# Patient Record
Sex: FEMALE | Race: WHITE | NOT HISPANIC OR LATINO | Employment: OTHER | URBAN - METROPOLITAN AREA
[De-identification: names, ages, dates, MRNs, and addresses within clinical notes are randomized per-mention and may not be internally consistent; named-entity substitution may affect disease eponyms.]

---

## 2017-01-20 ENCOUNTER — RESULTS ENCOUNTER (OUTPATIENT)
Dept: INTERNAL MEDICINE | Facility: CLINIC | Age: 57
End: 2017-01-20

## 2017-01-20 DIAGNOSIS — E78.5 HYPERLIPIDEMIA: ICD-10-CM

## 2017-01-20 DIAGNOSIS — E03.9 ACQUIRED HYPOTHYROIDISM: ICD-10-CM

## 2017-01-25 ENCOUNTER — RESULTS ENCOUNTER (OUTPATIENT)
Dept: INTERNAL MEDICINE | Facility: CLINIC | Age: 57
End: 2017-01-25

## 2017-01-25 DIAGNOSIS — E55.9 VITAMIN D DEFICIENCY: ICD-10-CM

## 2017-02-02 RX ORDER — VENLAFAXINE HYDROCHLORIDE 75 MG/1
CAPSULE, EXTENDED RELEASE ORAL
Qty: 90 CAPSULE | Refills: 1 | Status: SHIPPED | OUTPATIENT
Start: 2017-02-02 | End: 2017-04-24 | Stop reason: SDUPTHER

## 2017-02-28 ENCOUNTER — HOSPITAL ENCOUNTER (EMERGENCY)
Facility: HOSPITAL | Age: 57
Discharge: HOME | End: 2017-02-28
Attending: EMERGENCY MEDICINE | Admitting: EMERGENCY MEDICINE

## 2017-02-28 ENCOUNTER — APPOINTMENT (OUTPATIENT)
Dept: GENERAL RADIOLOGY | Facility: HOSPITAL | Age: 57
End: 2017-02-28

## 2017-02-28 VITALS
HEIGHT: 63 IN | TEMPERATURE: 98.6 F | RESPIRATION RATE: 16 BRPM | WEIGHT: 176 LBS | OXYGEN SATURATION: 99 % | HEART RATE: 79 BPM | BODY MASS INDEX: 31.18 KG/M2 | SYSTOLIC BLOOD PRESSURE: 140 MMHG | DIASTOLIC BLOOD PRESSURE: 95 MMHG

## 2017-02-28 DIAGNOSIS — S93.432A SPRAIN OF TIBIOFIBULAR LIGAMENT OF LEFT ANKLE, INITIAL ENCOUNTER: Primary | ICD-10-CM

## 2017-02-28 PROCEDURE — 73630 X-RAY EXAM OF FOOT: CPT

## 2017-02-28 PROCEDURE — 99282 EMERGENCY DEPT VISIT SF MDM: CPT | Performed by: EMERGENCY MEDICINE

## 2017-02-28 PROCEDURE — 99283 EMERGENCY DEPT VISIT LOW MDM: CPT

## 2017-04-14 ENCOUNTER — TELEPHONE (OUTPATIENT)
Dept: INTERNAL MEDICINE | Facility: CLINIC | Age: 57
End: 2017-04-14

## 2017-04-14 NOTE — TELEPHONE ENCOUNTER
----- Message from ESTHER Edmonds sent at 4/10/2017  8:10 AM EDT -----  Regarding: RE: CHANGE OF MED  Contact: [phone]  This must be related to her deductible. There is no alternative in this drug family. However, there is another medication she can try    Welchol 625mg  Sig: 3 PO BID  Disp #180  ----- Message -----     From: Patricia Carter MA     Sent: 4/10/2017   7:47 AM       To: ESTHER Edmonds  Subject: FW: CHANGE OF MED                                    ----- Message -----     From: Patricia Carter MA     Sent: 2017   1:38 PM       To: Patricia Carter MA  Subject: FW: CHANGE OF MED                                Tried calling pt. To let know that Kiesha is out of town until monday  ----- Message -----     From: Margie Braun     Sent: 2017  12:56 PM       To: Patricia Pinto[address] [place] [address]  Subject: CHANGE OF MED                                    : [birth year]    KIESHA PATIENT    PATIENT CURRENTLY IS TAKING ZETIA, 10 MG, ONCE DAILY.  THE COST OF THE MED IS $700 AND WONDERS IF THERE IS SOMETHING COMPARABLE AND CHEAPER.

## 2017-04-18 ENCOUNTER — TELEPHONE (OUTPATIENT)
Dept: INTERNAL MEDICINE | Facility: CLINIC | Age: 57
End: 2017-04-18

## 2017-04-18 NOTE — TELEPHONE ENCOUNTER
----- Message from ESTHER Edmonds sent at 4/10/2017  8:10 AM EDT -----  Regarding: RE: CHANGE OF MED  Contact: [phone]  This must be related to her deductible. There is no alternative in this drug family. However, there is another medication she can try    Welchol 625mg  Sig: 3 PO BID  Disp #180  ----- Message -----     From: Patricia Carter MA     Sent: 4/10/2017   7:47 AM       To: ESTHER Edmonds  Subject: FW: CHANGE OF MED                                    ----- Message -----     From: Patricia Carter MA     Sent: 2017   1:38 PM       To: Patricia Carter MA  Subject: FW: CHANGE OF MED                                Tried calling pt. To let know that Kiesha is out of town until monday  ----- Message -----     From: Margie Braun     Sent: 2017  12:56 PM       To: Patricia Pinto[address] [place] [address]  Subject: CHANGE OF MED                                    : [birth year]    KIESHA PATIENT    PATIENT CURRENTLY IS TAKING ZETIA, 10 MG, ONCE DAILY.  THE COST OF THE MED IS $700 AND WONDERS IF THERE IS SOMETHING COMPARABLE AND CHEAPER.

## 2017-04-24 RX ORDER — VENLAFAXINE HYDROCHLORIDE 75 MG/1
75 CAPSULE, EXTENDED RELEASE ORAL DAILY
Qty: 90 CAPSULE | Refills: 1 | Status: SHIPPED | OUTPATIENT
Start: 2017-04-24 | End: 2018-03-11 | Stop reason: SDUPTHER

## 2017-04-24 RX ORDER — LEVOTHYROXINE SODIUM 88 UG/1
88 TABLET ORAL DAILY
Qty: 90 TABLET | Refills: 1 | Status: SHIPPED | OUTPATIENT
Start: 2017-04-24 | End: 2017-11-20 | Stop reason: DRUGHIGH

## 2017-04-24 RX ORDER — CELECOXIB 200 MG/1
200 CAPSULE ORAL DAILY
Qty: 90 CAPSULE | Refills: 3 | Status: SHIPPED | OUTPATIENT
Start: 2017-04-24 | End: 2018-04-25 | Stop reason: SDUPTHER

## 2017-05-02 ENCOUNTER — TELEPHONE (OUTPATIENT)
Dept: INTERNAL MEDICINE | Facility: CLINIC | Age: 57
End: 2017-05-02

## 2017-05-04 ENCOUNTER — TELEPHONE (OUTPATIENT)
Dept: INTERNAL MEDICINE | Facility: CLINIC | Age: 57
End: 2017-05-04

## 2017-05-08 LAB
ALBUMIN SERPL-MCNC: 4.3 G/DL (ref 3.5–5.2)
ALBUMIN/GLOB SERPL: 1.4 G/DL
ALP SERPL-CCNC: 133 U/L (ref 40–129)
ALT SERPL-CCNC: 22 U/L (ref 5–33)
AST SERPL-CCNC: 29 U/L (ref 5–32)
BASOPHILS # BLD AUTO: 0.03 10*3/MM3 (ref 0–0.2)
BASOPHILS NFR BLD AUTO: 0.7 % (ref 0–2)
BILIRUB SERPL-MCNC: 0.4 MG/DL (ref 0.2–1.2)
BUN SERPL-MCNC: 16 MG/DL (ref 6–20)
BUN/CREAT SERPL: 25 (ref 7–25)
CALCIUM SERPL-MCNC: 9 MG/DL (ref 8.6–10.5)
CHLORIDE SERPL-SCNC: 101 MMOL/L (ref 98–107)
CHOLEST SERPL-MCNC: 248 MG/DL (ref 0–200)
CHOLEST/HDLC SERPL: 3.94 {RATIO}
CO2 SERPL-SCNC: 28.6 MMOL/L (ref 22–29)
CREAT SERPL-MCNC: 0.64 MG/DL (ref 0.57–1)
EOSINOPHIL # BLD AUTO: 0.08 10*3/MM3 (ref 0.1–0.3)
EOSINOPHIL NFR BLD AUTO: 1.8 % (ref 0–4)
ERYTHROCYTE [DISTWIDTH] IN BLOOD BY AUTOMATED COUNT: 13.6 % (ref 11.5–14.5)
GLOBULIN SER CALC-MCNC: 3 GM/DL
GLUCOSE SERPL-MCNC: 88 MG/DL (ref 65–99)
HCT VFR BLD AUTO: 44 % (ref 37–47)
HDLC SERPL-MCNC: 63 MG/DL (ref 40–60)
HGB BLD-MCNC: 14.1 G/DL (ref 12–16)
IMM GRANULOCYTES # BLD: 0.01 10*3/MM3 (ref 0–0.03)
IMM GRANULOCYTES NFR BLD: 0.2 % (ref 0–0.5)
LDLC SERPL CALC-MCNC: 168 MG/DL (ref 0–100)
LYMPHOCYTES # BLD AUTO: 1.83 10*3/MM3 (ref 0.6–4.8)
LYMPHOCYTES NFR BLD AUTO: 40.7 % (ref 20–45)
MCH RBC QN AUTO: 26.7 PG (ref 27–31)
MCHC RBC AUTO-ENTMCNC: 32 G/DL (ref 31–37)
MCV RBC AUTO: 83.2 FL (ref 81–99)
MONOCYTES # BLD AUTO: 0.45 10*3/MM3 (ref 0–1)
MONOCYTES NFR BLD AUTO: 10 % (ref 3–8)
NEUTROPHILS # BLD AUTO: 2.1 10*3/MM3 (ref 1.5–8.3)
NEUTROPHILS NFR BLD AUTO: 46.6 % (ref 45–70)
NRBC BLD AUTO-RTO: 0 /100 WBC (ref 0–0)
PLATELET # BLD AUTO: 280 10*3/MM3 (ref 140–500)
POTASSIUM SERPL-SCNC: 4.1 MMOL/L (ref 3.5–5.2)
PROT SERPL-MCNC: 7.3 G/DL (ref 6–8.5)
RBC # BLD AUTO: 5.29 10*6/MM3 (ref 4.2–5.4)
SODIUM SERPL-SCNC: 143 MMOL/L (ref 136–145)
T4 SERPL-MCNC: 5.43 MCG/DL (ref 4.5–11.7)
TRIGL SERPL-MCNC: 87 MG/DL (ref 0–150)
TSH SERPL DL<=0.005 MIU/L-ACNC: 0.76 MIU/ML (ref 0.27–4.2)
VLDLC SERPL CALC-MCNC: 17.4 MG/DL (ref 7–27)
WBC # BLD AUTO: 4.5 10*3/MM3 (ref 4.8–10.8)

## 2017-05-15 ENCOUNTER — OFFICE VISIT (OUTPATIENT)
Dept: INTERNAL MEDICINE | Facility: CLINIC | Age: 57
End: 2017-05-15

## 2017-05-15 VITALS
OXYGEN SATURATION: 99 % | SYSTOLIC BLOOD PRESSURE: 132 MMHG | HEART RATE: 76 BPM | DIASTOLIC BLOOD PRESSURE: 86 MMHG | HEIGHT: 63 IN | BODY MASS INDEX: 31.54 KG/M2 | WEIGHT: 178 LBS

## 2017-05-15 DIAGNOSIS — E03.9 ADULT HYPOTHYROIDISM: ICD-10-CM

## 2017-05-15 DIAGNOSIS — L03.211 CELLULITIS, FACE: ICD-10-CM

## 2017-05-15 DIAGNOSIS — E78.5 HYPERLIPIDEMIA, UNSPECIFIED HYPERLIPIDEMIA TYPE: ICD-10-CM

## 2017-05-15 DIAGNOSIS — M25.50 ARTHRALGIA OF MULTIPLE JOINTS: ICD-10-CM

## 2017-05-15 DIAGNOSIS — Z00.00 HEALTHCARE MAINTENANCE: Primary | ICD-10-CM

## 2017-05-15 PROCEDURE — 99396 PREV VISIT EST AGE 40-64: CPT | Performed by: NURSE PRACTITIONER

## 2017-05-15 RX ORDER — SULFAMETHOXAZOLE AND TRIMETHOPRIM 800; 160 MG/1; MG/1
1 TABLET ORAL 2 TIMES DAILY
Qty: 14 TABLET | Refills: 0 | Status: SHIPPED | OUTPATIENT
Start: 2017-05-15 | End: 2017-11-15

## 2017-05-15 RX ORDER — TRAMADOL HYDROCHLORIDE 100 MG/1
100 TABLET, EXTENDED RELEASE ORAL NIGHTLY
Qty: 90 TABLET | Refills: 0 | Status: SHIPPED | OUTPATIENT
Start: 2017-05-15 | End: 2017-05-15 | Stop reason: SDUPTHER

## 2017-05-15 RX ORDER — MULTIVIT-MIN/IRON/FOLIC ACID/K 18-600-40
2000 CAPSULE ORAL DAILY
Qty: 30 CAPSULE | Refills: 11 | Status: SHIPPED | OUTPATIENT
Start: 2017-05-15

## 2017-05-15 RX ORDER — TRAMADOL HYDROCHLORIDE 100 MG/1
100 TABLET, EXTENDED RELEASE ORAL NIGHTLY
Qty: 90 TABLET | Refills: 0 | Status: SHIPPED | OUTPATIENT
Start: 2017-05-15 | End: 2017-10-03 | Stop reason: SDUPTHER

## 2017-05-20 ENCOUNTER — RESULTS ENCOUNTER (OUTPATIENT)
Dept: INTERNAL MEDICINE | Facility: CLINIC | Age: 57
End: 2017-05-20

## 2017-05-20 DIAGNOSIS — E78.5 HYPERLIPIDEMIA, UNSPECIFIED HYPERLIPIDEMIA TYPE: ICD-10-CM

## 2017-06-01 ENCOUNTER — TRANSCRIBE ORDERS (OUTPATIENT)
Dept: ADMINISTRATIVE | Facility: HOSPITAL | Age: 57
End: 2017-06-01

## 2017-06-01 DIAGNOSIS — Z13.9 SCREENING: Primary | ICD-10-CM

## 2017-10-03 DIAGNOSIS — M25.50 ARTHRALGIA OF MULTIPLE JOINTS: ICD-10-CM

## 2017-10-03 RX ORDER — TRAMADOL HYDROCHLORIDE 100 MG/1
100 TABLET, EXTENDED RELEASE ORAL NIGHTLY
Qty: 30 TABLET | Refills: 0 | OUTPATIENT
Start: 2017-10-03 | End: 2017-11-20 | Stop reason: SDUPTHER

## 2017-11-15 ENCOUNTER — OFFICE VISIT (OUTPATIENT)
Dept: INTERNAL MEDICINE | Facility: CLINIC | Age: 57
End: 2017-11-15

## 2017-11-15 VITALS
HEART RATE: 80 BPM | HEIGHT: 64 IN | TEMPERATURE: 98.1 F | WEIGHT: 188 LBS | RESPIRATION RATE: 16 BRPM | SYSTOLIC BLOOD PRESSURE: 130 MMHG | OXYGEN SATURATION: 96 % | DIASTOLIC BLOOD PRESSURE: 90 MMHG | BODY MASS INDEX: 32.1 KG/M2

## 2017-11-15 DIAGNOSIS — E03.9 ADULT HYPOTHYROIDISM: Primary | ICD-10-CM

## 2017-11-15 DIAGNOSIS — E78.5 HYPERLIPIDEMIA, UNSPECIFIED HYPERLIPIDEMIA TYPE: ICD-10-CM

## 2017-11-15 DIAGNOSIS — Z11.59 NEED FOR HEPATITIS C SCREENING TEST: ICD-10-CM

## 2017-11-15 DIAGNOSIS — E55.9 VITAMIN D DEFICIENCY: ICD-10-CM

## 2017-11-15 PROCEDURE — 99214 OFFICE O/P EST MOD 30 MIN: CPT | Performed by: NURSE PRACTITIONER

## 2017-11-15 RX ORDER — COLESEVELAM 180 1/1
1875 TABLET ORAL 2 TIMES DAILY WITH MEALS
Qty: 180 TABLET | Refills: 3 | Status: SHIPPED | OUTPATIENT
Start: 2017-11-15 | End: 2017-11-27 | Stop reason: SDUPTHER

## 2017-11-15 NOTE — PROGRESS NOTES
Chief Complaint   Patient presents with   • Hypothyroidism     fasting for all labs   • Depression       Subjective     Cecily Broussard is a 57 y.o. female being seen for a follow up appointment today regarding hyperlipidemia, Vitamin D Def, Depression and hypothyroidism. She did not have her fasting labs complete. She is not taking Zetia due to cost, and she cannot take statins due to joint pains. Denies  s/s of hypoglycemia.     She is taking Ultram as needed for joint pains as needed. She had a positive CCP test. She takes only once weekly. She takes Celebrex daily. Daily joint pains rated 5 of 10. She is exercising regularly.       History of Present Illness     Allergies   Allergen Reactions   • Estrogens Other (See Comments)   • Statins Other (See Comments)     Joint pains         Current Outpatient Prescriptions:   •  celecoxib (CeleBREX) 200 MG capsule, Take 1 capsule by mouth Daily., Disp: 90 capsule, Rfl: 3  •  Cholecalciferol (VITAMIN D) 2000 UNITS capsule, Take 1 capsule by mouth Daily., Disp: 30 capsule, Rfl: 11  •  Cholecalciferol (VITAMIN D3) 1000 UNIT/SPRAY liquid, Take  by mouth., Disp: , Rfl:   •  levothyroxine (SYNTHROID, LEVOTHROID) 88 MCG tablet, Take 1 tablet by mouth Daily., Disp: 90 tablet, Rfl: 1  •  traMADol ER (ULTRAM-ER) 100 MG 24 hr tablet, Take 1 tablet by mouth Every Night., Disp: 30 tablet, Rfl: 0  •  venlafaxine XR (EFFEXOR-XR) 75 MG 24 hr capsule, Take 1 capsule by mouth Daily., Disp: 90 capsule, Rfl: 1  •  cycloSPORINE (RESTASIS) 0.05 % ophthalmic emulsion, Apply  to eye., Disp: , Rfl:   •  sulfamethoxazole-trimethoprim (BACTRIM DS) 800-160 MG per tablet, Take 1 tablet by mouth 2 (Two) Times a Day., Disp: 14 tablet, Rfl: 0    The following portions of the patient's history were reviewed and updated as appropriate: allergies, current medications, past family history, past medical history, past social history, past surgical history and problem list.    Review of Systems    Constitutional: Negative.    HENT: Negative.    Eyes: Negative.    Respiratory: Negative.    Cardiovascular: Negative.    Gastrointestinal: Negative.    Endocrine: Negative.    Genitourinary: Negative.    Musculoskeletal: Positive for arthralgias. Negative for joint swelling and myalgias.   Skin: Negative.    Allergic/Immunologic: Negative.    Neurological: Negative.  Negative for dizziness and facial asymmetry.   Hematological: Negative.  Negative for adenopathy. Does not bruise/bleed easily.   Psychiatric/Behavioral: Negative.  Negative for agitation, behavioral problems and decreased concentration.       Assessment     Physical Exam   Constitutional: She is oriented to person, place, and time. She appears well-developed and well-nourished.   HENT:   Head: Normocephalic.   Right Ear: External ear normal.   Left Ear: External ear normal.   Nose: Nose normal.   Mouth/Throat: Oropharynx is clear and moist.   Neck: Neck supple. No thyromegaly present.   Cardiovascular: Normal rate, regular rhythm and normal heart sounds.    No murmur heard.  Pulmonary/Chest: Effort normal and breath sounds normal. No respiratory distress. She has no wheezes.   Musculoskeletal: She exhibits no edema.   Bilateral hands with with swelling of DIP and PIP joints. No swan neck deformity.    Lymphadenopathy:     She has no cervical adenopathy.   Neurological: She is alert and oriented to person, place, and time. No cranial nerve deficit.   Skin: Skin is warm and dry.   Psychiatric: She has a normal mood and affect. Her behavior is normal.   Vitals reviewed.      Plan     Her fasting labs were drawn today.     Cecily was seen today for hypothyroidism and depression.    Diagnoses and all orders for this visit:    Adult hypothyroidism  -     T4 & TSH ([facility])  -     CBC & Differential    Hyperlipidemia, unspecified hyperlipidemia type  -     Comprehensive metabolic panel  -     Conv Lipid Panel w/ Chol/HDL Ratio  -     colesevelam (WELCHOL)  625 MG tablet; Take 3 tablets by mouth 2 (Two) Times a Day With Meals.    Vitamin D deficiency  -     Vitamin D 1,25 Dihydroxy    Need for hepatitis C screening test  -     Hepatitis C Antibody        The patient has read and signed the [facility] Controlled Substance Contract.  I will continue to see patient for regular follow up appointments.  They are well controlled on their medication.  MANDY is updated every 3 months. The patient is aware of the potential for addiction and dependence.

## 2017-11-17 LAB
1,25(OH)2D3 SERPL-MCNC: 55.4 PG/ML (ref 19.9–79.3)
ALBUMIN SERPL-MCNC: 4.5 G/DL (ref 3.5–5.2)
ALBUMIN/GLOB SERPL: 1.7 G/DL
ALP SERPL-CCNC: 119 U/L (ref 40–129)
ALT SERPL-CCNC: 15 U/L (ref 5–33)
AST SERPL-CCNC: 20 U/L (ref 5–32)
BASOPHILS # BLD AUTO: 0.02 10*3/MM3 (ref 0–0.2)
BASOPHILS NFR BLD AUTO: 0.5 % (ref 0–2)
BILIRUB SERPL-MCNC: 0.4 MG/DL (ref 0.2–1.2)
BUN SERPL-MCNC: 16 MG/DL (ref 6–20)
BUN/CREAT SERPL: 20.3 (ref 7–25)
CALCIUM SERPL-MCNC: 9.1 MG/DL (ref 8.6–10.5)
CHLORIDE SERPL-SCNC: 102 MMOL/L (ref 98–107)
CHOLEST SERPL-MCNC: 240 MG/DL (ref 0–200)
CHOLEST/HDLC SERPL: 3.81 {RATIO}
CO2 SERPL-SCNC: 31.7 MMOL/L (ref 22–29)
CREAT SERPL-MCNC: 0.79 MG/DL (ref 0.57–1)
EOSINOPHIL # BLD AUTO: 0.05 10*3/MM3 (ref 0.1–0.3)
EOSINOPHIL NFR BLD AUTO: 1.2 % (ref 0–4)
ERYTHROCYTE [DISTWIDTH] IN BLOOD BY AUTOMATED COUNT: 13.3 % (ref 11.5–14.5)
GFR SERPLBLD CREATININE-BSD FMLA CKD-EPI: 75 ML/MIN/1.73
GFR SERPLBLD CREATININE-BSD FMLA CKD-EPI: 91 ML/MIN/1.73
GLOBULIN SER CALC-MCNC: 2.6 GM/DL
GLUCOSE SERPL-MCNC: 87 MG/DL (ref 65–99)
HCT VFR BLD AUTO: 43.7 % (ref 37–47)
HCV AB S/CO SERPL IA: <0.1 S/CO RATIO (ref 0–0.9)
HDLC SERPL-MCNC: 63 MG/DL (ref 40–60)
HGB BLD-MCNC: 14.2 G/DL (ref 12–16)
IMM GRANULOCYTES # BLD: 0.01 10*3/MM3 (ref 0–0.03)
IMM GRANULOCYTES NFR BLD: 0.2 % (ref 0–0.5)
LDLC SERPL CALC-MCNC: 156 MG/DL (ref 0–100)
LYMPHOCYTES # BLD AUTO: 1.33 10*3/MM3 (ref 0.6–4.8)
LYMPHOCYTES NFR BLD AUTO: 31.3 % (ref 20–45)
MCH RBC QN AUTO: 27.8 PG (ref 27–31)
MCHC RBC AUTO-ENTMCNC: 32.5 G/DL (ref 31–37)
MCV RBC AUTO: 85.7 FL (ref 81–99)
MONOCYTES # BLD AUTO: 0.55 10*3/MM3 (ref 0–1)
MONOCYTES NFR BLD AUTO: 12.9 % (ref 3–8)
NEUTROPHILS # BLD AUTO: 2.29 10*3/MM3 (ref 1.5–8.3)
NEUTROPHILS NFR BLD AUTO: 53.9 % (ref 45–70)
NRBC BLD AUTO-RTO: 0 /100 WBC (ref 0–0)
PLATELET # BLD AUTO: 255 10*3/MM3 (ref 140–500)
POTASSIUM SERPL-SCNC: 4.4 MMOL/L (ref 3.5–5.2)
PROT SERPL-MCNC: 7.1 G/DL (ref 6–8.5)
RBC # BLD AUTO: 5.1 10*6/MM3 (ref 4.2–5.4)
SODIUM SERPL-SCNC: 141 MMOL/L (ref 136–145)
T4 SERPL-MCNC: 5.18 MCG/DL (ref 4.5–11.7)
TRIGL SERPL-MCNC: 105 MG/DL (ref 0–150)
TSH SERPL DL<=0.005 MIU/L-ACNC: 7.51 MIU/ML (ref 0.27–4.2)
VLDLC SERPL CALC-MCNC: 21 MG/DL (ref 7–27)
WBC # BLD AUTO: 4.25 10*3/MM3 (ref 4.8–10.8)

## 2017-11-20 ENCOUNTER — TELEPHONE (OUTPATIENT)
Dept: INTERNAL MEDICINE | Facility: CLINIC | Age: 57
End: 2017-11-20

## 2017-11-20 DIAGNOSIS — M25.50 ARTHRALGIA OF MULTIPLE JOINTS: ICD-10-CM

## 2017-11-20 RX ORDER — TRAMADOL HYDROCHLORIDE 100 MG/1
100 TABLET, EXTENDED RELEASE ORAL NIGHTLY
Qty: 30 TABLET | Refills: 2 | OUTPATIENT
Start: 2017-11-20 | End: 2018-06-20 | Stop reason: SDUPTHER

## 2017-11-20 RX ORDER — LEVOTHYROXINE SODIUM 0.1 MG/1
100 TABLET ORAL DAILY
Qty: 30 TABLET | Refills: 1 | Status: SHIPPED | OUTPATIENT
Start: 2017-11-20 | End: 2018-02-01 | Stop reason: SDUPTHER

## 2017-11-20 NOTE — TELEPHONE ENCOUNTER
Patient has been advised and voiced understanding. New RX Synthroid 100 mcg sent to local pharmacy. Patient advised to increase RX Welchol to 2 tablets twice daily.     ----- Message from ESTHER Edmonds sent at 11/20/2017 11:52 AM EST -----  I want her to increase her Synthroid to 100 mcgs daily as her thyroid level is a bit low. Repeat TSH and free t4 in 4 weeks. Her cholesterol has improved slightly from last check. Try to increase Welchol to 2 tablets twice daily. Blood glucose is normal.

## 2017-11-27 ENCOUNTER — TELEPHONE (OUTPATIENT)
Dept: INTERNAL MEDICINE | Facility: CLINIC | Age: 57
End: 2017-11-27

## 2017-11-27 RX ORDER — CHOLESTYRAMINE 4 G/9G
POWDER, FOR SUSPENSION ORAL
Qty: 90 PACKET | Refills: 3 | Status: SHIPPED | OUTPATIENT
Start: 2017-11-27 | End: 2019-01-07

## 2017-11-27 NOTE — TELEPHONE ENCOUNTER
----- Message from ESTHER Edmonds sent at 2017 12:47 PM EST -----  Regarding: FW: REPLACEMENT MED  cholestyramine 4 g pkt  Si Pkt daily  Disp #90, 3 refills  ----- Message -----     From: Kae Parker MA     Sent: 2017  10:54 AM       To: ESTHER Edmonds  Subject: FW: REPLACEMENT MED                                  ----- Message -----     From: Radha Sen     Sent: 2017  10:48 AM       To: Kae Parker MA  Subject: REPLACEMENT MED                                  kendra    Pharmacy - [facility] in [place]    colesevelam (WELCHOL) 625 MG tablet #patient can't afford   it is $695 for 30 day supply    Patient says she would be happy to look up her cost of medication (before sending in script) if we let her know what Lyubov may prescribe.    DRUG ALLERGIES - NONE    [phone]    Left message for pt.  Sent to pharmacy

## 2018-02-01 RX ORDER — LEVOTHYROXINE SODIUM 0.1 MG/1
TABLET ORAL
Qty: 30 TABLET | Refills: 3 | Status: SHIPPED | OUTPATIENT
Start: 2018-02-01 | End: 2018-08-14 | Stop reason: SDUPTHER

## 2018-03-12 RX ORDER — VENLAFAXINE HYDROCHLORIDE 75 MG/1
CAPSULE, EXTENDED RELEASE ORAL
Qty: 30 CAPSULE | Refills: 5 | Status: SHIPPED | OUTPATIENT
Start: 2018-03-12 | End: 2018-11-01 | Stop reason: SDUPTHER

## 2018-04-25 RX ORDER — CELECOXIB 200 MG/1
CAPSULE ORAL
Qty: 30 CAPSULE | Refills: 2 | Status: SHIPPED | OUTPATIENT
Start: 2018-04-25 | End: 2018-08-14 | Stop reason: SDUPTHER

## 2018-05-09 ENCOUNTER — LAB (OUTPATIENT)
Dept: INTERNAL MEDICINE | Facility: CLINIC | Age: 58
End: 2018-05-09

## 2018-05-09 DIAGNOSIS — E03.9 ADULT HYPOTHYROIDISM: ICD-10-CM

## 2018-05-09 DIAGNOSIS — E55.9 VITAMIN D DEFICIENCY: ICD-10-CM

## 2018-05-09 DIAGNOSIS — Z00.00 HEALTHCARE MAINTENANCE: ICD-10-CM

## 2018-05-09 DIAGNOSIS — E78.5 HYPERLIPIDEMIA, UNSPECIFIED HYPERLIPIDEMIA TYPE: ICD-10-CM

## 2018-05-09 DIAGNOSIS — E78.5 HYPERLIPIDEMIA, UNSPECIFIED HYPERLIPIDEMIA TYPE: Primary | ICD-10-CM

## 2018-05-09 LAB
25(OH)D3+25(OH)D2 SERPL-MCNC: 42.1 NG/ML
ALBUMIN SERPL-MCNC: 4.1 G/DL (ref 3.5–5.2)
ALBUMIN/GLOB SERPL: 1.6 G/DL
ALP SERPL-CCNC: 118 U/L (ref 40–129)
ALT SERPL-CCNC: 18 U/L (ref 5–33)
AST SERPL-CCNC: 22 U/L (ref 5–32)
BASOPHILS # BLD AUTO: 0.03 10*3/MM3 (ref 0–0.2)
BASOPHILS NFR BLD AUTO: 0.7 % (ref 0–2)
BILIRUB SERPL-MCNC: 0.5 MG/DL (ref 0.2–1.2)
BUN SERPL-MCNC: 17 MG/DL (ref 6–20)
BUN/CREAT SERPL: 27 (ref 7–25)
CALCIUM SERPL-MCNC: 9.3 MG/DL (ref 8.6–10.5)
CHLORIDE SERPL-SCNC: 103 MMOL/L (ref 98–107)
CHOLEST SERPL-MCNC: 205 MG/DL (ref 0–200)
CHOLEST/HDLC SERPL: 3.31 {RATIO}
CO2 SERPL-SCNC: 28.6 MMOL/L (ref 22–29)
CREAT SERPL-MCNC: 0.63 MG/DL (ref 0.57–1)
EOSINOPHIL # BLD AUTO: 0.07 10*3/MM3 (ref 0.1–0.3)
EOSINOPHIL NFR BLD AUTO: 1.6 % (ref 0–4)
ERYTHROCYTE [DISTWIDTH] IN BLOOD BY AUTOMATED COUNT: 13.1 % (ref 11.5–14.5)
GFR SERPLBLD CREATININE-BSD FMLA CKD-EPI: 117 ML/MIN/1.73
GFR SERPLBLD CREATININE-BSD FMLA CKD-EPI: 97 ML/MIN/1.73
GLOBULIN SER CALC-MCNC: 2.5 GM/DL
GLUCOSE SERPL-MCNC: 88 MG/DL (ref 65–99)
HCT VFR BLD AUTO: 43.5 % (ref 37–47)
HDLC SERPL-MCNC: 62 MG/DL (ref 40–60)
HGB BLD-MCNC: 13.9 G/DL (ref 12–16)
IMM GRANULOCYTES # BLD: 0.01 10*3/MM3 (ref 0–0.03)
IMM GRANULOCYTES NFR BLD: 0.2 % (ref 0–0.5)
LDLC SERPL CALC-MCNC: 124 MG/DL (ref 0–100)
LYMPHOCYTES # BLD AUTO: 1.42 10*3/MM3 (ref 0.6–4.8)
LYMPHOCYTES NFR BLD AUTO: 33.3 % (ref 20–45)
MCH RBC QN AUTO: 27 PG (ref 27–31)
MCHC RBC AUTO-ENTMCNC: 32 G/DL (ref 31–37)
MCV RBC AUTO: 84.5 FL (ref 81–99)
MONOCYTES # BLD AUTO: 0.55 10*3/MM3 (ref 0–1)
MONOCYTES NFR BLD AUTO: 12.9 % (ref 3–8)
NEUTROPHILS # BLD AUTO: 2.19 10*3/MM3 (ref 1.5–8.3)
NEUTROPHILS NFR BLD AUTO: 51.3 % (ref 45–70)
NRBC BLD AUTO-RTO: 0 /100 WBC (ref 0–0)
PLATELET # BLD AUTO: 231 10*3/MM3 (ref 140–500)
POTASSIUM SERPL-SCNC: 4.1 MMOL/L (ref 3.5–5.2)
PROT SERPL-MCNC: 6.6 G/DL (ref 6–8.5)
RBC # BLD AUTO: 5.15 10*6/MM3 (ref 4.2–5.4)
SODIUM SERPL-SCNC: 141 MMOL/L (ref 136–145)
T4 FREE SERPL-MCNC: 1.19 NG/DL (ref 0.93–1.7)
TRIGL SERPL-MCNC: 97 MG/DL (ref 0–150)
TSH SERPL DL<=0.005 MIU/L-ACNC: 0.1 MIU/ML (ref 0.27–4.2)
VLDLC SERPL CALC-MCNC: 19.4 MG/DL (ref 7–27)
WBC # BLD AUTO: 4.27 10*3/MM3 (ref 4.8–10.8)

## 2018-05-25 ENCOUNTER — OFFICE VISIT (OUTPATIENT)
Dept: INTERNAL MEDICINE | Facility: CLINIC | Age: 58
End: 2018-05-25

## 2018-05-25 VITALS
RESPIRATION RATE: 16 BRPM | OXYGEN SATURATION: 97 % | HEIGHT: 64 IN | DIASTOLIC BLOOD PRESSURE: 88 MMHG | WEIGHT: 183 LBS | TEMPERATURE: 97.5 F | BODY MASS INDEX: 31.24 KG/M2 | HEART RATE: 94 BPM | SYSTOLIC BLOOD PRESSURE: 138 MMHG

## 2018-05-25 DIAGNOSIS — K63.5 POLYP OF COLON, UNSPECIFIED PART OF COLON, UNSPECIFIED TYPE: ICD-10-CM

## 2018-05-25 DIAGNOSIS — Z00.00 HEALTHCARE MAINTENANCE: Primary | ICD-10-CM

## 2018-05-25 DIAGNOSIS — E55.9 VITAMIN D DEFICIENCY: ICD-10-CM

## 2018-05-25 DIAGNOSIS — Z12.83 SCREENING FOR SKIN CANCER: ICD-10-CM

## 2018-05-25 DIAGNOSIS — E78.2 MIXED HYPERLIPIDEMIA: ICD-10-CM

## 2018-05-25 DIAGNOSIS — E03.9 ADULT HYPOTHYROIDISM: ICD-10-CM

## 2018-05-25 DIAGNOSIS — F32.0 MILD SINGLE CURRENT EPISODE OF MAJOR DEPRESSIVE DISORDER (HCC): ICD-10-CM

## 2018-05-25 DIAGNOSIS — R87.612 LOW GRADE SQUAMOUS INTRAEPITH LESION ON CYTOLOGIC SMEAR CERVIX (LGSIL): ICD-10-CM

## 2018-05-25 LAB
BILIRUB BLD-MCNC: NEGATIVE MG/DL
CLARITY, POC: CLEAR
COLOR UR: YELLOW
GLUCOSE UR STRIP-MCNC: NEGATIVE MG/DL
KETONES UR QL: NEGATIVE
LEUKOCYTE EST, POC: ABNORMAL
NITRITE UR-MCNC: NEGATIVE MG/ML
PH UR: 6 [PH] (ref 5–8)
POC CREATININE URINE: NORMAL
POC MICROALBUMIN URINE: NORMAL
PROT UR STRIP-MCNC: NEGATIVE MG/DL
RBC # UR STRIP: NEGATIVE /UL
SP GR UR: 1.02 (ref 1–1.03)
UROBILINOGEN UR QL: NORMAL

## 2018-05-25 PROCEDURE — 82044 UR ALBUMIN SEMIQUANTITATIVE: CPT | Performed by: NURSE PRACTITIONER

## 2018-05-25 PROCEDURE — 99213 OFFICE O/P EST LOW 20 MIN: CPT | Performed by: NURSE PRACTITIONER

## 2018-05-25 PROCEDURE — 81003 URINALYSIS AUTO W/O SCOPE: CPT | Performed by: NURSE PRACTITIONER

## 2018-05-25 PROCEDURE — 99396 PREV VISIT EST AGE 40-64: CPT | Performed by: NURSE PRACTITIONER

## 2018-05-25 PROCEDURE — 93000 ELECTROCARDIOGRAM COMPLETE: CPT | Performed by: NURSE PRACTITIONER

## 2018-05-25 NOTE — PROGRESS NOTES
Procedure     ECG 12 Lead  Date/Time: 5/25/2018 1:13 PM  Performed by: KIESHA HOLLEY  Authorized by: KIESHA HOLLEY   Comparison: compared with previous ECG from 1/1/2016  Similar to previous ECG  Rhythm: sinus rhythm  Rate: normal  BPM: 87  ST Segments: ST segments normal  T Waves: T waves normal  QRS axis: left  Other: no other findings  Clinical impression: non-specific ECG

## 2018-05-25 NOTE — PROGRESS NOTES
Annual Exam        Cecily Broussard is being seen for a Complete physical exam. Her last physical was unknown.     Social: She is  . She is working full as a .    Lifestyle: She does not use tobacco, has a 2 Pack year history. She drinks twice alcoholic drinks per month. She exercises 2 times a week, yard work.    Screening: Colonoscopy was completed 11-, due now due to high grade dysplasia. Last labs reviewed from 5-9-2018.      Reproductive Health: Her Last Pap smear was last year with Dr. Meade.  Last Mammogram was 3-, order placed but she did not complete.    Dental exam is  up to date. Eye exam was completed not up to date.     She is also following up on Hypothyroidism and hyperlipidemia. She is complaint with levoxyl and Questran. She is having some constipation from Questran.     History of Present Illness     The following portions of the patient's history were reviewed and updated as appropriate: allergies, current medications, past family history, past medical history, past social history, past surgical history and problem list.    Review of Systems   Constitutional: Negative.    HENT: Negative.    Eyes: Negative.    Respiratory: Negative.    Cardiovascular: Negative.    Gastrointestinal: Negative.    Endocrine: Negative.    Genitourinary: Negative.    Musculoskeletal: Positive for arthralgias.   Allergic/Immunologic: Positive for environmental allergies.   Neurological: Negative.    Hematological: Negative.    Psychiatric/Behavioral: Negative.        Objective       General Appearance:    Alert, cooperative, no distress, appears stated age   Head:    Normocephalic, without obvious abnormality, atraumatic   Eyes:    PERRL, conjunctiva/corneas clear, EOM's intact, fundi     benign, both eyes   Ears:    Normal TM's and external ear canals, both ears   Nose:   Nares normal, septum midline, mucosa normal, no drainage     or sinus tenderness   Throat:   Lips, mucosa, and tongue  normal; teeth and gums normal   Neck:   Supple, symmetrical, trachea midline, no adenopathy;     thyroid:  no enlargement/tenderness/nodules; no carotid    bruit or JVD   Back:     Symmetric, no curvature, ROM normal, no CVA tenderness   Lungs:     Clear to auscultation bilaterally, respirations unlabored   Chest Wall:    No tenderness or deformity    Heart:    Regular rate and rhythm, S1 and S2 normal, no murmur, rub    or gallop   Breast Exam:    deferred   Abdomen:     Soft, non-tender, bowel sounds active all four quadrants,     no masses, no organomegaly   Genitalia:   deferred   Rectal:   deferred   Extremities:   Extremities normal, atraumatic, no cyanosis or edema   Pulses:   2+ and symmetric all extremities   Skin:   Skin color, texture, turgor normal, no rashes or lesions   Lymph nodes:   Cervical, supraclavicular, and axillary nodes normal   Neurologic:   CNII-XII intact, normal strength, sensation and reflexes     throughout               Assessment/Plan   Cecily was seen today for annual exam.    Diagnoses and all orders for this visit:    Healthcare maintenance  -     ECG 12 Lead    Mixed hyperlipidemia  -     ECG 12 Lead  -     Comprehensive metabolic panel; Future  -     Conv Lipid Panel w/ Chol/HDL Ratio; Future    Adult hypothyroidism  -     T4 & TSH ([facility]); Future    Polyp of colon, unspecified part of colon, unspecified type  -     Ambulatory Referral to Gastroenterology    Low grade squamous intraepith lesion on cytologic smear cervix (lgsil)    Vitamin D deficiency    Mild single current episode of major depressive disorder    Screening for skin cancer  -     Ambulatory Referral to Dermatology    She will continue current medications, including Questran and Levoxyl.     The patient has read and signed the [facility] Controlled Substance Contract.  I will continue to see patient for regular follow up appointments.  They are well controlled on their medication.  MANDY is updated every 3  months. The patient is aware of the potential for addiction and dependence.    She will follow up at next scheduled appointment in 6 months.

## 2018-06-20 ENCOUNTER — TRANSCRIBE ORDERS (OUTPATIENT)
Dept: ADMINISTRATIVE | Facility: HOSPITAL | Age: 58
End: 2018-06-20

## 2018-06-20 DIAGNOSIS — M25.50 ARTHRALGIA OF MULTIPLE JOINTS: ICD-10-CM

## 2018-06-20 DIAGNOSIS — Z12.39 SCREENING BREAST EXAMINATION: Primary | ICD-10-CM

## 2018-06-21 RX ORDER — TRAMADOL HYDROCHLORIDE 100 MG/1
100 TABLET, EXTENDED RELEASE ORAL NIGHTLY
Qty: 30 TABLET | Refills: 2 | Status: SHIPPED | OUTPATIENT
Start: 2018-06-21 | End: 2018-11-21 | Stop reason: SDUPTHER

## 2018-06-28 ENCOUNTER — APPOINTMENT (OUTPATIENT)
Dept: MAMMOGRAPHY | Facility: HOSPITAL | Age: 58
End: 2018-06-28

## 2018-07-06 ENCOUNTER — HOSPITAL ENCOUNTER (OUTPATIENT)
Dept: MAMMOGRAPHY | Facility: HOSPITAL | Age: 58
Discharge: HOME | End: 2018-07-06
Admitting: NURSE PRACTITIONER

## 2018-07-06 DIAGNOSIS — Z12.39 SCREENING BREAST EXAMINATION: ICD-10-CM

## 2018-07-06 PROCEDURE — 77067 SCR MAMMO BI INCL CAD: CPT

## 2018-07-17 ENCOUNTER — TELEPHONE (OUTPATIENT)
Dept: SURGERY | Facility: CLINIC | Age: 58
End: 2018-07-17

## 2018-07-17 ENCOUNTER — PREP FOR SURGERY (OUTPATIENT)
Dept: OTHER | Facility: HOSPITAL | Age: 58
End: 2018-07-17

## 2018-07-17 DIAGNOSIS — Z86.010 HISTORY OF COLON POLYPS: Primary | ICD-10-CM

## 2018-07-18 ENCOUNTER — TELEPHONE (OUTPATIENT)
Dept: GASTROENTEROLOGY | Facility: CLINIC | Age: 58
End: 2018-07-18

## 2018-07-18 ENCOUNTER — HOSPITAL ENCOUNTER (OUTPATIENT)
Facility: HOSPITAL | Age: 58
Setting detail: HOSPITAL OUTPATIENT SURGERY
End: 2018-07-18
Attending: INTERNAL MEDICINE | Admitting: INTERNAL MEDICINE

## 2018-07-18 PROBLEM — Z86.010 HISTORY OF COLON POLYPS: Status: ACTIVE | Noted: 2018-07-18

## 2018-07-18 PROBLEM — Z86.0100 HISTORY OF COLON POLYPS: Status: ACTIVE | Noted: 2018-07-18

## 2018-07-18 NOTE — TELEPHONE ENCOUNTER
SHE NOT HAVING ISSUES WITH SWALLOWING, WHICH WAS MARKED IN ERROR.  SHE DOES WANT THE OSMO PILLS.  PLEASE SENT TO [facility] IN [place].  THANKS!!

## 2018-07-18 NOTE — TELEPHONE ENCOUNTER
----- Message from Patrice Noel MD sent at 7/17/2018  9:34 PM EDT -----  Colon/BHLAG/Miralax/polyps-    She said trouble swallowing so I didn't send osmo...dont know if she needs an egd too??  ----- Message -----  From: Fransisca Jerrod  Sent: 7/17/2018   1:18 PM  To: Patrice Noel MD

## 2018-07-18 NOTE — TELEPHONE ENCOUNTER
SCHEDULED AT [facility] 09/25/2018 AT 8AM - ARRIVE 7AM.  SHE NOT HAVING TROUBLE SWALLOWING, DOES WANT THE OSMO PILLS.  WILL LET DR. CASPER KNOW.  EMAIL INSTRUCTIONS TO HER:  [email or web address].

## 2018-08-14 RX ORDER — CELECOXIB 200 MG/1
CAPSULE ORAL
Qty: 90 CAPSULE | Refills: 1 | Status: SHIPPED | OUTPATIENT
Start: 2018-08-14 | End: 2019-04-22 | Stop reason: SDUPTHER

## 2018-08-14 RX ORDER — LEVOTHYROXINE SODIUM 0.1 MG/1
TABLET ORAL
Qty: 30 TABLET | Refills: 2 | Status: SHIPPED | OUTPATIENT
Start: 2018-08-14 | End: 2018-12-31 | Stop reason: SDUPTHER

## 2018-09-18 ENCOUNTER — TELEPHONE (OUTPATIENT)
Dept: GASTROENTEROLOGY | Facility: CLINIC | Age: 58
End: 2018-09-18

## 2018-09-18 NOTE — TELEPHONE ENCOUNTER
SCHEDULED [facility] 09/25/2018, NEEDS TO RESCHEDULED, DAUGHTER IS HAVING BABY.  MOVED TO 11/06/2018 AT 8AM - ARRIVE 7AM.  SHE DOES WANT NEW INSTRUCTIONS, WILL EMAIL TO HER.  VERIFY EMAIL, WHICH IS CORRECT.  NOTIFY SARITA @ [facility].

## 2018-10-22 ENCOUNTER — OFFICE VISIT (OUTPATIENT)
Dept: OBSTETRICS AND GYNECOLOGY | Facility: CLINIC | Age: 58
End: 2018-10-22

## 2018-10-22 VITALS
HEIGHT: 64 IN | BODY MASS INDEX: 31.65 KG/M2 | DIASTOLIC BLOOD PRESSURE: 79 MMHG | SYSTOLIC BLOOD PRESSURE: 126 MMHG | WEIGHT: 185.4 LBS

## 2018-10-22 DIAGNOSIS — Z01.419 WELL WOMAN EXAM WITH ROUTINE GYNECOLOGICAL EXAM: Primary | ICD-10-CM

## 2018-10-22 DIAGNOSIS — Z11.51 SPECIAL SCREENING EXAMINATION FOR HUMAN PAPILLOMAVIRUS (HPV): ICD-10-CM

## 2018-10-22 DIAGNOSIS — Z13.9 SCREENING FOR CONDITION: ICD-10-CM

## 2018-10-22 DIAGNOSIS — Z11.3 SCREEN FOR STD (SEXUALLY TRANSMITTED DISEASE): ICD-10-CM

## 2018-10-22 PROBLEM — Z00.00 HEALTHCARE MAINTENANCE: Status: RESOLVED | Noted: 2017-05-15 | Resolved: 2018-10-22

## 2018-10-22 LAB
BILIRUB BLD-MCNC: NEGATIVE MG/DL
CLARITY, POC: CLEAR
COLOR UR: YELLOW
GLUCOSE UR STRIP-MCNC: NEGATIVE MG/DL
KETONES UR QL: NEGATIVE
LEUKOCYTE EST, POC: NEGATIVE
NITRITE UR-MCNC: NEGATIVE MG/ML
PH UR: 5 [PH] (ref 5–8)
PROT UR STRIP-MCNC: NEGATIVE MG/DL
RBC # UR STRIP: NEGATIVE /UL
SP GR UR: 1 (ref 1–1.03)
UROBILINOGEN UR QL: NORMAL

## 2018-10-22 PROCEDURE — 81002 URINALYSIS NONAUTO W/O SCOPE: CPT | Performed by: OBSTETRICS & GYNECOLOGY

## 2018-10-22 PROCEDURE — 99396 PREV VISIT EST AGE 40-64: CPT | Performed by: OBSTETRICS & GYNECOLOGY

## 2018-10-22 NOTE — PROGRESS NOTES
GYN Annual Exam     CC- Here for annual exam.     Cecily Broussard is a 58 y.o. female established patient of practice who is new to me who presents for annual well woman exam. She denies  VB. She is not on any HRT> She had LGSIL papin 2016 and had colpo bx with REAGAN 1. No other followup seen. She denies  VB. She is not on HRT.       OB History      Para Term  AB Living    2 2 2     2    SAB TAB Ectopic Molar Multiple Live Births                       Obstetric Comments    1   1 C/S          Menarche:13  Menopause:50  HRT:none  Current contraception: post menopausal status and tubal ligation  History of abnormal Pap smear: yes -  limited followup  History of abnormal mammogram: no  Family history of uterine, colon or ovarian cancer: no  Family history of breast cancer: yes - mom age 69, M aunt ? age  STD's: none  Last pap smear:2016- LGSIL      Health Maintenance   Topic Date Due   • ZOSTER VACCINE (1 of 2) 2009   • INFLUENZA VACCINE  2018   • PAP SMEAR  2019   • LIPID PANEL  2019   • ANNUAL PHYSICAL  2019   • MAMMOGRAM  2020   • COLONOSCOPY  2022   • TDAP/TD VACCINES (2 - Td) 2023   • HEPATITIS C SCREENING  Completed       Past Medical History:   Diagnosis Date   • Abnormal Pap smear of cervix    • Arthritis    • Cervical dysplasia 2016    REAGAN 1on bx   • Depression    • Disease of thyroid gland    • Hyperlipidemia    • Menopause        Past Surgical History:   Procedure Laterality Date   • ABDOMINOPLASTY     •  SECTION      x 1   • TUBAL ABDOMINAL LIGATION           Current Outpatient Prescriptions:   •  celecoxib (CeleBREX) 200 MG capsule, TAKE ONE CAPSULE BY MOUTH DAILY, Disp: 90 capsule, Rfl: 1  •  Cholecalciferol (VITAMIN D) 2000 UNITS capsule, Take 1 capsule by mouth Daily., Disp: 30 capsule, Rfl: 11  •  cholestyramine (QUESTRAN) 4 g packet, Take one packet daily, Disp: 90 packet, Rfl: 3  •  cycloSPORINE (RESTASIS) 0.05 % ophthalmic  "emulsion, Apply  to eye., Disp: , Rfl:   •  levothyroxine (SYNTHROID, LEVOTHROID) 100 MCG tablet, TAKE ONE TABLET BY MOUTH DAILY, Disp: 30 tablet, Rfl: 2  •  sodium phosphates (OSMOPREP) 1.102-0.398 g tablet tablet, Starting at 3:00pm -4 tabs every 15 min for 5 doses (20 tabs), second dosing starting at 11:00pm 4 tabs every 15 min. For 2 doses (8 tabs), Disp: 28 tablet, Rfl: 0  •  traMADol ER (ULTRAM-ER) 100 MG 24 hr tablet, Take 1 tablet by mouth Every Night., Disp: 30 tablet, Rfl: 2  •  venlafaxine XR (EFFEXOR-XR) 75 MG 24 hr capsule, TAKE ONE CAPSULE BY MOUTH DAILY, Disp: 30 capsule, Rfl: 5    Allergies   Allergen Reactions   • Estrogens Other (See Comments)   • Statins Other (See Comments)     Joint pains       Social History   Substance Use Topics   • Smoking status: Former Smoker     Years: 2.00     Quit date: 1985   • Smokeless tobacco: Not on file   • Alcohol use Yes      Comment: twice monthly       Family History   Problem Relation Age of Onset   • Breast cancer Mother 69   • Breast cancer Maternal Aunt    • Ovarian cancer Neg Hx    • Colon cancer Neg Hx    • Deep vein thrombosis Neg Hx    • Pulmonary embolism Neg Hx        Review of Systems   Constitutional: Negative for appetite change, fatigue, fever and unexpected weight change.   Respiratory: Negative for cough and shortness of breath.    Cardiovascular: Negative for chest pain and palpitations.   Gastrointestinal: Negative for abdominal distention, abdominal pain, constipation, diarrhea and nausea.   Endocrine: Negative for cold intolerance and heat intolerance.   Genitourinary: Negative for dyspareunia, dysuria, menstrual problem, pelvic pain and vaginal discharge.   Skin: Negative for color change and rash.   Neurological: Negative for headaches.   Psychiatric/Behavioral: Negative for dysphoric mood. The patient is not nervous/anxious.        /79   Ht 162.6 cm (64.02\")   Wt 84.1 kg (185 lb 6.4 oz)   Breastfeeding? No   BMI 31.81 kg/m² "     Physical Exam   Constitutional: She is oriented to person, place, and time. She appears well-developed and well-nourished.   HENT:   Head: Normocephalic and atraumatic.   Eyes: Conjunctivae are normal. No scleral icterus.   Neck: Neck supple. No thyromegaly present.   Cardiovascular: Normal rate and regular rhythm.    Pulmonary/Chest: Effort normal and breath sounds normal. Right breast exhibits no inverted nipple, no mass, no nipple discharge, no skin change and no tenderness. Left breast exhibits no inverted nipple, no mass, no nipple discharge, no skin change and no tenderness.   Abdominal: Soft. Bowel sounds are normal. She exhibits no distension and no mass. There is no tenderness. There is no rebound and no guarding. No hernia.   S/P abdominoplasty   Genitourinary: Uterus normal. Pelvic exam was performed with patient supine. There is no rash, tenderness or lesion on the right labia. There is no rash, tenderness or lesion on the left labia. Cervix exhibits no motion tenderness, no discharge and no friability. Right adnexum displays no mass, no tenderness and no fullness. Left adnexum displays no mass, no tenderness and no fullness. No erythema, tenderness or bleeding in the vagina. No foreign body in the vagina. No signs of injury around the vagina. No vaginal discharge found.   Neurological: She is alert and oriented to person, place, and time.   Skin: Skin is warm and dry.   Psychiatric: She has a normal mood and affect. Her behavior is normal. Judgment and thought content normal.   Nursing note and vitals reviewed.         Assessment/Plan    1) GYN HM: check pap/HPV   SBE demonstrated and encouraged.  2) STD screening: accepts, does not feel she is at risk but wants it done.Condoms encouraged.  3) Bone health - Weight bearing exercise, dietary calcium recommendations and vitamin D reviewed.   4) Diet and Exercise discussed  5) Smoking Status: No   6) Social: no issues  7)MMG:  UTD7/2018, Birads 1  8)  DEXA- needs one, schedule  9)C scope-scheduled for 11/2018   Follow up prn and 1 year       Cecily was seen today for gynecologic exam.    Diagnoses and all orders for this visit:    Well woman exam with routine gynecological exam  -     Cancel: Pap IG, HPV-hr  -     PapIG, CtNgTv, HPV, Rfx 16 / 18    Screening for condition  -     POC Urinalysis Dipstick  -     DEXA Bone Density Axial; Future    Special screening examination for human papillomavirus (HPV)  -     Cancel: Pap IG, HPV-hr  -     PapIG, CtNgTv, HPV, Rfx 16 / 18    Screen for STD (sexually transmitted disease)  -     Hepatitis B Surface Antigen  -     Hepatitis C Antibody  -     HIV-1 / O / 2 Ag / Antibody 4th Generation  -     HSV 1 & 2 - Specific Antibody, IgG  -     RPR, Rfx Qn RPR / Confirm TP        Queta Brandt MD  10/22/2018  3:31 PM

## 2018-10-23 LAB
HBV SURFACE AG SERPL QL IA: NEGATIVE
HCV AB S/CO SERPL IA: <0.1 S/CO RATIO (ref 0–0.9)
HIV 1+2 AB+HIV1 P24 AG SERPL QL IA: NON REACTIVE
HSV1 IGG SER IA-ACNC: 29.1 INDEX (ref 0–0.9)
HSV2 IGG SER IA-ACNC: <0.91 INDEX (ref 0–0.9)
RPR SER QL: NON REACTIVE

## 2018-10-25 LAB
C TRACH RRNA CVX QL NAA+PROBE: NEGATIVE
CYTOLOGIST CVX/VAG CYTO: ABNORMAL
CYTOLOGY CVX/VAG DOC THIN PREP: ABNORMAL
DX ICD CODE: ABNORMAL
DX ICD CODE: ABNORMAL
HIV 1 & 2 AB SER-IMP: ABNORMAL
HPV I/H RISK 1 DNA CVX QL PROBE+SIG AMP: POSITIVE
Lab: ABNORMAL
N GONORRHOEA RRNA CVX QL NAA+PROBE: NEGATIVE
OTHER STN SPEC: ABNORMAL
PATH REPORT.FINAL DX SPEC: ABNORMAL
PATHOLOGIST CVX/VAG CYTO: ABNORMAL
RECOM F/U CVX/VAG CYTO: ABNORMAL
STAT OF ADQ CVX/VAG CYTO-IMP: ABNORMAL
T VAGINALIS RRNA SPEC QL NAA+PROBE: NEGATIVE

## 2018-10-29 ENCOUNTER — APPOINTMENT (OUTPATIENT)
Dept: BONE DENSITY | Facility: HOSPITAL | Age: 58
End: 2018-10-29
Attending: OBSTETRICS & GYNECOLOGY

## 2018-10-29 DIAGNOSIS — Z13.9 SCREENING FOR CONDITION: ICD-10-CM

## 2018-10-29 PROCEDURE — 77080 DXA BONE DENSITY AXIAL: CPT

## 2018-10-30 ENCOUNTER — TELEPHONE (OUTPATIENT)
Dept: GASTROENTEROLOGY | Facility: CLINIC | Age: 58
End: 2018-10-30

## 2018-10-30 NOTE — TELEPHONE ENCOUNTER
SCHEDULED 11/06/2018, NEED TO CANCEL.  INSURANCE WILL NOT COVER ANYTHING.  SHE SWITCHING INSURANCE.  WILL CALL BACK FIRST OF YEAR.  CALLED  [facility], SPOKE WITH BEST, PROCEDURE CANCEL.

## 2018-11-01 RX ORDER — VENLAFAXINE HYDROCHLORIDE 75 MG/1
CAPSULE, EXTENDED RELEASE ORAL
Qty: 90 CAPSULE | Refills: 1 | Status: SHIPPED | OUTPATIENT
Start: 2018-11-01 | End: 2019-07-07 | Stop reason: SDUPTHER

## 2018-11-16 DIAGNOSIS — M25.50 ARTHRALGIA OF MULTIPLE JOINTS: ICD-10-CM

## 2018-11-16 RX ORDER — TRAMADOL HYDROCHLORIDE 100 MG/1
TABLET, EXTENDED RELEASE ORAL
Qty: 30 TABLET | Refills: 1 | OUTPATIENT
Start: 2018-11-16

## 2018-11-21 DIAGNOSIS — M25.50 ARTHRALGIA OF MULTIPLE JOINTS: ICD-10-CM

## 2018-11-26 RX ORDER — TRAMADOL HYDROCHLORIDE 100 MG/1
100 TABLET, EXTENDED RELEASE ORAL NIGHTLY
Qty: 30 TABLET | Refills: 0 | Status: SHIPPED | OUTPATIENT
Start: 2018-11-26 | End: 2019-01-07 | Stop reason: SDUPTHER

## 2018-12-10 ENCOUNTER — OFFICE VISIT (OUTPATIENT)
Dept: OBSTETRICS AND GYNECOLOGY | Facility: CLINIC | Age: 58
End: 2018-12-10

## 2018-12-10 VITALS
DIASTOLIC BLOOD PRESSURE: 86 MMHG | BODY MASS INDEX: 30.66 KG/M2 | HEIGHT: 64 IN | SYSTOLIC BLOOD PRESSURE: 134 MMHG | WEIGHT: 179.6 LBS

## 2018-12-10 DIAGNOSIS — R87.610 ATYPICAL SQUAMOUS CELLS OF UNDETERMINED SIGNIFICANCE ON CYTOLOGIC SMEAR OF CERVIX (ASC-US): Primary | ICD-10-CM

## 2018-12-10 PROCEDURE — 57454 BX/CURETT OF CERVIX W/SCOPE: CPT | Performed by: OBSTETRICS & GYNECOLOGY

## 2018-12-10 NOTE — PROGRESS NOTES
Colposcopy Procedure Note    Procedures          Indications: Most recent Pap smear showed: ASCUS with POSITIVE high risk HPV.  Prior cervical/vaginal disease: 2/2016 LGSIL with REAGAN 1, no further f/u until then.  Prior cervical treatment: no treatment.    Procedure Details   The risks and benefits of the procedure and Verbal informed consent obtained.  Pregnancy test: not done    Speculum placed in vagina and excellent visualization of cervix achieved, cervix swabbed x 3 with acetic acid solution and Lugol's solution     Findings:  Cervix: acetowhite lesion(s) noted at 3 & 6 o'clock; cervix swabbed with Lugol's solution, endocervical curettage performed, cervical biopsies taken at 3& 6  o'clock, specimen labelled and sent to pathology and hemostasis achieved with Monsel's solution.  Vaginal inspection: normal without visible lesions.  Vulvar colposcopy: vulvar colposcopy not performed.    Specimens:  Cervical biopsies at 3 & 6 with ECC  Colpo impression REAGAN 1-2. Low threshold for CKC given limited followup. Pt tolerated proceduer well.     Complications: none.    Plan:  Specimens labelled and sent to Pathology.  Will base further treatment on Pathology findings.  Treatment options discussed with patient.  Post biopsy instructions given to patient.    Queta Brandt MD   12/10/2018  11:39 AM

## 2018-12-15 LAB
DX ICD CODE: NORMAL
DX ICD CODE: NORMAL
PATH REPORT.FINAL DX SPEC: NORMAL
PATH REPORT.GROSS SPEC: NORMAL
PATH REPORT.SITE OF ORIGIN SPEC: NORMAL
PATHOLOGIST NAME: NORMAL
PAYMENT PROCEDURE: NORMAL

## 2018-12-24 ENCOUNTER — LAB (OUTPATIENT)
Dept: INTERNAL MEDICINE | Facility: CLINIC | Age: 58
End: 2018-12-24

## 2018-12-24 DIAGNOSIS — E03.9 ADULT HYPOTHYROIDISM: ICD-10-CM

## 2018-12-24 DIAGNOSIS — E78.2 MIXED HYPERLIPIDEMIA: ICD-10-CM

## 2018-12-24 LAB
ALBUMIN SERPL-MCNC: 4.5 G/DL (ref 3.5–5.2)
ALBUMIN/GLOB SERPL: 1.6 G/DL
ALP SERPL-CCNC: 138 U/L (ref 40–129)
ALT SERPL-CCNC: 16 U/L (ref 5–33)
AST SERPL-CCNC: 23 U/L (ref 5–32)
BILIRUB SERPL-MCNC: 0.3 MG/DL (ref 0.2–1.2)
BUN SERPL-MCNC: 15 MG/DL (ref 6–20)
BUN/CREAT SERPL: 20.5 (ref 7–25)
CALCIUM SERPL-MCNC: 9.3 MG/DL (ref 8.6–10.5)
CHLORIDE SERPL-SCNC: 102 MMOL/L (ref 98–107)
CHOLEST SERPL-MCNC: 223 MG/DL (ref 0–200)
CHOLEST/HDLC SERPL: 4.05 {RATIO}
CO2 SERPL-SCNC: 29.1 MMOL/L (ref 22–29)
CREAT SERPL-MCNC: 0.73 MG/DL (ref 0.57–1)
GLOBULIN SER CALC-MCNC: 2.9 GM/DL
GLUCOSE SERPL-MCNC: 84 MG/DL (ref 65–99)
HDLC SERPL-MCNC: 55 MG/DL (ref 40–60)
LDLC SERPL CALC-MCNC: 132 MG/DL (ref 0–100)
POTASSIUM SERPL-SCNC: 4 MMOL/L (ref 3.5–5.2)
PROT SERPL-MCNC: 7.4 G/DL (ref 6–8.5)
SODIUM SERPL-SCNC: 141 MMOL/L (ref 136–145)
T4 SERPL-MCNC: 5.06 MCG/DL (ref 4.5–11.7)
TRIGL SERPL-MCNC: 180 MG/DL (ref 0–150)
TSH SERPL DL<=0.005 MIU/L-ACNC: 7.39 MIU/ML (ref 0.27–4.2)
VLDLC SERPL CALC-MCNC: 36 MG/DL (ref 7–27)

## 2018-12-25 NOTE — PROGRESS NOTES
PIP= cervical biopsies were normal. She needs a repeat pap and ECC in 6 months, please place in colpo recall.

## 2019-01-02 RX ORDER — LEVOTHYROXINE SODIUM 0.1 MG/1
TABLET ORAL
Qty: 30 TABLET | Refills: 1 | Status: SHIPPED | OUTPATIENT
Start: 2019-01-02 | End: 2019-03-19 | Stop reason: SDUPTHER

## 2019-01-07 ENCOUNTER — OFFICE VISIT (OUTPATIENT)
Dept: INTERNAL MEDICINE | Facility: CLINIC | Age: 59
End: 2019-01-07

## 2019-01-07 VITALS
BODY MASS INDEX: 31.89 KG/M2 | OXYGEN SATURATION: 98 % | TEMPERATURE: 98.4 F | WEIGHT: 180 LBS | SYSTOLIC BLOOD PRESSURE: 118 MMHG | HEIGHT: 63 IN | DIASTOLIC BLOOD PRESSURE: 80 MMHG | RESPIRATION RATE: 16 BRPM | HEART RATE: 93 BPM

## 2019-01-07 DIAGNOSIS — R76.8 POSITIVE ANTI-CCP TEST: ICD-10-CM

## 2019-01-07 DIAGNOSIS — M25.50 ARTHRALGIA OF MULTIPLE JOINTS: ICD-10-CM

## 2019-01-07 DIAGNOSIS — E78.2 MIXED HYPERLIPIDEMIA: Primary | ICD-10-CM

## 2019-01-07 DIAGNOSIS — E03.9 ADULT HYPOTHYROIDISM: ICD-10-CM

## 2019-01-07 DIAGNOSIS — N95.9 MENOPAUSAL DISORDER: ICD-10-CM

## 2019-01-07 PROCEDURE — 99214 OFFICE O/P EST MOD 30 MIN: CPT | Performed by: NURSE PRACTITIONER

## 2019-01-07 RX ORDER — TRAMADOL HYDROCHLORIDE 100 MG/1
100 TABLET, EXTENDED RELEASE ORAL NIGHTLY
Qty: 30 TABLET | Refills: 2 | Status: SHIPPED | OUTPATIENT
Start: 2019-01-07 | End: 2019-08-26 | Stop reason: SDUPTHER

## 2019-01-07 RX ORDER — EZETIMIBE 10 MG/1
10 TABLET ORAL DAILY
Qty: 90 TABLET | Refills: 3 | Status: SHIPPED | OUTPATIENT
Start: 2019-01-07 | End: 2020-02-27

## 2019-01-07 NOTE — PROGRESS NOTES
Chief Complaint   Patient presents with   • Hyperlipidemia   • Hypothyroidism       Subjective     Cecily Broussard is a 58 y.o. female being seen for a follow up appointment today regarding hyperlipidemia, arthralgias of multiple joints, and hyperlipidemia. She is currently on Questran 4 g daily for hyperlipidemia, but it is causing constipation. She would like to switch back to Zetia. Levoxyl 100 mcgs daily for Hypothyroidism, she misses it once or twice a week. . She has taken Ultram ER 100mg as needed for joint pains. She takes it once to twice a week for joint pains.     She is taking Effexor Xr 75mg daily for menopause flushing. She denies any hot flashes, unless she misses doses.       History of Present Illness     Allergies   Allergen Reactions   • Estrogens Other (See Comments)   • Statins Other (See Comments)     Joint pains         Current Outpatient Medications:   •  celecoxib (CeleBREX) 200 MG capsule, TAKE ONE CAPSULE BY MOUTH DAILY, Disp: 90 capsule, Rfl: 1  •  Cholecalciferol (VITAMIN D) 2000 UNITS capsule, Take 1 capsule by mouth Daily., Disp: 30 capsule, Rfl: 11  •  cholestyramine (QUESTRAN) 4 g packet, Take one packet daily, Disp: 90 packet, Rfl: 3  •  levothyroxine (SYNTHROID, LEVOTHROID) 100 MCG tablet, TAKE ONE TABLET BY MOUTH DAILY, Disp: 30 tablet, Rfl: 1  •  traMADol ER (ULTRAM-ER) 100 MG 24 hr tablet, Take 1 tablet by mouth Every Night., Disp: 30 tablet, Rfl: 0  •  venlafaxine XR (EFFEXOR-XR) 75 MG 24 hr capsule, TAKE ONE CAPSULE BY MOUTH DAILY, Disp: 90 capsule, Rfl: 1  •  cycloSPORINE (RESTASIS) 0.05 % ophthalmic emulsion, Apply  to eye., Disp: , Rfl:   •  sodium phosphates (OSMOPREP) 1.102-0.398 g tablet tablet, Starting at 3:00pm -4 tabs every 15 min for 5 doses (20 tabs), second dosing starting at 11:00pm 4 tabs every 15 min. For 2 doses (8 tabs), Disp: 28 tablet, Rfl: 0    The following portions of the patient's history were reviewed and updated as appropriate: allergies, current  medications, past family history, past medical history, past social history, past surgical history and problem list.    Review of Systems   Constitutional: Negative.    HENT: Negative.    Eyes: Negative.    Respiratory: Negative.    Cardiovascular: Negative.    Gastrointestinal: Negative.    Endocrine: Negative.    Genitourinary: Negative.    Musculoskeletal: Positive for arthralgias.   Skin: Negative.    Allergic/Immunologic: Negative.    Neurological: Negative.    Hematological: Negative.    Psychiatric/Behavioral: Positive for sleep disturbance.       Assessment     Physical Exam   Constitutional: She is oriented to person, place, and time. She appears well-developed and well-nourished. No distress.   HENT:   Head: Normocephalic.   Right Ear: External ear normal.   Left Ear: External ear normal.   Nose: Nose normal.   Mouth/Throat: Oropharynx is clear and moist. No oropharyngeal exudate.   Neck: Neck supple. No thyromegaly present.   Cardiovascular: Normal rate, regular rhythm and normal heart sounds.   No murmur heard.  Pulmonary/Chest: Effort normal and breath sounds normal. No stridor. No respiratory distress.   Musculoskeletal: She exhibits no edema or deformity.   Neurological: She is alert and oriented to person, place, and time.   Skin: Skin is warm and dry. No erythema.   Psychiatric: She has a normal mood and affect. Her behavior is normal.   Vitals reviewed.      Plan     Her fasting labs were reviewed with the patient from last week.     Cecily was seen today for hyperlipidemia and hypothyroidism.    Diagnoses and all orders for this visit:    Mixed hyperlipidemia  -     ezetimibe (ZETIA) 10 MG tablet; Take 1 tablet by mouth Daily.  -     Comprehensive metabolic panel; Future  -     Conv Lipid Panel w/ Chol/HDL Ratio; Future  -     CBC & Differential; Future    Adult hypothyroidism  -     T4 & TSH ([facility]); Future    Arthralgia of multiple joints  -     traMADol ER (ULTRAM-ER) 100 MG 24 hr tablet; Take  1 tablet by mouth Every Night.  -     CBC & Differential; Future    Positive anti-CCP test  -     traMADol ER (ULTRAM-ER) 100 MG 24 hr tablet; Take 1 tablet by mouth Every Night.  -     CBC & Differential; Future    Menopausal disorder  -     CBC & Differential; Future    Stop Questran 4 g daily due to constipation. Zetia once daily instead.    The patient has read and signed the [facility] Controlled Substance Contract.  I will continue to see patient for regular follow up appointments.  They are well controlled on their medication.  [place] is updated every 3 months. The patient is aware of the potential for addiction and dependence.      CPE in 6 months with fasting labs

## 2019-01-12 ENCOUNTER — RESULTS ENCOUNTER (OUTPATIENT)
Dept: INTERNAL MEDICINE | Facility: CLINIC | Age: 59
End: 2019-01-12

## 2019-01-12 DIAGNOSIS — E78.2 MIXED HYPERLIPIDEMIA: ICD-10-CM

## 2019-03-19 RX ORDER — LEVOTHYROXINE SODIUM 0.1 MG/1
TABLET ORAL
Qty: 90 TABLET | Refills: 1 | Status: SHIPPED | OUTPATIENT
Start: 2019-03-19 | End: 2019-11-05 | Stop reason: SDUPTHER

## 2019-03-29 ENCOUNTER — OFFICE VISIT (OUTPATIENT)
Dept: INTERNAL MEDICINE | Facility: CLINIC | Age: 59
End: 2019-03-29

## 2019-03-29 VITALS
HEART RATE: 97 BPM | HEIGHT: 63 IN | WEIGHT: 179 LBS | OXYGEN SATURATION: 94 % | DIASTOLIC BLOOD PRESSURE: 88 MMHG | TEMPERATURE: 97.7 F | BODY MASS INDEX: 31.71 KG/M2 | RESPIRATION RATE: 16 BRPM | SYSTOLIC BLOOD PRESSURE: 114 MMHG

## 2019-03-29 DIAGNOSIS — J40 BRONCHITIS: ICD-10-CM

## 2019-03-29 DIAGNOSIS — J01.00 ACUTE MAXILLARY SINUSITIS, RECURRENCE NOT SPECIFIED: Primary | ICD-10-CM

## 2019-03-29 PROCEDURE — 99213 OFFICE O/P EST LOW 20 MIN: CPT | Performed by: NURSE PRACTITIONER

## 2019-03-29 RX ORDER — CEFDINIR 300 MG/1
300 CAPSULE ORAL 2 TIMES DAILY
Qty: 20 CAPSULE | Refills: 0 | Status: SHIPPED | OUTPATIENT
Start: 2019-03-29 | End: 2019-08-02

## 2019-03-29 RX ORDER — BUDESONIDE AND FORMOTEROL FUMARATE DIHYDRATE 80; 4.5 UG/1; UG/1
2 AEROSOL RESPIRATORY (INHALATION)
Qty: 1 INHALER | Refills: 0 | COMMUNITY
Start: 2019-03-29 | End: 2019-08-02

## 2019-03-29 NOTE — PROGRESS NOTES
Chief Complaint   Patient presents with   • Sinusitis     X4 days   • Cough   • URI       Subjective   Cecily Broussard is a 59 y.o. female is being seen for an acute appointment for URI symptoms. This started Monday . She woke up with a cold, then she developed,chest congestion, nasal congestion, Sinus pressure. Associated SOA, wheezing. She took  advil cold and flu.     History of Present Illness     Current Outpatient Medications on File Prior to Visit   Medication Sig Dispense Refill   • celecoxib (CeleBREX) 200 MG capsule TAKE ONE CAPSULE BY MOUTH DAILY 90 capsule 1   • Cholecalciferol (VITAMIN D) 2000 UNITS capsule Take 1 capsule by mouth Daily. 30 capsule 11   • ezetimibe (ZETIA) 10 MG tablet Take 1 tablet by mouth Daily. 90 tablet 3   • levothyroxine (SYNTHROID, LEVOTHROID) 100 MCG tablet TAKE ONE TABLET BY MOUTH DAILY 90 tablet 1   • traMADol ER (ULTRAM-ER) 100 MG 24 hr tablet Take 1 tablet by mouth Every Night. 30 tablet 2   • venlafaxine XR (EFFEXOR-XR) 75 MG 24 hr capsule TAKE ONE CAPSULE BY MOUTH DAILY 90 capsule 1   • [DISCONTINUED] cycloSPORINE (RESTASIS) 0.05 % ophthalmic emulsion Apply  to eye.       No current facility-administered medications on file prior to visit.        The following portions of the patient's history were reviewed and updated as appropriate: allergies, current medications, past family history, past medical history, past social history, past surgical history and problem list.    Review of Systems   Constitutional: Positive for fatigue. Negative for fever.   HENT: Positive for congestion, postnasal drip, rhinorrhea, sinus pressure, sinus pain and sneezing.    Respiratory: Positive for shortness of breath and wheezing.    Gastrointestinal: Negative.    Endocrine: Negative.    Genitourinary: Negative.    Musculoskeletal: Negative.    Allergic/Immunologic: Positive for environmental allergies.   Neurological: Negative.    Hematological: Negative.    Psychiatric/Behavioral: Negative.         Objective   Physical Exam   Constitutional: She is oriented to person, place, and time. She appears well-developed and well-nourished. She appears ill.   HENT:   Nose: Mucosal edema and rhinorrhea present. Right sinus exhibits frontal sinus tenderness. Left sinus exhibits frontal sinus tenderness.   Mouth/Throat: Oropharyngeal exudate and posterior oropharyngeal edema present.   Neck: Neck supple. No thyromegaly present.   Cardiovascular: Normal rate, regular rhythm and normal heart sounds.   No murmur heard.  Pulmonary/Chest: Effort normal and breath sounds normal. No respiratory distress (dry cough). She has no wheezes. She has no rales.   Musculoskeletal: She exhibits no edema.   Neurological: She is alert and oriented to person, place, and time.   Skin: Skin is warm and dry.   Psychiatric: She has a normal mood and affect. Her behavior is normal.   Vitals reviewed.      Assessment/Plan   Cecily was seen today for sinusitis, cough and uri.    Diagnoses and all orders for this visit:    Acute maxillary sinusitis, recurrence not specified  -     cefdinir (OMNICEF) 300 MG capsule; Take 1 capsule by mouth 2 (Two) Times a Day.    Bronchitis  -     cefdinir (OMNICEF) 300 MG capsule; Take 1 capsule by mouth 2 (Two) Times a Day.  -     budesonide-formoterol (SYMBICORT) 80-4.5 MCG/ACT inhaler; Inhale 2 puffs 2 (Two) Times a Day.      Follow up as needed

## 2019-04-08 ENCOUNTER — RESULTS ENCOUNTER (OUTPATIENT)
Dept: INTERNAL MEDICINE | Facility: CLINIC | Age: 59
End: 2019-04-08

## 2019-04-08 DIAGNOSIS — M25.50 ARTHRALGIA OF MULTIPLE JOINTS: ICD-10-CM

## 2019-04-08 DIAGNOSIS — E78.2 MIXED HYPERLIPIDEMIA: ICD-10-CM

## 2019-04-08 DIAGNOSIS — R76.8 POSITIVE ANTI-CCP TEST: ICD-10-CM

## 2019-04-08 DIAGNOSIS — N95.9 MENOPAUSAL DISORDER: ICD-10-CM

## 2019-04-09 ENCOUNTER — RESULTS ENCOUNTER (OUTPATIENT)
Dept: INTERNAL MEDICINE | Facility: CLINIC | Age: 59
End: 2019-04-09

## 2019-04-09 DIAGNOSIS — E03.9 ADULT HYPOTHYROIDISM: ICD-10-CM

## 2019-04-23 RX ORDER — CELECOXIB 200 MG/1
CAPSULE ORAL
Qty: 90 CAPSULE | Refills: 1 | Status: SHIPPED | OUTPATIENT
Start: 2019-04-23 | End: 2019-12-24

## 2019-07-08 RX ORDER — VENLAFAXINE HYDROCHLORIDE 75 MG/1
CAPSULE, EXTENDED RELEASE ORAL
Qty: 90 CAPSULE | Refills: 1 | Status: SHIPPED | OUTPATIENT
Start: 2019-07-08 | End: 2020-02-27

## 2019-08-02 ENCOUNTER — OFFICE VISIT (OUTPATIENT)
Dept: INTERNAL MEDICINE | Facility: CLINIC | Age: 59
End: 2019-08-02

## 2019-08-02 VITALS
TEMPERATURE: 97.7 F | HEART RATE: 93 BPM | WEIGHT: 180 LBS | RESPIRATION RATE: 16 BRPM | DIASTOLIC BLOOD PRESSURE: 98 MMHG | HEIGHT: 63 IN | BODY MASS INDEX: 31.89 KG/M2 | SYSTOLIC BLOOD PRESSURE: 124 MMHG | OXYGEN SATURATION: 96 %

## 2019-08-02 DIAGNOSIS — J40 BRONCHITIS: Primary | ICD-10-CM

## 2019-08-02 PROCEDURE — 99213 OFFICE O/P EST LOW 20 MIN: CPT | Performed by: NURSE PRACTITIONER

## 2019-08-02 RX ORDER — CEFDINIR 300 MG/1
300 CAPSULE ORAL 2 TIMES DAILY
Qty: 20 CAPSULE | Refills: 0 | Status: SHIPPED | OUTPATIENT
Start: 2019-08-02 | End: 2019-09-04

## 2019-08-02 RX ORDER — MONTELUKAST SODIUM 10 MG/1
10 TABLET ORAL NIGHTLY
Qty: 30 TABLET | Refills: 0 | Status: SHIPPED | OUTPATIENT
Start: 2019-08-02 | End: 2020-05-07

## 2019-08-02 NOTE — PATIENT INSTRUCTIONS
Acute Bronchitis, Adult    Acute bronchitis is sudden (acute) swelling of the air tubes (bronchi) in the lungs. Acute bronchitis causes these tubes to fill with mucus, which can make it hard to breathe. It can also cause coughing or wheezing.  In adults, acute bronchitis usually goes away within 2 weeks. A cough caused by bronchitis may last up to 3 weeks. Smoking, allergies, and asthma can make the condition worse. Repeated episodes of bronchitis may cause further lung problems, such as chronic obstructive pulmonary disease (COPD).  What are the causes?  This condition can be caused by germs and by substances that irritate the lungs, including:  · Cold and flu viruses. This condition is most often caused by the same virus that causes a cold.  · Bacteria.  · Exposure to tobacco smoke, dust, fumes, and air pollution.  What increases the risk?  This condition is more likely to develop in people who:  · Have close contact with someone with acute bronchitis.  · Are exposed to lung irritants, such as tobacco smoke, dust, fumes, and vapors.  · Have a weak immune system.  · Have a respiratory condition such as asthma.  What are the signs or symptoms?  Symptoms of this condition include:  · A cough.  · Coughing up clear, yellow, or green mucus.  · Wheezing.  · Chest congestion.  · Shortness of breath.  · A fever.  · Body aches.  · Chills.  · A sore throat.  How is this diagnosed?  This condition is usually diagnosed with a physical exam. During the exam, your health care provider may order tests, such as chest X-rays, to rule out other conditions. He or she may also:  · Test a sample of your mucus for bacterial infection.  · Check the level of oxygen in your blood. This is done to check for pneumonia.  · Do a chest X-ray or lung function testing to rule out pneumonia and other conditions.  · Perform blood tests.  Your health care provider will also ask about your symptoms and medical history.  How is this treated?  Most  cases of acute bronchitis clear up over time without treatment. Your health care provider may recommend:  · Drinking more fluids. Drinking more makes your mucus thinner, which may make it easier to breathe.  · Taking a medicine for a fever or cough.  · Taking an antibiotic medicine.  · Using an inhaler to help improve shortness of breath and to control a cough.  · Using a cool mist vaporizer or humidifier to make it easier to breathe.  Follow these instructions at home:  Medicines  · Take over-the-counter and prescription medicines only as told by your health care provider.  · If you were prescribed an antibiotic, take it as told by your health care provider. Do not stop taking the antibiotic even if you start to feel better.  General instructions    · Get plenty of rest.  · Drink enough fluids to keep your urine pale yellow.  · Avoid smoking and secondhand smoke. Exposure to cigarette smoke or irritating chemicals will make bronchitis worse. If you smoke and you need help quitting, ask your health care provider. Quitting smoking will help your lungs heal faster.  · Use an inhaler, cool mist vaporizer, or humidifier as told by your health care provider.  · Keep all follow-up visits as told by your health care provider. This is important.  How is this prevented?  To lower your risk of getting this condition again:  · Wash your hands often with soap and water. If soap and water are not available, use hand .  · Avoid contact with people who have cold symptoms.  · Try not to touch your hands to your mouth, nose, or eyes.  · Make sure to get the flu shot every year.  Contact a health care provider if:  · Your symptoms do not improve in 2 weeks of treatment.  Get help right away if:  · You cough up blood.  · You have chest pain.  · You have severe shortness of breath.  · You become dehydrated.  · You faint or keep feeling like you are going to faint.  · You keep vomiting.  · You have a severe headache.  · Your  fever or chills gets worse.  This information is not intended to replace advice given to you by your health care provider. Make sure you discuss any questions you have with your health care provider.  Document Released: 01/25/2006 Document Revised: 08/01/2018 Document Reviewed: 06/07/2017  Elsevier Interactive Patient Education © 2019 [employer]vier Inc.

## 2019-08-02 NOTE — PROGRESS NOTES
Chief Complaint   Patient presents with   • Bronchitis   • Cough     X1 MO       Subjective   Cecily Broussard is a 59 y.o. female is being seen for an acute appointment for cough for 1 month. [facility] went to [facility]. Symptoms include laryngitis, dry cough, wheezing. Associated nasal congestion. She tried Nyquil as needed.     History of Present Illness     Current Outpatient Medications on File Prior to Visit   Medication Sig Dispense Refill   • celecoxib (CeleBREX) 200 MG capsule TAKE ONE CAPSULE BY MOUTH DAILY 90 capsule 1   • Cholecalciferol (VITAMIN D) 2000 UNITS capsule Take 1 capsule by mouth Daily. 30 capsule 11   • ezetimibe (ZETIA) 10 MG tablet Take 1 tablet by mouth Daily. 90 tablet 3   • levothyroxine (SYNTHROID, LEVOTHROID) 100 MCG tablet TAKE ONE TABLET BY MOUTH DAILY 90 tablet 1   • Pseudoeph-Doxylamine-DM-APAP (NYQUIL PO) Take  by mouth.     • traMADol ER (ULTRAM-ER) 100 MG 24 hr tablet Take 1 tablet by mouth Every Night. 30 tablet 2   • venlafaxine XR (EFFEXOR-XR) 75 MG 24 hr capsule TAKE ONE CAPSULE BY MOUTH DAILY 90 capsule 1   • [DISCONTINUED] budesonide-formoterol (SYMBICORT) 80-4.5 MCG/ACT inhaler Inhale 2 puffs 2 (Two) Times a Day. 1 inhaler 0   • [DISCONTINUED] cefdinir (OMNICEF) 300 MG capsule Take 1 capsule by mouth 2 (Two) Times a Day. 20 capsule 0     No current facility-administered medications on file prior to visit.        The following portions of the patient's history were reviewed and updated as appropriate: allergies, current medications, past family history, past medical history, past social history, past surgical history and problem list.    Review of Systems   Constitutional: Negative.    HENT: Positive for postnasal drip, rhinorrhea, sinus pressure and sinus pain. Negative for ear pain.    Respiratory: Positive for cough, shortness of breath and wheezing.    Cardiovascular: Negative.    Gastrointestinal: Negative.    Musculoskeletal: Negative.    Allergic/Immunologic:  Positive for environmental allergies.   Neurological: Negative.    Hematological: Negative.    Psychiatric/Behavioral: Negative.        Objective   Physical Exam   Constitutional: She is oriented to person, place, and time. She appears well-developed and well-nourished.   HENT:   Right Ear: A middle ear effusion is present.   Left Ear: A middle ear effusion is present.   Nose: Mucosal edema and rhinorrhea present. Right sinus exhibits maxillary sinus tenderness and frontal sinus tenderness. Left sinus exhibits maxillary sinus tenderness and frontal sinus tenderness.   Neck: Neck supple. No thyromegaly present.   Cardiovascular: Normal rate, regular rhythm and normal heart sounds.   No murmur heard.  Pulmonary/Chest: Effort normal and breath sounds normal. No stridor. No respiratory distress.   Lymphadenopathy:     She has cervical adenopathy.   Neurological: She is alert and oriented to person, place, and time.   Skin: Skin is warm and dry.   Psychiatric: She has a normal mood and affect. Her behavior is normal.   Vitals reviewed.      Assessment/Plan   Cecily was seen today for bronchitis and cough.    Diagnoses and all orders for this visit:    Bronchitis  -     montelukast (SINGULAIR) 10 MG tablet; Take 1 tablet by mouth Every Night.  -     cefdinir (OMNICEF) 300 MG capsule; Take 1 capsule by mouth 2 (Two) Times a Day.        Follow up as needed

## 2019-08-09 ENCOUNTER — TRANSCRIBE ORDERS (OUTPATIENT)
Dept: INTERNAL MEDICINE | Facility: CLINIC | Age: 59
End: 2019-08-09

## 2019-08-09 DIAGNOSIS — Z12.31 SCREENING MAMMOGRAM, ENCOUNTER FOR: Primary | ICD-10-CM

## 2019-08-16 ENCOUNTER — HOSPITAL ENCOUNTER (OUTPATIENT)
Dept: MAMMOGRAPHY | Facility: HOSPITAL | Age: 59
Discharge: HOME | End: 2019-08-16
Admitting: NURSE PRACTITIONER

## 2019-08-16 ENCOUNTER — TELEPHONE (OUTPATIENT)
Dept: INTERNAL MEDICINE | Facility: CLINIC | Age: 59
End: 2019-08-16

## 2019-08-16 DIAGNOSIS — Z12.31 SCREENING MAMMOGRAM, ENCOUNTER FOR: ICD-10-CM

## 2019-08-16 PROCEDURE — 77067 SCR MAMMO BI INCL CAD: CPT

## 2019-08-26 DIAGNOSIS — R76.8 POSITIVE ANTI-CCP TEST: ICD-10-CM

## 2019-08-26 DIAGNOSIS — M25.50 ARTHRALGIA OF MULTIPLE JOINTS: ICD-10-CM

## 2019-08-26 RX ORDER — TRAMADOL HYDROCHLORIDE 100 MG/1
TABLET, EXTENDED RELEASE ORAL
Qty: 30 TABLET | Refills: 1 | Status: SHIPPED | OUTPATIENT
Start: 2019-08-26 | End: 2019-11-18 | Stop reason: SDUPTHER

## 2019-09-04 ENCOUNTER — OFFICE VISIT (OUTPATIENT)
Dept: INTERNAL MEDICINE | Facility: CLINIC | Age: 59
End: 2019-09-04

## 2019-09-04 VITALS
TEMPERATURE: 98.2 F | RESPIRATION RATE: 16 BRPM | DIASTOLIC BLOOD PRESSURE: 82 MMHG | BODY MASS INDEX: 31.71 KG/M2 | SYSTOLIC BLOOD PRESSURE: 134 MMHG | HEART RATE: 105 BPM | HEIGHT: 63 IN | WEIGHT: 179 LBS | OXYGEN SATURATION: 95 %

## 2019-09-04 DIAGNOSIS — J02.0 STREPTOCOCCAL PHARYNGITIS: Primary | ICD-10-CM

## 2019-09-04 DIAGNOSIS — Z91.09 ENVIRONMENTAL ALLERGIES: ICD-10-CM

## 2019-09-04 LAB
EXPIRATION DATE: ABNORMAL
INTERNAL CONTROL: ABNORMAL
Lab: ABNORMAL
S PYO AG THROAT QL: POSITIVE

## 2019-09-04 PROCEDURE — 87880 STREP A ASSAY W/OPTIC: CPT | Performed by: NURSE PRACTITIONER

## 2019-09-04 PROCEDURE — 99213 OFFICE O/P EST LOW 20 MIN: CPT | Performed by: NURSE PRACTITIONER

## 2019-09-04 PROCEDURE — 96372 THER/PROPH/DIAG INJ SC/IM: CPT | Performed by: NURSE PRACTITIONER

## 2019-09-04 NOTE — PROGRESS NOTES
Chief Complaint   Patient presents with   • Bronchitis   • Cough   • Sore Throat       Subjective   Cecily Broussard is a 59 y.o. female is being seen for an acute appointment for sore throat and sinus congestion. This began Sunday after cleaning out a storage unit Saturday. Symptoms include swollen lymph nodes, sore throat, trouble swallowing and nasal congestion. She was exposed to dust and possibly mold. Denies fever, cough, SOA, wheezing, CP. She is taking Advil and Singulair 10mg .     History of Present Illness     Current Outpatient Medications on File Prior to Visit   Medication Sig Dispense Refill   • celecoxib (CeleBREX) 200 MG capsule TAKE ONE CAPSULE BY MOUTH DAILY 90 capsule 1   • Cholecalciferol (VITAMIN D) 2000 UNITS capsule Take 1 capsule by mouth Daily. 30 capsule 11   • ezetimibe (ZETIA) 10 MG tablet Take 1 tablet by mouth Daily. 90 tablet 3   • levothyroxine (SYNTHROID, LEVOTHROID) 100 MCG tablet TAKE ONE TABLET BY MOUTH DAILY 90 tablet 1   • montelukast (SINGULAIR) 10 MG tablet Take 1 tablet by mouth Every Night. 30 tablet 0   • traMADol ER (ULTRAM-ER) 100 MG 24 hr tablet TAKE ONE TABLET BY MOUTH ONCE NIGHTLY 30 tablet 1   • venlafaxine XR (EFFEXOR-XR) 75 MG 24 hr capsule TAKE ONE CAPSULE BY MOUTH DAILY 90 capsule 1   • [DISCONTINUED] cefdinir (OMNICEF) 300 MG capsule Take 1 capsule by mouth 2 (Two) Times a Day. 20 capsule 0   • [DISCONTINUED] Pseudoeph-Doxylamine-DM-APAP (NYQUIL PO) Take  by mouth.       No current facility-administered medications on file prior to visit.        The following portions of the patient's history were reviewed and updated as appropriate: allergies, current medications, past family history, past medical history, past social history, past surgical history and problem list.    Review of Systems   Constitutional: Negative for fever.   HENT: Positive for congestion, postnasal drip, rhinorrhea, sinus pressure, sinus pain, sneezing and sore throat. Negative for mouth sores.     Respiratory: Negative.  Negative for cough, shortness of breath, wheezing and stridor.    Cardiovascular: Negative.    Gastrointestinal: Negative.    Musculoskeletal: Positive for arthralgias.   Allergic/Immunologic: Positive for environmental allergies.   Hematological: Positive for adenopathy.       Objective   Physical Exam   Constitutional: She is oriented to person, place, and time. She appears well-developed and well-nourished.   HENT:   Head: Normocephalic.   Right Ear: External ear normal.   Left Ear: External ear normal.   Nose: Mucosal edema and rhinorrhea present. Right sinus exhibits frontal sinus tenderness.   Mouth/Throat: Mucous membranes are not cyanotic. Posterior oropharyngeal erythema present. Tonsils are 3+ on the right. Tonsils are 3+ on the left. No tonsillar exudate.   Cardiovascular: Normal rate, regular rhythm and normal heart sounds.   No murmur heard.  Pulmonary/Chest: Effort normal and breath sounds normal. No stridor. No respiratory distress.   Neurological: She is alert and oriented to person, place, and time.   Vitals reviewed.      Assessment/Plan   Cecily was seen today for bronchitis, cough and sore throat.    Diagnoses and all orders for this visit:    Streptococcal pharyngitis  -     Cancel: CBC & Differential  -     Cancel: Comprehensive Metabolic Panel  -     Mononucleosis Screen  -     POCT rapid strep A  -     penicillin G procaine-penicillin G benzathine (BICILLIN-CR) injection 1.2 Million Units    Environmental allergies  -     Cancel: Allergens, Zone 5        Change toothbrush in the morning. Strep discussed and AV materials given.        Follow up as needed

## 2019-09-04 NOTE — PATIENT INSTRUCTIONS
Strep Throat    Strep throat is a bacterial infection of the throat. Your health care provider may call the infection tonsillitis or pharyngitis, depending on whether there is swelling in the tonsils or at the back of the throat. Strep throat is most common during the cold months of the year in children who are 5-15 years of age, but it can happen during any season in people of any age. This infection is spread from person to person (contagious) through coughing, sneezing, or close contact.  What are the causes?  Strep throat is caused by the bacteria called Streptococcus pyogenes.  What increases the risk?  This condition is more likely to develop in:  · People who spend time in crowded places where the infection can spread easily.  · People who have close contact with someone who has strep throat.  What are the signs or symptoms?  Symptoms of this condition include:  · Fever or chills.  · Redness, swelling, or pain in the tonsils or throat.  · Pain or difficulty when swallowing.  · White or yellow spots on the tonsils or throat.  · Swollen, tender glands in the neck or under the jaw.  · Red rash all over the body (rare).  How is this diagnosed?  This condition is diagnosed by performing a rapid strep test or by taking a swab of your throat (throat culture test). Results from a rapid strep test are usually ready in a few minutes, but throat culture test results are available after one or two days.  How is this treated?  This condition is treated with antibiotic medicine.  Follow these instructions at home:  Medicines  · Take over-the-counter and prescription medicines only as told by your health care provider.  · Take your antibiotic as told by your health care provider. Do not stop taking the antibiotic even if you start to feel better.  · Have family members who also have a sore throat or fever tested for strep throat. They may need antibiotics if they have the strep infection.  Eating and drinking  · Do not  share food, drinking cups, or personal items that could cause the infection to spread to other people.  · If swallowing is difficult, try eating soft foods until your sore throat feels better.  · Drink enough fluid to keep your urine clear or pale yellow.  General instructions  · Gargle with a salt-water mixture 3-4 times per day or as needed. To make a salt-water mixture, completely dissolve ½-1 tsp of salt in 1 cup of warm water.  · Make sure that all household members wash their hands well.  · Get plenty of rest.  · Stay home from school or work until you have been taking antibiotics for 24 hours.  · Keep all follow-up visits as told by your health care provider. This is important.  Contact a health care provider if:  · The glands in your neck continue to get bigger.  · You develop a rash, cough, or earache.  · You cough up a thick liquid that is green, yellow-brown, or bloody.  · You have pain or discomfort that does not get better with medicine.  · Your problems seem to be getting worse rather than better.  · You have a fever.  Get help right away if:  · You have new symptoms, such as vomiting, severe headache, stiff or painful neck, chest pain, or shortness of breath.  · You have severe throat pain, drooling, or changes in your voice.  · You have swelling of the neck, or the skin on the neck becomes red and tender.  · You have signs of dehydration, such as fatigue, dry mouth, and decreased urination.  · You become increasingly sleepy, or you cannot wake up completely.  · Your joints become red or painful.  This information is not intended to replace advice given to you by your health care provider. Make sure you discuss any questions you have with your health care provider.  Document Released: 12/15/2001 Document Revised: 08/16/2017 Document Reviewed: 04/11/2016  [email or web address] Interactive Patient Education © 2019 [employer] Inc[employer]

## 2019-11-06 RX ORDER — LEVOTHYROXINE SODIUM 0.1 MG/1
TABLET ORAL
Qty: 30 TABLET | Refills: 0 | Status: SHIPPED | OUTPATIENT
Start: 2019-11-06 | End: 2020-04-24

## 2019-11-16 DIAGNOSIS — M25.50 ARTHRALGIA OF MULTIPLE JOINTS: ICD-10-CM

## 2019-11-18 RX ORDER — TRAMADOL HYDROCHLORIDE 100 MG/1
TABLET, EXTENDED RELEASE ORAL
Qty: 30 TABLET | Refills: 0 | Status: SHIPPED | OUTPATIENT
Start: 2019-11-18 | End: 2019-12-24

## 2019-12-20 DIAGNOSIS — M25.50 ARTHRALGIA OF MULTIPLE JOINTS: ICD-10-CM

## 2019-12-24 RX ORDER — CELECOXIB 200 MG/1
CAPSULE ORAL
Qty: 30 CAPSULE | Refills: 0 | Status: SHIPPED | OUTPATIENT
Start: 2019-12-24 | End: 2020-01-28

## 2019-12-24 RX ORDER — TRAMADOL HYDROCHLORIDE 100 MG/1
TABLET, EXTENDED RELEASE ORAL
Qty: 30 TABLET | Refills: 0 | Status: SHIPPED | OUTPATIENT
Start: 2019-12-24 | End: 2020-05-07 | Stop reason: SDUPTHER

## 2020-01-28 RX ORDER — CELECOXIB 200 MG/1
CAPSULE ORAL
Qty: 30 CAPSULE | Refills: 0 | Status: SHIPPED | OUTPATIENT
Start: 2020-01-28 | End: 2020-02-27

## 2020-02-27 DIAGNOSIS — M25.50 ARTHRALGIA OF MULTIPLE JOINTS: ICD-10-CM

## 2020-02-27 DIAGNOSIS — E78.2 MIXED HYPERLIPIDEMIA: ICD-10-CM

## 2020-02-27 RX ORDER — VENLAFAXINE HYDROCHLORIDE 75 MG/1
CAPSULE, EXTENDED RELEASE ORAL
Qty: 30 CAPSULE | Refills: 0 | Status: SHIPPED | OUTPATIENT
Start: 2020-02-27 | End: 2020-04-24

## 2020-02-27 RX ORDER — CELECOXIB 200 MG/1
CAPSULE ORAL
Qty: 30 CAPSULE | Refills: 0 | Status: SHIPPED | OUTPATIENT
Start: 2020-02-27 | End: 2020-04-24

## 2020-02-27 RX ORDER — EZETIMIBE 10 MG/1
TABLET ORAL
Qty: 90 TABLET | Refills: 2 | Status: SHIPPED | OUTPATIENT
Start: 2020-02-27 | End: 2020-05-07 | Stop reason: SDUPTHER

## 2020-02-28 RX ORDER — TRAMADOL HYDROCHLORIDE 100 MG/1
TABLET, EXTENDED RELEASE ORAL
Qty: 30 TABLET | Refills: 1 | OUTPATIENT
Start: 2020-02-28

## 2020-04-23 DIAGNOSIS — M25.50 ARTHRALGIA OF MULTIPLE JOINTS: ICD-10-CM

## 2020-04-24 RX ORDER — CELECOXIB 200 MG/1
CAPSULE ORAL
Qty: 30 CAPSULE | Refills: 0 | Status: SHIPPED | OUTPATIENT
Start: 2020-04-24 | End: 2020-05-07 | Stop reason: SDUPTHER

## 2020-04-24 RX ORDER — TRAMADOL HYDROCHLORIDE 100 MG/1
TABLET, EXTENDED RELEASE ORAL
Qty: 30 TABLET | Refills: 0 | OUTPATIENT
Start: 2020-04-24

## 2020-04-24 RX ORDER — VENLAFAXINE HYDROCHLORIDE 75 MG/1
CAPSULE, EXTENDED RELEASE ORAL
Qty: 30 CAPSULE | Refills: 0 | Status: SHIPPED | OUTPATIENT
Start: 2020-04-24 | End: 2020-05-07 | Stop reason: SDUPTHER

## 2020-04-24 RX ORDER — LEVOTHYROXINE SODIUM 0.1 MG/1
TABLET ORAL
Qty: 30 TABLET | Refills: 0 | Status: SHIPPED | OUTPATIENT
Start: 2020-04-24 | End: 2020-05-07 | Stop reason: SDUPTHER

## 2020-04-24 NOTE — TELEPHONE ENCOUNTER
LM on patient VM advising Lyubov refilled non-narcotic requests, but that patient needs to set up labs and then a subsequent video visit to refill Tramadol due to [place] laws.

## 2020-04-30 DIAGNOSIS — E55.9 VITAMIN D DEFICIENCY: ICD-10-CM

## 2020-04-30 DIAGNOSIS — E03.9 ADULT HYPOTHYROIDISM: ICD-10-CM

## 2020-04-30 DIAGNOSIS — E78.2 MIXED HYPERLIPIDEMIA: Primary | ICD-10-CM

## 2020-05-01 LAB
25(OH)D3+25(OH)D2 SERPL-MCNC: 31.6 NG/ML (ref 30–100)
ALBUMIN SERPL-MCNC: 4.6 G/DL (ref 3.5–5.2)
ALBUMIN/GLOB SERPL: 1.5 G/DL
ALP SERPL-CCNC: 128 U/L (ref 39–117)
ALT SERPL-CCNC: 20 U/L (ref 1–33)
AST SERPL-CCNC: 27 U/L (ref 1–32)
BASOPHILS # BLD AUTO: 0.03 10*3/MM3 (ref 0–0.2)
BASOPHILS NFR BLD AUTO: 0.7 % (ref 0–1.5)
BILIRUB SERPL-MCNC: 0.4 MG/DL (ref 0.2–1.2)
BUN SERPL-MCNC: 15 MG/DL (ref 8–23)
BUN/CREAT SERPL: 19.7 (ref 7–25)
CALCIUM SERPL-MCNC: 9.3 MG/DL (ref 8.6–10.5)
CHLORIDE SERPL-SCNC: 100 MMOL/L (ref 98–107)
CHOLEST SERPL-MCNC: 199 MG/DL (ref 0–200)
CO2 SERPL-SCNC: 30 MMOL/L (ref 22–29)
CREAT SERPL-MCNC: 0.76 MG/DL (ref 0.57–1)
EOSINOPHIL # BLD AUTO: 0.07 10*3/MM3 (ref 0–0.4)
EOSINOPHIL NFR BLD AUTO: 1.5 % (ref 0.3–6.2)
ERYTHROCYTE [DISTWIDTH] IN BLOOD BY AUTOMATED COUNT: 13.1 % (ref 12.3–15.4)
GLOBULIN SER CALC-MCNC: 3 GM/DL
GLUCOSE SERPL-MCNC: 93 MG/DL (ref 65–99)
HCT VFR BLD AUTO: 44.4 % (ref 34–46.6)
HDLC SERPL-MCNC: 57 MG/DL (ref 40–60)
HGB BLD-MCNC: 14.6 G/DL (ref 12–15.9)
IMM GRANULOCYTES # BLD AUTO: 0.01 10*3/MM3 (ref 0–0.05)
IMM GRANULOCYTES NFR BLD AUTO: 0.2 % (ref 0–0.5)
LDLC SERPL CALC-MCNC: 116 MG/DL (ref 0–100)
LDLC/HDLC SERPL: 2.04 {RATIO}
LYMPHOCYTES # BLD AUTO: 1.9 10*3/MM3 (ref 0.7–3.1)
LYMPHOCYTES NFR BLD AUTO: 41.3 % (ref 19.6–45.3)
MCH RBC QN AUTO: 27.3 PG (ref 26.6–33)
MCHC RBC AUTO-ENTMCNC: 32.9 G/DL (ref 31.5–35.7)
MCV RBC AUTO: 83.1 FL (ref 79–97)
MONOCYTES # BLD AUTO: 0.51 10*3/MM3 (ref 0.1–0.9)
MONOCYTES NFR BLD AUTO: 11.1 % (ref 5–12)
NEUTROPHILS # BLD AUTO: 2.08 10*3/MM3 (ref 1.7–7)
NEUTROPHILS NFR BLD AUTO: 45.2 % (ref 42.7–76)
NRBC BLD AUTO-RTO: 0 /100 WBC (ref 0–0.2)
PLATELET # BLD AUTO: 272 10*3/MM3 (ref 140–450)
POTASSIUM SERPL-SCNC: 4.9 MMOL/L (ref 3.5–5.2)
PROT SERPL-MCNC: 7.6 G/DL (ref 6–8.5)
RBC # BLD AUTO: 5.34 10*6/MM3 (ref 3.77–5.28)
SODIUM SERPL-SCNC: 140 MMOL/L (ref 136–145)
T4 FREE SERPL-MCNC: 0.97 NG/DL (ref 0.93–1.7)
TRIGL SERPL-MCNC: 130 MG/DL (ref 0–150)
TSH SERPL DL<=0.005 MIU/L-ACNC: 4.4 UIU/ML (ref 0.27–4.2)
VLDLC SERPL CALC-MCNC: 26 MG/DL (ref 5–40)
WBC # BLD AUTO: 4.6 10*3/MM3 (ref 3.4–10.8)

## 2020-05-07 ENCOUNTER — TELEMEDICINE (OUTPATIENT)
Dept: INTERNAL MEDICINE | Facility: CLINIC | Age: 60
End: 2020-05-07

## 2020-05-07 DIAGNOSIS — E03.9 ADULT HYPOTHYROIDISM: ICD-10-CM

## 2020-05-07 DIAGNOSIS — E78.2 MIXED HYPERLIPIDEMIA: Primary | ICD-10-CM

## 2020-05-07 DIAGNOSIS — M25.50 ARTHRALGIA OF MULTIPLE JOINTS: ICD-10-CM

## 2020-05-07 DIAGNOSIS — N95.9 MENOPAUSAL DISORDER: ICD-10-CM

## 2020-05-07 PROBLEM — J02.0 STREPTOCOCCAL PHARYNGITIS: Status: RESOLVED | Noted: 2019-09-04 | Resolved: 2020-05-07

## 2020-05-07 PROBLEM — J40 BRONCHITIS: Status: RESOLVED | Noted: 2019-08-02 | Resolved: 2020-05-07

## 2020-05-07 PROCEDURE — 99214 OFFICE O/P EST MOD 30 MIN: CPT | Performed by: NURSE PRACTITIONER

## 2020-05-07 RX ORDER — EZETIMIBE 10 MG/1
10 TABLET ORAL DAILY
Qty: 90 TABLET | Refills: 2 | Status: SHIPPED | OUTPATIENT
Start: 2020-05-07 | End: 2020-12-16 | Stop reason: SDUPTHER

## 2020-05-07 RX ORDER — VENLAFAXINE HYDROCHLORIDE 75 MG/1
75 CAPSULE, EXTENDED RELEASE ORAL DAILY
Qty: 90 CAPSULE | Refills: 1 | Status: SHIPPED | OUTPATIENT
Start: 2020-05-07 | End: 2020-12-16 | Stop reason: SDUPTHER

## 2020-05-07 RX ORDER — CELECOXIB 200 MG/1
200 CAPSULE ORAL DAILY
Qty: 90 CAPSULE | Refills: 1 | Status: SHIPPED | OUTPATIENT
Start: 2020-05-07 | End: 2020-12-16 | Stop reason: SDUPTHER

## 2020-05-07 RX ORDER — LEVOTHYROXINE SODIUM 0.1 MG/1
100 TABLET ORAL DAILY
Qty: 90 TABLET | Refills: 1 | Status: SHIPPED | OUTPATIENT
Start: 2020-05-07 | End: 2020-10-23

## 2020-05-07 RX ORDER — TRAMADOL HYDROCHLORIDE 100 MG/1
100 TABLET, EXTENDED RELEASE ORAL DAILY PRN
Qty: 30 TABLET | Refills: 0 | Status: SHIPPED | OUTPATIENT
Start: 2020-05-07 | End: 2020-06-19 | Stop reason: SDUPTHER

## 2020-05-07 NOTE — PROGRESS NOTES
Chief Complaint   Patient presents with   • Hypertension   • Hypothyroidism   • Osteoarthritis       Subjective     Cecily Broussard is a 60 y.o. female being seen for a follow up appointment today regarding Hyperlipidemia, hypothyroidism, menopausal symptoms,  and OA. She is on Zetia 10 mg once nightly for cholesterol. She is statin intolerant. She is walking daily 2 miles.     She is on Effexor XR 75 mg daily for hot flashes and she is doing well. No known side effects. She denies depression symptoms, hot flashes, agitation.     She is on Levothyroxine 100 mcg on an empty stomach, but admits to missing a few doses. She denies any weight changes. She does have dry skin.     She is on Celebrex 200 mg daily. She also takes Tramadol 100 mg ER as needed for severe pain, last refill Ultram since 12-. Daily pain ranges from 4-6 of 10. She reports that she has severe pain if she overworks in the yard. She dneis joint redness, swelling. Pain described as stiffness.       History of Present Illness     Allergies   Allergen Reactions   • Estrogens Other (See Comments)   • Statins Other (See Comments)     Joint pains         Current Outpatient Medications:   •  celecoxib (CeleBREX) 200 MG capsule, TAKE ONE CAPSULE BY MOUTH DAILY, Disp: 30 capsule, Rfl: 0  •  Cholecalciferol (VITAMIN D) 2000 UNITS capsule, Take 1 capsule by mouth Daily., Disp: 30 capsule, Rfl: 11  •  ezetimibe (ZETIA) 10 MG tablet, TAKE ONE TABLET BY MOUTH DAILY, Disp: 90 tablet, Rfl: 2  •  levothyroxine (SYNTHROID, LEVOTHROID) 100 MCG tablet, TAKE ONE TABLET BY MOUTH DAILY, Disp: 30 tablet, Rfl: 0  •  montelukast (SINGULAIR) 10 MG tablet, Take 1 tablet by mouth Every Night., Disp: 30 tablet, Rfl: 0  •  traMADol ER (ULTRAM-ER) 100 MG 24 hr tablet, TAKE ONE TABLET BY MOUTH ONCE NIGHTLY, Disp: 30 tablet, Rfl: 0  •  venlafaxine XR (EFFEXOR-XR) 75 MG 24 hr capsule, TAKE ONE CAPSULE BY MOUTH DAILY, Disp: 30 capsule, Rfl: 0    The following portions of the  patient's history were reviewed and updated as appropriate: allergies, current medications, past family history, past medical history, past social history, past surgical history and problem list.    Review of Systems   Constitutional: Negative.    HENT: Negative.    Eyes: Negative.  Negative for visual disturbance.   Respiratory: Negative for shortness of breath, wheezing and stridor.    Cardiovascular: Negative.  Negative for chest pain, palpitations and leg swelling.   Gastrointestinal: Negative.    Endocrine: Negative.    Genitourinary: Negative.    Musculoskeletal: Positive for arthralgias, back pain and neck stiffness. Negative for gait problem.   Skin: Negative.  Negative for color change (dry skin).   Allergic/Immunologic: Positive for environmental allergies.   Neurological: Negative.    Hematological: Negative.    Psychiatric/Behavioral: Negative.        Assessment     Physical Exam   Constitutional: She is oriented to person, place, and time. She appears well-developed.   Cardiovascular:   No murmur heard.  Neurological: She is alert and oriented to person, place, and time.   Psychiatric: She has a normal mood and affect. Her behavior is normal.       Plan     Her fasting labs were reviewed with the patient from last week. 20 minute video call    Cecily was seen today for hypothyroidism and osteoarthritis.    Diagnoses and all orders for this visit:    Mixed hyperlipidemia  -     ezetimibe (ZETIA) 10 MG tablet; Take 1 tablet by mouth Daily.    Adult hypothyroidism  -     levothyroxine (SYNTHROID, LEVOTHROID) 100 MCG tablet; Take 1 tablet by mouth Daily.    Arthralgia of multiple joints  -     traMADol ER (ULTRAM-ER) 100 MG 24 hr tablet; Take 1 tablet by mouth Daily As Needed for Moderate Pain .  -     celecoxib (CeleBREX) 200 MG capsule; Take 1 capsule by mouth Daily.    Menopausal disorder  -     venlafaxine XR (EFFEXOR-XR) 75 MG 24 hr capsule; Take 1 capsule by mouth Daily.      30 minute video  visit    Reviewed LDL goals, she is statin intolerant.     Encouraged yoga for exercise and joint pain reduciton.    Reviewed daily use of levothyroxine, not changing the dose due to missed doses.     The patient has read and signed the [facility] Controlled Substance Contract.  I will continue to see patient for regular follow up appointments.  They are well controlled on their medication.  MANDY is updated every 3 months. The patient is aware of the potential for addiction and dependence.    Follow up in 6 months labs and CPE

## 2020-06-19 DIAGNOSIS — M25.50 ARTHRALGIA OF MULTIPLE JOINTS: ICD-10-CM

## 2020-06-19 RX ORDER — TRAMADOL HYDROCHLORIDE 100 MG/1
100 TABLET, EXTENDED RELEASE ORAL DAILY PRN
Qty: 30 TABLET | Refills: 0 | Status: SHIPPED | OUTPATIENT
Start: 2020-06-19 | End: 2020-07-27

## 2020-07-26 DIAGNOSIS — M25.50 ARTHRALGIA OF MULTIPLE JOINTS: ICD-10-CM

## 2020-07-27 RX ORDER — TRAMADOL HYDROCHLORIDE 100 MG/1
TABLET, EXTENDED RELEASE ORAL
Qty: 30 TABLET | Refills: 0 | Status: SHIPPED | OUTPATIENT
Start: 2020-07-27 | End: 2020-11-24 | Stop reason: HOSPADM

## 2020-08-04 ENCOUNTER — TRANSCRIBE ORDERS (OUTPATIENT)
Dept: ADMINISTRATIVE | Facility: HOSPITAL | Age: 60
End: 2020-08-04

## 2020-08-04 DIAGNOSIS — Z12.31 VISIT FOR SCREENING MAMMOGRAM: Primary | ICD-10-CM

## 2020-09-10 ENCOUNTER — OFFICE VISIT (OUTPATIENT)
Dept: OBSTETRICS AND GYNECOLOGY | Facility: CLINIC | Age: 60
End: 2020-09-10

## 2020-09-10 VITALS
SYSTOLIC BLOOD PRESSURE: 122 MMHG | BODY MASS INDEX: 32.43 KG/M2 | DIASTOLIC BLOOD PRESSURE: 70 MMHG | HEIGHT: 63 IN | WEIGHT: 183 LBS

## 2020-09-10 DIAGNOSIS — Z11.51 SPECIAL SCREENING EXAMINATION FOR HUMAN PAPILLOMAVIRUS (HPV): ICD-10-CM

## 2020-09-10 DIAGNOSIS — Z01.419 ROUTINE GYNECOLOGICAL EXAMINATION: ICD-10-CM

## 2020-09-10 DIAGNOSIS — Z87.42 HISTORY OF ABNORMAL CERVICAL PAP SMEAR: ICD-10-CM

## 2020-09-10 DIAGNOSIS — Z01.419 PAP SMEAR, LOW-RISK: Primary | ICD-10-CM

## 2020-09-10 DIAGNOSIS — Z13.9 SCREENING FOR CONDITION: ICD-10-CM

## 2020-09-10 PROCEDURE — 81002 URINALYSIS NONAUTO W/O SCOPE: CPT | Performed by: OBSTETRICS & GYNECOLOGY

## 2020-09-10 PROCEDURE — 99396 PREV VISIT EST AGE 40-64: CPT | Performed by: OBSTETRICS & GYNECOLOGY

## 2020-09-10 NOTE — PROGRESS NOTES
GYN Annual Exam     CC- Here for annual exam.     Cecily Broussard is a 60 y.o. female established patient of practice who presents for annual well woman exam. She denies  VB. She is not on any HRT.  She had LGSIL pap in 2016 and had colpo bx with REAGAN 1.  She had no additional follow-up.  She then saw me for the first time in 2018 and had an ASCUS positive HPV Pap and normal colposcopic biopsies.  She has not followed up for any Pap smears until today.  We had a long discussion about the importance of close follow-up of abnormal Pap smears.  If this Pap smear comes back abnormal, I would recommend CKC in the OR.  She denies  VB.       OB History        2    Para   2    Term   2            AB        Living   2       SAB        TAB        Ectopic        Molar        Multiple        Live Births              Obstetric Comments   1   1 C/S             Menarche:13  Menopause:50  HRT:none  Current contraception: post menopausal status and tubal ligation  History of abnormal Pap smear: yes -  limited followup  History of abnormal mammogram: no  Family history of uterine, colon or ovarian cancer: no  Family history of breast cancer: yes - mom age 69, M aunt ? age  STD's: none  Last pap smear:2016- 10/2018- ASCUS + HPV, neg bx, no f/u  BRENT; none      Health Maintenance   Topic Date Due   • ZOSTER VACCINE (1 of 2) 2009   • ANNUAL PHYSICAL  2019   • Annual Gynecologic Pelvic and Breast Exam  10/23/2019   • INFLUENZA VACCINE  2020   • LIPID PANEL  2021   • MAMMOGRAM  2021   • PAP SMEAR  10/22/2021   • COLONOSCOPY  2022   • TDAP/TD VACCINES (2 - Td) 2023   • HEPATITIS C SCREENING  Completed       Past Medical History:   Diagnosis Date   • Abnormal Pap smear of cervix    • Arthritis    • Cervical dysplasia 2016    REAGAN 1on bx   • Depression    • Disease of thyroid gland    • Hyperlipidemia    • Menopause        Past Surgical History:   Procedure Laterality  Date   • ABDOMINOPLASTY     •  SECTION      x 1   • TUBAL ABDOMINAL LIGATION           Current Outpatient Medications:   •  celecoxib (CeleBREX) 200 MG capsule, Take 1 capsule by mouth Daily., Disp: 90 capsule, Rfl: 1  •  Cholecalciferol (VITAMIN D) 2000 UNITS capsule, Take 1 capsule by mouth Daily., Disp: 30 capsule, Rfl: 11  •  ezetimibe (ZETIA) 10 MG tablet, Take 1 tablet by mouth Daily., Disp: 90 tablet, Rfl: 2  •  levothyroxine (SYNTHROID, LEVOTHROID) 100 MCG tablet, Take 1 tablet by mouth Daily., Disp: 90 tablet, Rfl: 1  •  traMADol ER (ULTRAM-ER) 100 MG 24 hr tablet, TAKE ONE TABLET BY MOUTH DAILY FOR MODERATE PAIN, Disp: 30 tablet, Rfl: 0  •  venlafaxine XR (EFFEXOR-XR) 75 MG 24 hr capsule, Take 1 capsule by mouth Daily., Disp: 90 capsule, Rfl: 1    Allergies   Allergen Reactions   • Estrogens Other (See Comments)   • Statins Other (See Comments)     Joint pains       Social History     Tobacco Use   • Smoking status: Former Smoker     Years: 2.00     Last attempt to quit:      Years since quittin.7   Substance Use Topics   • Alcohol use: Yes     Comment: twice monthly   • Drug use: No       Family History   Problem Relation Age of Onset   • Breast cancer Mother 69   • Breast cancer Maternal Aunt    • Ovarian cancer Neg Hx    • Colon cancer Neg Hx    • Deep vein thrombosis Neg Hx    • Pulmonary embolism Neg Hx        Review of Systems   Constitutional: Positive for activity change. Negative for appetite change, fatigue, fever and unexpected weight change.   Respiratory: Negative for cough and shortness of breath.    Cardiovascular: Negative for chest pain and palpitations.   Gastrointestinal: Negative for abdominal distention, abdominal pain, constipation, diarrhea and nausea.   Endocrine: Negative for cold intolerance and heat intolerance.   Genitourinary: Negative for dyspareunia, dysuria, menstrual problem, pelvic pain, vaginal bleeding, vaginal discharge and vaginal pain.   Skin:  "Negative for color change and rash.   Neurological: Negative for headaches.   Psychiatric/Behavioral: Negative for dysphoric mood. The patient is not nervous/anxious.    All other systems reviewed and are negative.      /70   Ht 160 cm (63\")   Wt 83 kg (183 lb)   Breastfeeding No   BMI 32.42 kg/m²     Physical Exam   Constitutional: She is oriented to person, place, and time. She appears well-developed and well-nourished.   HENT:   Head: Normocephalic and atraumatic.   Eyes: Conjunctivae are normal. No scleral icterus.   Neck: Neck supple. No thyromegaly present.   Cardiovascular: Normal rate and regular rhythm.   Pulmonary/Chest: Effort normal and breath sounds normal. Right breast exhibits no inverted nipple, no mass, no nipple discharge, no skin change and no tenderness. Left breast exhibits no inverted nipple, no mass, no nipple discharge, no skin change and no tenderness.   Abdominal: Soft. Bowel sounds are normal. She exhibits no distension and no mass. There is no tenderness. There is no rebound and no guarding. No hernia.   S/P abdominoplasty   Genitourinary: Uterus normal. Pelvic exam was performed with patient supine. There is no rash, tenderness or lesion on the right labia. There is no rash, tenderness or lesion on the left labia. Cervix exhibits no motion tenderness, no discharge and no friability. Right adnexum displays no mass, no tenderness and no fullness. Left adnexum displays no mass, no tenderness and no fullness. No erythema, tenderness or bleeding in the vagina. No foreign body in the vagina. No signs of injury around the vagina. No vaginal discharge found.   Genitourinary Comments: Cystocele noted   Neurological: She is alert and oriented to person, place, and time.   Skin: Skin is warm and dry.   Psychiatric: She has a normal mood and affect. Her behavior is normal. Judgment and thought content normal.   Nursing note and vitals reviewed.         Assessment/Plan    1) GYN HM: check " pap/HPV.  Due to her age and poor follow-up, if this Pap abnormal, will proceed with CKC in the OR   SBE demonstrated and encouraged.  2) STD screening: accepts, does not feel she is at risk but wants it done.Condoms encouraged.  3) Bone health - Weight bearing exercise, dietary calcium recommendations and vitamin D reviewed.   4) Diet and Exercise discussed  5) Smoking Status: No   6) Social: no issues  7)MMG:  UTD 8/2019, BI-RADS 1.  Patient has mammogram scheduled next week.  8) DEXA-UTD 10/2019-normal DEXA.  Repeat in 3 to 5 years  9)C scope-patient was due in 2018 but did not go.  Would like to be referred to Dr. Noel, will arrange  10)Parts of this document have been copied or forwarded from her previous visits and have been reviewed, updated and edited as indicated.   11)I saw the patient with a face mask, gloves and eye protection  The patient herself was masked.  Social distancing was observed as appropriate.  12)Follow up prn and 1 year       Cecily was seen today for gynecologic exam.    Diagnoses and all orders for this visit:    Pap smear, low-risk  -     POC Urinalysis Dipstick  -     Pap IG, HPV-hr    Routine gynecological examination  -     POC Urinalysis Dipstick  -     Pap IG, HPV-hr    Special screening examination for human papillomavirus (HPV)  -     POC Urinalysis Dipstick  -     Pap IG, HPV-hr    Screening for condition  -     Mammo Screening Bilateral With CAD; Future  -     Ambulatory Referral For Screening Colonoscopy        Queta Brandt MD  9/10/2020  15:30

## 2020-09-14 ENCOUNTER — HOSPITAL ENCOUNTER (OUTPATIENT)
Dept: MAMMOGRAPHY | Facility: HOSPITAL | Age: 60
Discharge: HOME | End: 2020-09-14
Admitting: NURSE PRACTITIONER

## 2020-09-14 DIAGNOSIS — Z12.31 VISIT FOR SCREENING MAMMOGRAM: ICD-10-CM

## 2020-09-14 PROCEDURE — 77067 SCR MAMMO BI INCL CAD: CPT

## 2020-09-14 PROCEDURE — 77063 BREAST TOMOSYNTHESIS BI: CPT

## 2020-09-15 LAB
CYTOLOGIST CVX/VAG CYTO: ABNORMAL
CYTOLOGY CVX/VAG DOC CYTO: ABNORMAL
CYTOLOGY CVX/VAG DOC THIN PREP: ABNORMAL
DX ICD CODE: ABNORMAL
HIV 1 & 2 AB SER-IMP: ABNORMAL
HPV I/H RISK 1 DNA CVX QL PROBE+SIG AMP: POSITIVE
Lab: ABNORMAL
OTHER STN SPEC: ABNORMAL
STAT OF ADQ CVX/VAG CYTO-IMP: ABNORMAL

## 2020-09-16 NOTE — PROGRESS NOTES
PIP= patient has normal Pap but positive high risk HPV.  I would like to proceed with CKC in the OR as we discussed.  Please feel free to overbook her for preop appointment.

## 2020-10-12 ENCOUNTER — PREP FOR SURGERY (OUTPATIENT)
Dept: OTHER | Facility: HOSPITAL | Age: 60
End: 2020-10-12

## 2020-10-12 ENCOUNTER — OFFICE VISIT (OUTPATIENT)
Dept: OBSTETRICS AND GYNECOLOGY | Facility: CLINIC | Age: 60
End: 2020-10-12

## 2020-10-12 VITALS
HEIGHT: 63 IN | BODY MASS INDEX: 32.78 KG/M2 | DIASTOLIC BLOOD PRESSURE: 76 MMHG | WEIGHT: 185 LBS | SYSTOLIC BLOOD PRESSURE: 120 MMHG

## 2020-10-12 DIAGNOSIS — Z01.818 PREOPERATIVE EXAM FOR GYNECOLOGIC SURGERY: ICD-10-CM

## 2020-10-12 DIAGNOSIS — B97.7 HPV (HUMAN PAPILLOMA VIRUS) INFECTION: ICD-10-CM

## 2020-10-12 DIAGNOSIS — Z87.42 HISTORY OF ABNORMAL CERVICAL PAP SMEAR: Primary | ICD-10-CM

## 2020-10-12 DIAGNOSIS — R87.618 OTHER ABNORMAL CYTOLOGICAL FINDING OF SPECIMEN FROM CERVIX: Primary | ICD-10-CM

## 2020-10-12 PROBLEM — R87.619 ABNORMAL PAP SMEAR OF CERVIX: Status: ACTIVE | Noted: 2020-10-12

## 2020-10-12 PROCEDURE — 99213 OFFICE O/P EST LOW 20 MIN: CPT | Performed by: OBSTETRICS & GYNECOLOGY

## 2020-10-12 RX ORDER — SODIUM CHLORIDE 0.9 % (FLUSH) 0.9 %
3 SYRINGE (ML) INJECTION EVERY 12 HOURS SCHEDULED
Status: CANCELLED | OUTPATIENT
Start: 2020-10-12

## 2020-10-12 RX ORDER — CEFAZOLIN SODIUM 2 G/50ML
2 SOLUTION INTRAVENOUS ONCE
Status: CANCELLED | OUTPATIENT
Start: 2020-10-12 | End: 2020-10-12

## 2020-10-12 RX ORDER — SODIUM CHLORIDE 9 MG/ML
40 INJECTION, SOLUTION INTRAVENOUS AS NEEDED
Status: CANCELLED | OUTPATIENT
Start: 2020-10-12

## 2020-10-12 RX ORDER — SODIUM CHLORIDE 0.9 % (FLUSH) 0.9 %
1-10 SYRINGE (ML) INJECTION AS NEEDED
Status: CANCELLED | OUTPATIENT
Start: 2020-10-12

## 2020-10-12 NOTE — PROGRESS NOTES
"      Cecily Broussard is a 60 y.o. patient who presents for follow up of   Chief Complaint   Patient presents with   • Pre-op Exam     CKC   60-year-old established patient here for preop for CKC.  She had an LGSIL Pap in 2/2016 and had colpo biopsy with REAGAN-1.  She had no additional follow-up.  She then saw me for the first time on 10/2018 and had an ASCUS positive HPV Pap and normal colposcopic biopsies.  She then had lapsed care again until 9/20/2020.  This is a normal Pap with a positive high risk HPV.  We discussed that since she has continued to have abnormal Paps for greater than 4 years with limited follow-up, she would benefit from an excisional procedure to rule out significant dysplasia.  Plan CKC in the OR.          The following portions of the patient's history were reviewed and updated as appropriate: allergies, current medications and problem list.    Review of Systems   Constitutional: Positive for activity change. Negative for appetite change, fatigue, fever and unexpected weight change.   Respiratory: Negative for cough and shortness of breath.    Cardiovascular: Negative for chest pain and palpitations.   Gastrointestinal: Negative for abdominal distention, abdominal pain, constipation, diarrhea and nausea.   Endocrine: Negative for cold intolerance and heat intolerance.   Genitourinary: Negative for dyspareunia, dysuria, menstrual problem, pelvic pain, vaginal bleeding, vaginal discharge and vaginal pain.   Skin: Negative for color change and rash.   Neurological: Negative for headaches.   Psychiatric/Behavioral: Negative for dysphoric mood. The patient is not nervous/anxious.    All other systems reviewed and are negative.      /76   Ht 160 cm (63\")   Wt 83.9 kg (185 lb)   BMI 32.77 kg/m²     Physical Exam  Vitals signs and nursing note reviewed. Exam conducted with a chaperone present.   Constitutional:       Appearance: Normal appearance. She is well-developed. She is obese.   HENT:     "  Head: Normocephalic and atraumatic.   Eyes:      General: No scleral icterus.     Conjunctiva/sclera: Conjunctivae normal.   Neck:      Musculoskeletal: Neck supple.      Thyroid: No thyromegaly.   Cardiovascular:      Rate and Rhythm: Normal rate and regular rhythm.   Pulmonary:      Effort: Pulmonary effort is normal.      Breath sounds: Normal breath sounds.   Abdominal:      General: Bowel sounds are normal. There is no distension.      Palpations: Abdomen is soft. There is no mass.      Tenderness: There is no abdominal tenderness. There is no guarding or rebound.      Hernia: No hernia is present.   Skin:     General: Skin is warm and dry.   Neurological:      Mental Status: She is alert and oriented to person, place, and time.   Psychiatric:         Behavior: Behavior normal.         Thought Content: Thought content normal.         Judgment: Judgment normal.         A/P:  1. Persistently abnormal pap/HPV with limited follow-up-plan CKC in OR for both diagnosis and therapy. Risks, benefits and alternatives of the procedure were discussed, including , but not limited to: infection, bleeding, transfusion, injury to adjacent structures, laparotomy, possible non diagnostic findings, possible findings of unexpected malignancy, reoperation, recurrent symptoms, thromboembolic events, aneasthesic complications and death. Pre/intra/postop course was reviewed and all questions answered. Patient was encouraged to call for any additional questions she might have in the future.         Assessment/Plan   Cecily was seen today for pre-op exam.    Diagnoses and all orders for this visit:    History of abnormal cervical Pap smear    HPV (human papilloma virus) infection                 No follow-ups on file.      Queta Brandt MD    10/12/2020  14:48 EDT

## 2020-10-23 DIAGNOSIS — E03.9 ADULT HYPOTHYROIDISM: ICD-10-CM

## 2020-10-23 RX ORDER — LEVOTHYROXINE SODIUM 0.1 MG/1
TABLET ORAL
Qty: 90 TABLET | Refills: 0 | Status: SHIPPED | OUTPATIENT
Start: 2020-10-23 | End: 2020-12-16 | Stop reason: SDUPTHER

## 2020-11-02 DIAGNOSIS — Z00.00 ANNUAL PHYSICAL EXAM: Primary | ICD-10-CM

## 2020-11-06 ENCOUNTER — APPOINTMENT (OUTPATIENT)
Dept: PREADMISSION TESTING | Facility: HOSPITAL | Age: 60
End: 2020-11-06

## 2020-11-06 VITALS
HEART RATE: 88 BPM | SYSTOLIC BLOOD PRESSURE: 145 MMHG | RESPIRATION RATE: 16 BRPM | DIASTOLIC BLOOD PRESSURE: 81 MMHG | HEIGHT: 63 IN | BODY MASS INDEX: 32.89 KG/M2 | OXYGEN SATURATION: 96 % | WEIGHT: 185.6 LBS

## 2020-11-06 LAB
25(OH)D3 SERPL-MCNC: 31.4 NG/ML (ref 30–100)
ALBUMIN SERPL-MCNC: 4.2 G/DL (ref 3.5–5.2)
ALBUMIN/GLOB SERPL: 1.3 G/DL
ALP SERPL-CCNC: 147 U/L (ref 39–117)
ALT SERPL W P-5'-P-CCNC: 17 U/L (ref 1–33)
ANION GAP SERPL CALCULATED.3IONS-SCNC: 9.7 MMOL/L (ref 5–15)
AST SERPL-CCNC: 26 U/L (ref 1–32)
BILIRUB SERPL-MCNC: 0.4 MG/DL (ref 0–1.2)
BUN SERPL-MCNC: 14 MG/DL (ref 8–23)
BUN/CREAT SERPL: 20.3 (ref 7–25)
CALCIUM SPEC-SCNC: 9.4 MG/DL (ref 8.6–10.5)
CHLORIDE SERPL-SCNC: 104 MMOL/L (ref 98–107)
CHOLEST SERPL-MCNC: 187 MG/DL (ref 0–200)
CO2 SERPL-SCNC: 27.3 MMOL/L (ref 22–29)
CREAT SERPL-MCNC: 0.69 MG/DL (ref 0.57–1)
DEPRECATED RDW RBC AUTO: 41.1 FL (ref 37–54)
ERYTHROCYTE [DISTWIDTH] IN BLOOD BY AUTOMATED COUNT: 13.1 % (ref 12.3–15.4)
GFR SERPL CREATININE-BSD FRML MDRD: 86 ML/MIN/1.73
GLOBULIN UR ELPH-MCNC: 3.3 GM/DL
GLUCOSE SERPL-MCNC: 93 MG/DL (ref 65–99)
HCT VFR BLD AUTO: 47.2 % (ref 34–46.6)
HDLC SERPL-MCNC: 62 MG/DL (ref 40–60)
HGB BLD-MCNC: 14.8 G/DL (ref 12–15.9)
LDLC SERPL CALC-MCNC: 105 MG/DL (ref 0–100)
LDLC/HDLC SERPL: 1.65 {RATIO}
MCH RBC QN AUTO: 26.6 PG (ref 26.6–33)
MCHC RBC AUTO-ENTMCNC: 31.4 G/DL (ref 31.5–35.7)
MCV RBC AUTO: 84.7 FL (ref 79–97)
PLATELET # BLD AUTO: 240 10*3/MM3 (ref 140–450)
PMV BLD AUTO: 10.4 FL (ref 6–12)
POTASSIUM SERPL-SCNC: 4.3 MMOL/L (ref 3.5–5.2)
PROT SERPL-MCNC: 7.5 G/DL (ref 6–8.5)
QT INTERVAL: 392 MS
RBC # BLD AUTO: 5.57 10*6/MM3 (ref 3.77–5.28)
SODIUM SERPL-SCNC: 141 MMOL/L (ref 136–145)
T4 FREE SERPL-MCNC: 0.94 NG/DL (ref 0.93–1.7)
TRIGL SERPL-MCNC: 114 MG/DL (ref 0–150)
TSH SERPL DL<=0.05 MIU/L-ACNC: 0.36 UIU/ML (ref 0.27–4.2)
VLDLC SERPL-MCNC: 20 MG/DL (ref 5–40)
WBC # BLD AUTO: 4.8 10*3/MM3 (ref 3.4–10.8)

## 2020-11-06 PROCEDURE — 82306 VITAMIN D 25 HYDROXY: CPT | Performed by: NURSE PRACTITIONER

## 2020-11-06 PROCEDURE — 84443 ASSAY THYROID STIM HORMONE: CPT | Performed by: NURSE PRACTITIONER

## 2020-11-06 PROCEDURE — 80053 COMPREHEN METABOLIC PANEL: CPT | Performed by: NURSE PRACTITIONER

## 2020-11-06 PROCEDURE — 85027 COMPLETE CBC AUTOMATED: CPT | Performed by: OBSTETRICS & GYNECOLOGY

## 2020-11-06 PROCEDURE — 80061 LIPID PANEL: CPT | Performed by: NURSE PRACTITIONER

## 2020-11-06 PROCEDURE — 36415 COLL VENOUS BLD VENIPUNCTURE: CPT | Performed by: NURSE PRACTITIONER

## 2020-11-06 PROCEDURE — 84439 ASSAY OF FREE THYROXINE: CPT | Performed by: NURSE PRACTITIONER

## 2020-11-06 PROCEDURE — 93010 ELECTROCARDIOGRAM REPORT: CPT | Performed by: INTERNAL MEDICINE

## 2020-11-06 PROCEDURE — 93005 ELECTROCARDIOGRAM TRACING: CPT

## 2020-11-06 NOTE — PAT
Pt here for PAT visit.  Pre-op tests completed, chg soap given, and instructions reviewed.  Instructed clears until 6:00 am dos, voiced understanding.  COVID 11/14

## 2020-11-06 NOTE — DISCHARGE INSTRUCTIONS
PRE-ADMISSION TESTING INSTRUCTIONS FOR ADULTS    Take these medications the morning of surgery with a small sip of water: levothyroxine, zetia, and effexor      No aspirin, advil, aleve, ibuprofen, naproxen, diet pills, decongestants, or herbal/vitamins for a week prior to surgery.  Stop celebrex and vitamin D 7 days prior to day of surgery.    General Instructions:    • Do not eat solid food after midnight the night before surgery.  No gum, mints, or hard candy after midnight the night before surgery.  • You may drink clear liquids the day of surgery up until 2 hours before your arrival time.  (6:00 am)  • Clear liquids are liquids you can see through. Nothing RED in color.    Plain water    Sports drinks  Sodas     Gelatin (Jell-O)  Fruit juices without pulp such as white grape juice and apple juice  Popsicles that contain no fruit or yogurt  Tea or coffee (no cream or milk added)    • It is beneficial for you to have a clear drink that contains carbohydrates just before you leave your house and before your fasting time begins.  We suggest a 20 ounce bottle of Gatorade or Powerade for non-diabetic patients or a 20 ounce bottle of G2 or Powerade Zero for diabetic patients.   (6:00 am)    • Patients who avoid smoking, chewing tobacco and alcohol for 4 weeks prior to surgery have a reduced risk of post-operative complications.  If at all possible, quit smoking as many days before surgery as you can.    • Do not smoke, use chewing tobacco or drink alcohol the day of surgery    • Bring your C-PAP/ BI-PAP machine if you use one.  • Wear clean comfortable clothes and socks.  • Do not wear contact lenses, lotion, deodorant, or make-up.  Bring a case for your glasses if applicable. You may brush your teeth the morning of surgery.  • You may wear dentures/partials, do not put adhesive/glue on them.  • Bring crutches or walker if applicable.  • Leave all other jewelry and valuables at home.      Preventing a Surgical Site  Infection:    • Shower the night before and on the morning of surgery using the chlorhexidine soap you were given.  Use a clean washcloth with the soap.  Place clean sheets on your bed after showering the night before surgery. Do not use the CHG soap on your hair, face, or private areas. Wash your body gently for five (5) minutes. Do not scrub your skin.  Dry with a clean towel and dress in clean clothing.    • Do not shave the surgical area for 10 days-2 weeks prior to surgery  because the razor can irritate skin and make it easier to develop an infection.  • Make sure you, your family, and all healthcare providers clean their hands with soap and water or an alcohol based hand  before caring for you or your wound.      Day of surgery:    Your surgeon’s office will advise you of your arrival time for the day of surgery.    Upon arrival, a Pre-op nurse and Anesthesia provider will review your health history, obtain vital signs, and answer questions you may have.  The only belongings needed at this time will be your home medications and if applicable your C-PAP/BI-PAP machine.  If you are staying overnight your family can leave the rest of your belongings in the car and bring them to your room later.  A Pre-op nurse will start an IV and you may receive medication in preparation for surgery, including something to help you relax.  Your family will be able to see you in the Pre-op area.  While you are in surgery your family should notify the waiting room  if they leave the waiting room area and provide a contact phone number.    IF you have any questions, you can call the Pre-Admission Department at [phone] or your surgeon's office.  Notify your surgeon if  you become sick, have a fever, productive cough, or cannot be here the day of surgery    Please be aware that surgery does come with discomfort.  We want to make every effort to control your discomfort so please discuss any uncontrolled  symptoms with your nurse.   Your doctor will most likely have prescribed pain medications.      If you are going home after surgery, you will receive individualized written care instructions before being discharged.  A responsible adult (over the age of 18) must drive you to and from the hospital on the day of your surgery and stay with you for 24 hours after anesthesia.    If you are staying overnight following surgery, you will be transported to your hospital room following the recovery period.  [facility] has all private rooms.    You may receive a survey regarding the care you received. Your feedback is very important and will be used to collect the necessary data to help us to continue to provide excellent care.     Deductibles and co-payments are collected on the day of service. Please be prepared to pay the required co-pay, deductible or deposit on the day of service as defined by your plan.      Conization of the Cervix  Cervical conization is the cutting (excision) of a cone-shaped portion of the cervix. The procedure is performed through the vagina in either your health care provider's office or an operating room. This procedure is usually done when there is abnormal bleeding from the cervix. It can also be done to evaluate an abnormal Pap test or if an abnormality is seen on the cervix during an exam. The tissue is then examined to see if there are precancerous cells or cancer present.   Conization of the cervix is not done during a menstrual period or pregnancy.   LET YOUR HEALTH CARE PROVIDER KNOW ABOUT:  · Any allergies you have.    · All medicines you are taking, including vitamins, herbs, eye drops, creams, and over-the-counter medicines.    · Previous problems you or members of your family have had with the use of anesthetics.    · Any blood disorders you have.    · Previous surgeries you have had.    · Medical conditions you have.    · Your smoking habits.    · The possibility of being  pregnant.    RISKS AND COMPLICATIONS   Generally, conization of the cervix is a safe procedure. However, as with any procedure, complications can occur. Possible complications include:  · Heavy bleeding several days or weeks after the procedure. Light bleeding or spotting after the procedure is normal.  · Infection (rare).  · Damage to the cervix or surrounding organs (uncommon).    · Problems with the anesthesia.    · Increased risk of  labor in future pregnancies.  BEFORE THE PROCEDURE  · Do not eat or drink anything after midnight the night before the procedure.    · Do not take aspirin or blood thinners for at least a week before the procedure or as directed by your health care provider.    · Arrange for someone to take you home after the procedure.    PROCEDURE  There are three different methods to perform conization of the cervix. These include:   · The cold knife method. In this method a small cone-shaped sample of tissue is cut out with a knife (scalpel) from the cervical canal and the transformation zone (where the normal cells end and the abnormal cells begin).    · The LEEP method. In this method a small cone-shaped sample of tissue is cut out with a thin wire that can burn (cauterize) the cervical tissue with an electrical current.    · Laser treatment. In this method a small cone-shaped sample of tissue is cut out and then cauterized with a laser beam to prevent bleeding.    The procedure will be performed as follows:   · Depending on the method, you will either be given a medicine to make you sleep (general anesthetic) or a numbing medicine (local anesthetic). A medicine that numbs the cervix (cervical block) may be given.    · A lubricated device called a speculum will be inserted into the vagina to spread open the walls of the vagina. This will help your health care provider see the inside of the vagina and cervix better.    · The tissue from the cervix will be removed and examined.    · The  results of the procedure will help your health care provider decide if further treatment is necessary. They will also help your health care provider decide on the best treatment if your results are abnormal.  AFTER THE PROCEDURE  · If you had a general anesthetic, you may be groggy for 2-3 hours after the procedure.    · If you had a local anesthetic, you will rest at the clinic or hospital until you are stable and feel ready to go home.    · Recovery may take up to 3 weeks.    · You may have some cramping for about 1 week.    · You may have bloody discharge or light bleeding for 1-2 weeks.    · You may have black discharge coming from the vagina. This is from the paste used on the cervix to prevent bleeding. This is normal discharge.       This information is not intended to replace advice given to you by your health care provider. Make sure you discuss any questions you have with your health care provider.     Document Released: 09/27/2006 Document Revised: 12/23/2014 Document Reviewed: 06/13/2014  [employer] Patient Education ©2016 [employer]

## 2020-11-09 ENCOUNTER — TRANSCRIBE ORDERS (OUTPATIENT)
Dept: ADMINISTRATIVE | Facility: HOSPITAL | Age: 60
End: 2020-11-09

## 2020-11-09 DIAGNOSIS — Z01.818 PREOP EXAMINATION: Primary | ICD-10-CM

## 2020-11-14 ENCOUNTER — LAB (OUTPATIENT)
Dept: LAB | Facility: HOSPITAL | Age: 60
End: 2020-11-14

## 2020-11-14 DIAGNOSIS — Z01.818 PREOP EXAMINATION: ICD-10-CM

## 2020-11-14 PROCEDURE — U0004 COV-19 TEST NON-CDC HGH THRU: HCPCS | Performed by: OBSTETRICS & GYNECOLOGY

## 2020-11-14 PROCEDURE — C9803 HOPD COVID-19 SPEC COLLECT: HCPCS

## 2020-11-15 LAB — SARS-COV-2 RNA RESP QL NAA+PROBE: NOT DETECTED

## 2020-11-16 ENCOUNTER — TRANSCRIBE ORDERS (OUTPATIENT)
Dept: ADMINISTRATIVE | Facility: HOSPITAL | Age: 60
End: 2020-11-16

## 2020-11-16 ENCOUNTER — TELEPHONE (OUTPATIENT)
Dept: OBSTETRICS AND GYNECOLOGY | Facility: CLINIC | Age: 60
End: 2020-11-16

## 2020-11-16 ENCOUNTER — CLINICAL SUPPORT (OUTPATIENT)
Dept: OBSTETRICS AND GYNECOLOGY | Facility: CLINIC | Age: 60
End: 2020-11-16

## 2020-11-16 ENCOUNTER — ANESTHESIA EVENT (OUTPATIENT)
Dept: PERIOP | Facility: HOSPITAL | Age: 60
End: 2020-11-16

## 2020-11-16 DIAGNOSIS — U07.1 COVID-19: Primary | ICD-10-CM

## 2020-11-16 DIAGNOSIS — N30.01 ACUTE CYSTITIS WITH HEMATURIA: Primary | ICD-10-CM

## 2020-11-16 LAB
BILIRUB BLD-MCNC: NEGATIVE MG/DL
CLARITY, POC: CLEAR
COLOR UR: YELLOW
GLUCOSE UR STRIP-MCNC: NEGATIVE MG/DL
KETONES UR QL: ABNORMAL
LEUKOCYTE EST, POC: ABNORMAL
NITRITE UR-MCNC: NEGATIVE MG/ML
PH UR: 5 [PH] (ref 5–8)
PROT UR STRIP-MCNC: NEGATIVE MG/DL
RBC # UR STRIP: ABNORMAL /UL
SP GR UR: 1.02 (ref 1–1.03)
UROBILINOGEN UR QL: NORMAL

## 2020-11-16 PROCEDURE — 81002 URINALYSIS NONAUTO W/O SCOPE: CPT | Performed by: OBSTETRICS & GYNECOLOGY

## 2020-11-16 RX ORDER — PHENAZOPYRIDINE HYDROCHLORIDE 200 MG/1
200 TABLET, FILM COATED ORAL 3 TIMES DAILY PRN
Qty: 20 TABLET | Refills: 0 | Status: SHIPPED | OUTPATIENT
Start: 2020-11-16 | End: 2020-12-16

## 2020-11-16 RX ORDER — NITROFURANTOIN 25; 75 MG/1; MG/1
100 CAPSULE ORAL 2 TIMES DAILY
Qty: 14 CAPSULE | Refills: 0 | Status: ON HOLD | OUTPATIENT
Start: 2020-11-16 | End: 2020-11-24

## 2020-11-17 ENCOUNTER — ANESTHESIA (OUTPATIENT)
Dept: PERIOP | Facility: HOSPITAL | Age: 60
End: 2020-11-17

## 2020-11-21 ENCOUNTER — LAB (OUTPATIENT)
Dept: LAB | Facility: HOSPITAL | Age: 60
End: 2020-11-21

## 2020-11-21 DIAGNOSIS — U07.1 COVID-19: ICD-10-CM

## 2020-11-21 PROCEDURE — C9803 HOPD COVID-19 SPEC COLLECT: HCPCS

## 2020-11-21 PROCEDURE — U0004 COV-19 TEST NON-CDC HGH THRU: HCPCS | Performed by: OBSTETRICS & GYNECOLOGY

## 2020-11-23 LAB — SARS-COV-2 RNA RESP QL NAA+PROBE: NOT DETECTED

## 2020-11-23 PROCEDURE — S0260 H&P FOR SURGERY: HCPCS | Performed by: OBSTETRICS & GYNECOLOGY

## 2020-11-23 NOTE — H&P
Patient Care Team:  Lyubov Whelan APRN as PCP - General  Jinny Meade DO as Consulting Physician (Obstetrics and Gynecology)  Paul Laureano MD as Consulting Physician (Rheumatology)    Chief complaint persistently abnormal pap smear       HPI: 60-year-old established patient here for  CKC.  She had an LGSIL Pap in 2016 and had colpo biopsy with REAGAN-1.  She had no additional follow-up.  She then saw me for the first time on 10/2018 and had an ASCUS positive HPV Pap and normal colposcopic biopsies.  She then had lapsed care again until 2020.  This is a normal Pap with a positive high risk HPV.  We discussed that since she has continued to have abnormal Paps for greater than 4 years with limited follow-up, she would benefit from an excisional procedure to rule out significant dysplasia.  Plan CKC in the OR.         PMH:   Past Medical History:   Diagnosis Date   • Abnormal Pap smear of cervix    • Arthritis    • Cervical dysplasia 2016    REAGAN 1on bx   • Depression    • Disease of thyroid gland    • Hyperlipidemia    • Menopause          PSH:   Past Surgical History:   Procedure Laterality Date   • ABDOMINOPLASTY     •  SECTION      x 1   • COLONOSCOPY     • TUBAL ABDOMINAL LIGATION         SoHx:   Social History     Socioeconomic History   • Marital status: Single     Spouse name: Not on file   • Number of children: Not on file   • Years of education: Not on file   • Highest education level: Not on file   Tobacco Use   • Smoking status: Former Smoker     Years: 2.00     Types: Cigarettes     Quit date:      Years since quittin.9   • Smokeless tobacco: Never Used   Substance and Sexual Activity   • Alcohol use: Yes     Comment: twice monthly   • Drug use: No   • Sexual activity: Yes     Partners: Male     Birth control/protection: Post-menopausal, Surgical     Comment: BTL   Social History Narrative    She is working full time as a . She lives with her sister(5 years  younger).       FHx:   Family History   Problem Relation Age of Onset   • Breast cancer Mother 69   • Breast cancer Maternal Aunt    • Kidney cancer Sister    • Ovarian cancer Neg Hx    • Colon cancer Neg Hx    • Deep vein thrombosis Neg Hx    • Pulmonary embolism Neg Hx    • Malig Hyperthermia Neg Hx                  OB History         2    Para   2    Term   2            AB        Living   2        SAB        TAB        Ectopic        Molar        Multiple        Live Births               Obstetric Comments   1   1 C/S                Menarche:13  Menopause:50  HRT:none  Current contraception: post menopausal status and tubal ligation  History of abnormal Pap smear: yes -  limited followup  History of abnormal mammogram: no  Family history of uterine, colon or ovarian cancer: no  Family history of breast cancer: yes - mom age 69, M aunt ? age  STD's: none  Last pap smear: 2020- normal pap + HPV  BRENT; none         Allergies: Estrogens and Statins    Medications:   No current facility-administered medications on file prior to encounter.      Current Outpatient Medications on File Prior to Encounter   Medication Sig Dispense Refill   • celecoxib (CeleBREX) 200 MG capsule Take 1 capsule by mouth Daily. 90 capsule 1   • Cholecalciferol (VITAMIN D) 2000 UNITS capsule Take 1 capsule by mouth Daily. 30 capsule 11   • ezetimibe (ZETIA) 10 MG tablet Take 1 tablet by mouth Daily. 90 tablet 2   • traMADol ER (ULTRAM-ER) 100 MG 24 hr tablet TAKE ONE TABLET BY MOUTH DAILY FOR MODERATE PAIN (Patient taking differently: Take 100 mg by mouth Daily As Needed for Moderate Pain .) 30 tablet 0   • venlafaxine XR (EFFEXOR-XR) 75 MG 24 hr capsule Take 1 capsule by mouth Daily. 90 capsule 1       There were no vitals taken for this visit.    Review of Systems   Constitutional: Positive for activity change. Negative for appetite change, fatigue, fever and unexpected weight change.   Respiratory: Negative for cough and  shortness of breath.    Cardiovascular: Negative for chest pain and palpitations.   Gastrointestinal: Negative for abdominal distention, abdominal pain, constipation, diarrhea and nausea.   Endocrine: Negative for cold intolerance and heat intolerance.   Genitourinary: Negative for dyspareunia, dysuria, menstrual problem, pelvic pain, vaginal bleeding, vaginal discharge and vaginal pain.   Skin: Negative for color change and rash.   Neurological: Negative for headaches.   Psychiatric/Behavioral: Negative for dysphoric mood. The patient is not nervous/anxious.    All other systems reviewed and are negative.      Physical Exam  Vitals signs and nursing note reviewed. Exam conducted with a chaperone present.   Constitutional:       Appearance: Normal appearance. She is well-developed. She is obese.   HENT:      Head: Normocephalic and atraumatic.   Eyes:      General: No scleral icterus.     Conjunctiva/sclera: Conjunctivae normal.   Neck:      Musculoskeletal: Neck supple.      Thyroid: No thyromegaly.   Cardiovascular:      Rate and Rhythm: Normal rate and regular rhythm.   Pulmonary:      Effort: Pulmonary effort is normal.      Breath sounds: Normal breath sounds.   Abdominal:      General: Bowel sounds are normal. There is no distension.      Palpations: Abdomen is soft. There is no mass.      Tenderness: There is no abdominal tenderness. There is no guarding or rebound.      Hernia: No hernia is present.   Skin:     General: Skin is warm and dry.   Neurological:      Mental Status: She is alert and oriented to person, place, and time.   Psychiatric:         Behavior: Behavior normal.         Thought Content: Thought content normal.         Judgment: Judgment normal.         Debilities/Disabilities Identified: None    Labs:     Lab Results (last 7 days)     ** No results found for the last 168 hours. **          Assessment/Plan:     . Persistently abnormal pap/HPV with limited follow-up-plan CKC in OR for both  diagnosis and therapy. Risks, benefits and alternatives of the procedure were discussed, including , but not limited to: infection, bleeding, transfusion, injury to adjacent structures, laparotomy, possible non diagnostic findings, possible findings of unexpected malignancy, reoperation, recurrent symptoms, thromboembolic events, aneasthesic complications and death. Pre/intra/postop course was reviewed and all questions answered.    I discussed the patients findings and my recommendations with patient.     Queta Brandt MD  11/23/20  10:53 EST

## 2020-11-24 ENCOUNTER — HOSPITAL ENCOUNTER (OUTPATIENT)
Facility: HOSPITAL | Age: 60
Setting detail: HOSPITAL OUTPATIENT SURGERY
Discharge: HOME | End: 2020-11-24
Attending: OBSTETRICS & GYNECOLOGY | Admitting: OBSTETRICS & GYNECOLOGY

## 2020-11-24 VITALS
TEMPERATURE: 98.1 F | WEIGHT: 186.4 LBS | HEART RATE: 90 BPM | RESPIRATION RATE: 12 BRPM | OXYGEN SATURATION: 94 % | BODY MASS INDEX: 33.02 KG/M2 | SYSTOLIC BLOOD PRESSURE: 158 MMHG | DIASTOLIC BLOOD PRESSURE: 97 MMHG

## 2020-11-24 DIAGNOSIS — B97.7 HPV (HUMAN PAPILLOMA VIRUS) INFECTION: ICD-10-CM

## 2020-11-24 DIAGNOSIS — Z98.890 S/P CONIZATION OF CERVIX: Primary | ICD-10-CM

## 2020-11-24 DIAGNOSIS — R87.618 OTHER ABNORMAL CYTOLOGICAL FINDING OF SPECIMEN FROM CERVIX: ICD-10-CM

## 2020-11-24 PROCEDURE — 57520 CONIZATION OF CERVIX: CPT | Performed by: OBSTETRICS & GYNECOLOGY

## 2020-11-24 PROCEDURE — 25010000002 FENTANYL CITRATE (PF) 100 MCG/2ML SOLUTION: Performed by: NURSE ANESTHETIST, CERTIFIED REGISTERED

## 2020-11-24 PROCEDURE — 25010000002 PROPOFOL 10 MG/ML EMULSION: Performed by: NURSE ANESTHETIST, CERTIFIED REGISTERED

## 2020-11-24 PROCEDURE — 25010000003 CEFAZOLIN SODIUM-DEXTROSE 2-3 GM-%(50ML) RECONSTITUTED SOLUTION: Performed by: OBSTETRICS & GYNECOLOGY

## 2020-11-24 PROCEDURE — 88342 IMHCHEM/IMCYTCHM 1ST ANTB: CPT | Performed by: OBSTETRICS & GYNECOLOGY

## 2020-11-24 PROCEDURE — 25010000002 ONDANSETRON PER 1 MG: Performed by: NURSE ANESTHETIST, CERTIFIED REGISTERED

## 2020-11-24 PROCEDURE — 25010000002 DEXAMETHASONE PER 1 MG: Performed by: NURSE ANESTHETIST, CERTIFIED REGISTERED

## 2020-11-24 PROCEDURE — 88305 TISSUE EXAM BY PATHOLOGIST: CPT | Performed by: OBSTETRICS & GYNECOLOGY

## 2020-11-24 PROCEDURE — 88307 TISSUE EXAM BY PATHOLOGIST: CPT | Performed by: OBSTETRICS & GYNECOLOGY

## 2020-11-24 RX ORDER — FENTANYL CITRATE 50 UG/ML
25 INJECTION, SOLUTION INTRAMUSCULAR; INTRAVENOUS
Status: DISCONTINUED | OUTPATIENT
Start: 2020-11-24 | End: 2020-11-24 | Stop reason: HOSPADM

## 2020-11-24 RX ORDER — CEFAZOLIN SODIUM 2 G/50ML
2 SOLUTION INTRAVENOUS ONCE
Status: COMPLETED | OUTPATIENT
Start: 2020-11-24 | End: 2020-11-24

## 2020-11-24 RX ORDER — FENTANYL CITRATE 50 UG/ML
INJECTION, SOLUTION INTRAMUSCULAR; INTRAVENOUS AS NEEDED
Status: DISCONTINUED | OUTPATIENT
Start: 2020-11-24 | End: 2020-11-24 | Stop reason: SURG

## 2020-11-24 RX ORDER — SODIUM CHLORIDE, SODIUM LACTATE, POTASSIUM CHLORIDE, CALCIUM CHLORIDE 600; 310; 30; 20 MG/100ML; MG/100ML; MG/100ML; MG/100ML
100 INJECTION, SOLUTION INTRAVENOUS CONTINUOUS
Status: DISCONTINUED | OUTPATIENT
Start: 2020-11-24 | End: 2020-11-24 | Stop reason: HOSPADM

## 2020-11-24 RX ORDER — SODIUM CHLORIDE 0.9 % (FLUSH) 0.9 %
10 SYRINGE (ML) INJECTION EVERY 12 HOURS SCHEDULED
Status: DISCONTINUED | OUTPATIENT
Start: 2020-11-24 | End: 2020-11-24 | Stop reason: HOSPADM

## 2020-11-24 RX ORDER — SODIUM CHLORIDE, SODIUM LACTATE, POTASSIUM CHLORIDE, CALCIUM CHLORIDE 600; 310; 30; 20 MG/100ML; MG/100ML; MG/100ML; MG/100ML
9 INJECTION, SOLUTION INTRAVENOUS CONTINUOUS PRN
Status: DISCONTINUED | OUTPATIENT
Start: 2020-11-24 | End: 2020-11-24 | Stop reason: HOSPADM

## 2020-11-24 RX ORDER — SODIUM CHLORIDE 9 MG/ML
40 INJECTION, SOLUTION INTRAVENOUS AS NEEDED
Status: DISCONTINUED | OUTPATIENT
Start: 2020-11-24 | End: 2020-11-24 | Stop reason: HOSPADM

## 2020-11-24 RX ORDER — DEXAMETHASONE SODIUM PHOSPHATE 4 MG/ML
8 INJECTION, SOLUTION INTRA-ARTICULAR; INTRALESIONAL; INTRAMUSCULAR; INTRAVENOUS; SOFT TISSUE ONCE AS NEEDED
Status: COMPLETED | OUTPATIENT
Start: 2020-11-24 | End: 2020-11-24

## 2020-11-24 RX ORDER — LIDOCAINE HYDROCHLORIDE 20 MG/ML
INJECTION, SOLUTION INFILTRATION; PERINEURAL AS NEEDED
Status: DISCONTINUED | OUTPATIENT
Start: 2020-11-24 | End: 2020-11-24 | Stop reason: SURG

## 2020-11-24 RX ORDER — ONDANSETRON 2 MG/ML
4 INJECTION INTRAMUSCULAR; INTRAVENOUS ONCE AS NEEDED
Status: DISCONTINUED | OUTPATIENT
Start: 2020-11-24 | End: 2020-11-24 | Stop reason: HOSPADM

## 2020-11-24 RX ORDER — PROPOFOL 10 MG/ML
VIAL (ML) INTRAVENOUS AS NEEDED
Status: DISCONTINUED | OUTPATIENT
Start: 2020-11-24 | End: 2020-11-24 | Stop reason: SURG

## 2020-11-24 RX ORDER — SODIUM CHLORIDE 0.9 % (FLUSH) 0.9 %
3 SYRINGE (ML) INJECTION EVERY 12 HOURS SCHEDULED
Status: DISCONTINUED | OUTPATIENT
Start: 2020-11-24 | End: 2020-11-24 | Stop reason: HOSPADM

## 2020-11-24 RX ORDER — LIDOCAINE HYDROCHLORIDE 10 MG/ML
0.5 INJECTION, SOLUTION EPIDURAL; INFILTRATION; INTRACAUDAL; PERINEURAL ONCE AS NEEDED
Status: DISCONTINUED | OUTPATIENT
Start: 2020-11-24 | End: 2020-11-24 | Stop reason: HOSPADM

## 2020-11-24 RX ORDER — ONDANSETRON 2 MG/ML
4 INJECTION INTRAMUSCULAR; INTRAVENOUS ONCE AS NEEDED
Status: COMPLETED | OUTPATIENT
Start: 2020-11-24 | End: 2020-11-24

## 2020-11-24 RX ORDER — SODIUM CHLORIDE 0.9 % (FLUSH) 0.9 %
1-10 SYRINGE (ML) INJECTION AS NEEDED
Status: DISCONTINUED | OUTPATIENT
Start: 2020-11-24 | End: 2020-11-24 | Stop reason: HOSPADM

## 2020-11-24 RX ORDER — SODIUM CHLORIDE 0.9 % (FLUSH) 0.9 %
10 SYRINGE (ML) INJECTION AS NEEDED
Status: DISCONTINUED | OUTPATIENT
Start: 2020-11-24 | End: 2020-11-24 | Stop reason: HOSPADM

## 2020-11-24 RX ORDER — HYDROCODONE BITARTRATE AND ACETAMINOPHEN 5; 325 MG/1; MG/1
1 TABLET ORAL ONCE AS NEEDED
Status: COMPLETED | OUTPATIENT
Start: 2020-11-24 | End: 2020-11-24

## 2020-11-24 RX ORDER — PYRANTEL PAMOATE 100 %
POWDER (GRAM) MISCELLANEOUS AS NEEDED
Status: DISCONTINUED | OUTPATIENT
Start: 2020-11-24 | End: 2020-11-24 | Stop reason: HOSPADM

## 2020-11-24 RX ORDER — MAGNESIUM HYDROXIDE 1200 MG/15ML
LIQUID ORAL AS NEEDED
Status: DISCONTINUED | OUTPATIENT
Start: 2020-11-24 | End: 2020-11-24 | Stop reason: HOSPADM

## 2020-11-24 RX ORDER — FAMOTIDINE 10 MG/ML
20 INJECTION, SOLUTION INTRAVENOUS
Status: COMPLETED | OUTPATIENT
Start: 2020-11-24 | End: 2020-11-24

## 2020-11-24 RX ORDER — FERRIC SUBSULFATE 0.21 G/G
LIQUID TOPICAL AS NEEDED
Status: DISCONTINUED | OUTPATIENT
Start: 2020-11-24 | End: 2020-11-24 | Stop reason: HOSPADM

## 2020-11-24 RX ORDER — HYDROCODONE BITARTRATE AND ACETAMINOPHEN 5; 325 MG/1; MG/1
1 TABLET ORAL EVERY 4 HOURS PRN
Qty: 15 TABLET | Refills: 0 | Status: SHIPPED | OUTPATIENT
Start: 2020-11-24 | End: 2020-12-16

## 2020-11-24 RX ADMIN — LIDOCAINE HYDROCHLORIDE 100 MG: 20 INJECTION, SOLUTION INFILTRATION; PERINEURAL at 10:30

## 2020-11-24 RX ADMIN — PROPOFOL 100 MG: 10 INJECTION, EMULSION INTRAVENOUS at 10:32

## 2020-11-24 RX ADMIN — FENTANYL CITRATE 25 MCG: 50 INJECTION, SOLUTION INTRAMUSCULAR; INTRAVENOUS at 10:40

## 2020-11-24 RX ADMIN — FENTANYL CITRATE 25 MCG: 50 INJECTION, SOLUTION INTRAMUSCULAR; INTRAVENOUS at 11:21

## 2020-11-24 RX ADMIN — PROPOFOL 50 MG: 10 INJECTION, EMULSION INTRAVENOUS at 10:43

## 2020-11-24 RX ADMIN — FENTANYL CITRATE 25 MCG: 50 INJECTION, SOLUTION INTRAMUSCULAR; INTRAVENOUS at 11:34

## 2020-11-24 RX ADMIN — SODIUM CHLORIDE, POTASSIUM CHLORIDE, SODIUM LACTATE AND CALCIUM CHLORIDE 9 ML/HR: 600; 310; 30; 20 INJECTION, SOLUTION INTRAVENOUS at 09:11

## 2020-11-24 RX ADMIN — FENTANYL CITRATE 25 MCG: 50 INJECTION, SOLUTION INTRAMUSCULAR; INTRAVENOUS at 10:43

## 2020-11-24 RX ADMIN — ONDANSETRON 4 MG: 2 INJECTION, SOLUTION INTRAMUSCULAR; INTRAVENOUS at 09:43

## 2020-11-24 RX ADMIN — PROPOFOL 50 MG: 10 INJECTION, EMULSION INTRAVENOUS at 10:36

## 2020-11-24 RX ADMIN — PROPOFOL 50 MG: 10 INJECTION, EMULSION INTRAVENOUS at 10:47

## 2020-11-24 RX ADMIN — CEFAZOLIN SODIUM 2 G: 2 SOLUTION INTRAVENOUS at 10:36

## 2020-11-24 RX ADMIN — PROPOFOL 50 MG: 10 INJECTION, EMULSION INTRAVENOUS at 10:39

## 2020-11-24 RX ADMIN — DEXAMETHASONE SODIUM PHOSPHATE 8 MG: 4 INJECTION, SOLUTION INTRAMUSCULAR; INTRAVENOUS at 09:43

## 2020-11-24 RX ADMIN — FENTANYL CITRATE 25 MCG: 50 INJECTION, SOLUTION INTRAMUSCULAR; INTRAVENOUS at 10:36

## 2020-11-24 RX ADMIN — FAMOTIDINE 20 MG: 10 INJECTION INTRAVENOUS at 09:43

## 2020-11-24 RX ADMIN — HYDROCODONE BITARTRATE AND ACETAMINOPHEN 1 TABLET: 5; 325 TABLET ORAL at 11:15

## 2020-11-24 NOTE — OP NOTE
Subjective     Date of Service:  11/24/20  Time of Service:  10:59 EST    Surgical Staff: Surgeon(s) and Role:     * Queta Brandt MD - Primary   Additional Staff: None   Pre-operative diagnosis(es): Pre-Op Diagnosis Codes:     * Other abnormal cytological finding of specimen from cervix [R87.618]     * HPV (human papilloma virus) infection [B97.7]     Post-operative diagnosis(es): Post-Op Diagnosis Codes:     * Other abnormal cytological finding of specimen from cervix [R87.618]     * HPV (human papilloma virus) infection [B97.7]   Procedure(s): Procedure(s):  CERVICAL CONIZATION     Antibiotics: cefazolin (Ancef) ordered on call to OR     Anesthesia: Type: Choice  ASA:  II     Objective      Operative findings: Small, atrophic cervix, TZ not seen  Normal parametrium and vaginal vault   Specimens removed: ID Type Source Tests Collected by Time   A (Not marked as sent) : cervical cone Tissue Cervix TISSUE PATHOLOGY EXAM Queta Brandt MD 11/24/2020 1049   B (Not marked as sent) :  Curettings Endocervix TISSUE PATHOLOGY EXAM Queta Brandt MD 11/24/2020 1050      Fluid Intake: 200mL   Output: Documented Output  Est. Blood Loss 5mL  Urine Output 50 mL      I/O this shift:  In: 100 [I.V.:100]  Out: -      Blood products used: No   Drains: [REMOVED] Urethral Catheter Silicone 16 Fr. (Removed)      Implant Information: Nothing was implanted during the procedure   Complications: None apparent   Intraoperative consult(s): none   Condition: stable   Disposition: to PACU and then admit to  home         Assessment/Plan     After informed consent was obtained, the patient was taken to the operating room where IV anesthesia was administered without difficulty.  She was placed in yellowfin stirrups and prepped and draped in normal sterile fashion after normal bimanual exam. The parametrium is normal.  A bivalve speculum was placed in the vagina and the anterior lip of the cervix was grasped with  a single-tooth tenaculum.  The cervix was injected with dilute vasopressin.  The patient tolerated this well.  Stay sutures of 0 Vicryl were then placed at 3 and 9:00 and secured.  Lugol's was then placed on the cervix and the transformation zone  was not seen.  An 11 blade scalpel was then used to create a cone shaped defect and this was then amputated at the base and taken off the table intact to 12 with silk.  Endocervical curettings were then collected.  These were also sent for permanent section.  The cone bed was then cauterized with the rollerball.  The cone bed was hemostatic.  Monsel's was then placed in the cone bed and the stay sutures were cut with long ties.  All instruments were removed from the vagina.  The patient tolerated the procedure well and all counts were correct for the procedure.  The patient was extruded without difficulty and taken to recovery room in good condition.  Postop instructions and restrictions were reviewed the patient and family detail and all of her questions were answered.  The patient is to undergo pelvic rest for the next 2 weeks and follow-up in my office in 2 weeks for postop check.    Queta Brandt MD

## 2020-11-24 NOTE — ANESTHESIA PREPROCEDURE EVALUATION
Anesthesia Evaluation     Patient summary reviewed and Nursing notes reviewed   NPO Solid Status: > 8 hours  NPO Liquid Status: > 8 hours           Airway   Mallampati: II  TM distance: >3 FB  Neck ROM: full  No difficulty expected  Dental - normal exam     Pulmonary - negative pulmonary ROS    breath sounds clear to auscultation  Cardiovascular   Exercise tolerance: good (4-7 METS)    ECG reviewed  Rhythm: regular  Rate: normal        Neuro/Psych  GI/Hepatic/Renal/Endo    (+) obesity,   thyroid problem hypothyroidism    Musculoskeletal     Abdominal    Substance History - negative use     OB/GYN negative ob/gyn ROS         Other   arthritis,      ROS/Med Hx Other: ECG 12 Lead  Order: [id number]  Status:  Final result   Visible to patient:  Yes (MyChart) Next appt:  12/10/2020 at 10:00 AM in Obstetrics and Gynecology (Queta Brandt MD)  Component   Ref Range & Units 11/6/20 1025  QT Interval   ms 392     Narrative & Impression      HEART RATE= 86  bpm  RR Interval= 696  ms  ID Interval= 172  ms  P Horizontal Axis= -5  deg  P Front Axis= 50  deg  QRSD Interval= 97  ms  QT Interval= 392  ms  QRS Axis= -30  deg  T Wave Axis= 68  deg  - BORDERLINE ECG -  Sinus rhythm  Probable left atrial enlargement  Left axis deviation  NO PRIOR TRACING AVAILABLE FOR COMPARISON  Electronically Signed By: Bhakti Hausre ([facility]) 06-Nov-2020 16:16:07  Date and Time of Study: 2020-11-06 10:25:56    Specimen Collected: 11/06/20 10:25                          Anesthesia Plan    ASA 2     MAC   (Patient consents for MAC with   GA backup)  intravenous induction     Anesthetic plan, all risks, benefits, and alternatives have been provided, discussed and informed consent has been obtained with: patient.    Plan discussed with CRNA.

## 2020-11-24 NOTE — ANESTHESIA POSTPROCEDURE EVALUATION
Patient: Cecily Broussard    Procedure Summary     Date: 11/24/20 Room / Location:  [facility] OR 3 /  [facility]    Anesthesia Start: 1029 Anesthesia Stop: 1102    Procedure: CERVICAL CONIZATION (N/A Cervix) Diagnosis:       Other abnormal cytological finding of specimen from cervix      HPV (human papilloma virus) infection      (Other abnormal cytological finding of specimen from cervix [R87.618])      (HPV (human papilloma virus) infection [B97.7])    Surgeon: Queta Brandt MD Provider: Frances Culp CRNA    Anesthesia Type: MAC ASA Status: 2          Anesthesia Type: MAC    Vitals  Vitals Value Taken Time   /94 11/24/20 1140   Temp     Pulse 80 11/24/20 1140   Resp 21 11/24/20 1140   SpO2 95 % 11/24/20 1140           Post Anesthesia Care and Evaluation    Patient location during evaluation: PHASE II  Patient participation: complete - patient participated  Level of consciousness: awake  Pain score: 3  Pain management: satisfactory to patient  Airway patency: patent  Anesthetic complications: No anesthetic complications  PONV Status: none  Cardiovascular status: acceptable  Respiratory status: acceptable  Hydration status: acceptable

## 2020-11-27 LAB
LAB AP CASE REPORT: NORMAL
PATH REPORT.FINAL DX SPEC: NORMAL
PATH REPORT.GROSS SPEC: NORMAL

## 2020-11-30 NOTE — PROGRESS NOTES
PIP= pt has high grade change on her cone biopsy, no evidence of cancer. One margin is positive, will discuss further treatment options at her postop visit

## 2020-12-05 DIAGNOSIS — M25.50 ARTHRALGIA OF MULTIPLE JOINTS: ICD-10-CM

## 2020-12-07 RX ORDER — TRAMADOL HYDROCHLORIDE 100 MG/1
TABLET, EXTENDED RELEASE ORAL
Qty: 30 TABLET | Refills: 0 | OUTPATIENT
Start: 2020-12-07

## 2020-12-08 DIAGNOSIS — M25.50 ARTHRALGIA OF MULTIPLE JOINTS: Primary | ICD-10-CM

## 2020-12-08 RX ORDER — TRAMADOL HYDROCHLORIDE 100 MG/1
100 TABLET, EXTENDED RELEASE ORAL DAILY
Qty: 30 TABLET | Refills: 0 | Status: SHIPPED | OUTPATIENT
Start: 2020-12-08 | End: 2021-03-10

## 2020-12-10 ENCOUNTER — OFFICE VISIT (OUTPATIENT)
Dept: OBSTETRICS AND GYNECOLOGY | Facility: CLINIC | Age: 60
End: 2020-12-10

## 2020-12-10 VITALS
HEIGHT: 63 IN | BODY MASS INDEX: 32.96 KG/M2 | SYSTOLIC BLOOD PRESSURE: 130 MMHG | DIASTOLIC BLOOD PRESSURE: 80 MMHG | WEIGHT: 186 LBS

## 2020-12-10 DIAGNOSIS — Z09 POSTOPERATIVE FOLLOW-UP: ICD-10-CM

## 2020-12-10 DIAGNOSIS — Z13.9 SCREENING FOR CONDITION: ICD-10-CM

## 2020-12-10 DIAGNOSIS — Z98.890 S/P CONIZATION OF CERVIX: ICD-10-CM

## 2020-12-10 DIAGNOSIS — R30.0 DYSURIA: Primary | ICD-10-CM

## 2020-12-10 LAB
BILIRUB BLD-MCNC: NEGATIVE MG/DL
CLARITY, POC: CLEAR
COLOR UR: YELLOW
GLUCOSE UR STRIP-MCNC: NEGATIVE MG/DL
KETONES UR QL: NEGATIVE
LEUKOCYTE EST, POC: ABNORMAL
NITRITE UR-MCNC: NEGATIVE MG/ML
PH UR: 5 [PH] (ref 5–8)
PROT UR STRIP-MCNC: ABNORMAL MG/DL
RBC # UR STRIP: ABNORMAL /UL
SP GR UR: 1.03 (ref 1–1.03)
UROBILINOGEN UR QL: NORMAL

## 2020-12-10 PROCEDURE — 99024 POSTOP FOLLOW-UP VISIT: CPT | Performed by: OBSTETRICS & GYNECOLOGY

## 2020-12-10 PROCEDURE — 81002 URINALYSIS NONAUTO W/O SCOPE: CPT | Performed by: OBSTETRICS & GYNECOLOGY

## 2020-12-10 RX ORDER — NITROFURANTOIN 25; 75 MG/1; MG/1
100 CAPSULE ORAL 2 TIMES DAILY
Qty: 14 CAPSULE | Refills: 0 | Status: SHIPPED | OUTPATIENT
Start: 2020-12-10 | End: 2020-12-16

## 2020-12-10 NOTE — PROGRESS NOTES
"Surgical follow up visit     Cecily Broussard is a 60 y.o. female who presents to the clinic 2 weeks status post Cold Knife Cone for abnormal pap smear Eating a regular diet without difficulty. Bowel movements are normal. The patient is not having any pain..  Vaginal bleeding is minimal. She has having some back pain and pain with urination.     The following portions of the patient's history were reviewed and updated as appropriate: allergies, current medications, past family history, past medical history, past social history, past surgical history and problem list.    Review of Systems  Review of Systems   Constitutional: Positive for activity change. Negative for appetite change, fatigue, fever and unexpected weight change.   Eyes: Negative for photophobia and visual disturbance.   Respiratory: Negative for cough and shortness of breath.    Cardiovascular: Negative for chest pain and palpitations.   Gastrointestinal: Negative for abdominal distention, abdominal pain, constipation, diarrhea and nausea.   Endocrine: Negative for cold intolerance and heat intolerance.   Genitourinary: Positive for dysuria, frequency, vaginal bleeding (spotting) and vaginal discharge. Negative for dyspareunia, menstrual problem and pelvic pain.   Musculoskeletal: Negative for back pain.   Skin: Negative for color change and rash.   Neurological: Negative for headaches.   Hematological: Negative for adenopathy. Does not bruise/bleed easily.   Psychiatric/Behavioral: Negative for dysphoric mood. The patient is not nervous/anxious.         Objective    /80   Ht 160 cm (62.99\")   Wt 84.4 kg (186 lb)   LMP  (LMP Unknown)   Breastfeeding No   BMI 32.96 kg/m²     Physical Exam  Vitals signs and nursing note reviewed. Exam conducted with a chaperone present.   Constitutional:       Appearance: Normal appearance. She is well-developed. She is obese.   HENT:      Head: Normocephalic and atraumatic.   Eyes:      General: No scleral " icterus.     Conjunctiva/sclera: Conjunctivae normal.   Neck:      Thyroid: No thyromegaly.   Abdominal:      General: There is no distension.      Palpations: Abdomen is soft. There is no mass.      Tenderness: There is no abdominal tenderness. There is no guarding or rebound.      Hernia: No hernia is present.   Genitourinary:     General: Normal vulva.      Vagina: No signs of injury and foreign body. Tenderness present. No vaginal discharge, erythema, bleeding, lesions or prolapsed vaginal walls.      Cervix: Discharge and friability present.      Uterus: Normal.       Adnexa: Right adnexa normal and left adnexa normal.      Comments: Some suture material removed  Skin:     General: Skin is warm and dry.   Neurological:      Mental Status: She is alert and oriented to person, place, and time.   Psychiatric:         Mood and Affect: Mood normal.         Behavior: Behavior normal.         Thought Content: Thought content normal.         Judgment: Judgment normal.         Assessment     1) Post op Cold Knife Cone- path shows REAGAN 2 with negative margins and neg ECC. We discussed that the risk of recurrent dysplasia is about 10 % with negative margins.  We discussed that she will need repeat paps every 6 months until several in a row are normal with negative HPV.   2) Dysuria- check UA and CS. Rx Macrobid 100 mg BID x 7 days.        Plan    1. Continue any current medications.  2. Wound care discussed.  3. Activity restrictions: none  4. Anticipated return to work: now.  5. Follow up: 6 month(s) for repeat pap.     Queta Brandt MD     12/10/2020     12:49 EST

## 2020-12-13 LAB
BACTERIA UR CULT: ABNORMAL
BACTERIA UR CULT: ABNORMAL
OTHER ANTIBIOTIC SUSC ISLT: ABNORMAL

## 2020-12-16 ENCOUNTER — OFFICE VISIT (OUTPATIENT)
Dept: INTERNAL MEDICINE | Facility: CLINIC | Age: 60
End: 2020-12-16

## 2020-12-16 VITALS
HEART RATE: 89 BPM | SYSTOLIC BLOOD PRESSURE: 130 MMHG | DIASTOLIC BLOOD PRESSURE: 88 MMHG | TEMPERATURE: 97.3 F | RESPIRATION RATE: 16 BRPM | WEIGHT: 186 LBS | OXYGEN SATURATION: 95 % | BODY MASS INDEX: 32.96 KG/M2 | HEIGHT: 63 IN

## 2020-12-16 DIAGNOSIS — M25.50 ARTHRALGIA OF MULTIPLE JOINTS: ICD-10-CM

## 2020-12-16 DIAGNOSIS — N95.9 MENOPAUSAL DISORDER: ICD-10-CM

## 2020-12-16 DIAGNOSIS — E03.9 ADULT HYPOTHYROIDISM: Primary | ICD-10-CM

## 2020-12-16 DIAGNOSIS — E78.2 MIXED HYPERLIPIDEMIA: ICD-10-CM

## 2020-12-16 PROBLEM — R87.619 ABNORMAL PAP SMEAR OF CERVIX: Status: RESOLVED | Noted: 2020-10-12 | Resolved: 2020-12-16

## 2020-12-16 PROCEDURE — 99214 OFFICE O/P EST MOD 30 MIN: CPT | Performed by: NURSE PRACTITIONER

## 2020-12-16 RX ORDER — LEVOTHYROXINE SODIUM 0.1 MG/1
100 TABLET ORAL DAILY
Qty: 90 TABLET | Refills: 3 | Status: SHIPPED | OUTPATIENT
Start: 2020-12-16 | End: 2022-03-04

## 2020-12-16 RX ORDER — EZETIMIBE 10 MG/1
10 TABLET ORAL DAILY
Qty: 90 TABLET | Refills: 2 | Status: SHIPPED | OUTPATIENT
Start: 2020-12-16 | End: 2022-10-31

## 2020-12-16 RX ORDER — CELECOXIB 200 MG/1
200 CAPSULE ORAL DAILY
Qty: 90 CAPSULE | Refills: 1 | Status: SHIPPED | OUTPATIENT
Start: 2020-12-16 | End: 2021-10-04

## 2020-12-16 RX ORDER — VENLAFAXINE HYDROCHLORIDE 75 MG/1
75 CAPSULE, EXTENDED RELEASE ORAL DAILY
Qty: 90 CAPSULE | Refills: 1 | Status: SHIPPED | OUTPATIENT
Start: 2020-12-16 | End: 2021-10-04

## 2020-12-16 NOTE — PROGRESS NOTES
Follow up on Hypothyroidism    Subjective     Cecily Broussard is a 60 y.o. female being seen for a follow up appointment today regarding Hypothyroidism, hyperlpidemia, menopausal symptoms, and OA of multiple sites. She is on Zetia for hyperlipidemia. She is statin intolerant. She wals 12, 000 steps a day.     She is taking Celebrex daily for OA. She uses Ultram as needed for severe. breakthru pain. Daily OA pain ranges for 4-6 of 10. She described diffuse joinbt stiffnes and pain    She takes Effexor Xr for Menopausal symptoms. She denies any hot flashes, Chest pains, SOA. She had a cervical conization 11-.     History of Present Illness     Allergies   Allergen Reactions   • Estrogens Other (See Comments)     Mother had breast cancer   • Statins Myalgia     Joint pains         Current Outpatient Medications:   •  celecoxib (CeleBREX) 200 MG capsule, Take 1 capsule by mouth Daily., Disp: 90 capsule, Rfl: 1  •  Cholecalciferol (VITAMIN D) 2000 UNITS capsule, Take 1 capsule by mouth Daily., Disp: 30 capsule, Rfl: 11  •  ezetimibe (ZETIA) 10 MG tablet, Take 1 tablet by mouth Daily., Disp: 90 tablet, Rfl: 2  •  HYDROcodone-acetaminophen (NORCO) 5-325 MG per tablet, Take 1 tablet by mouth Every 4 (Four) Hours As Needed for Severe Pain  (Pain)., Disp: 15 tablet, Rfl: 0  •  levothyroxine (SYNTHROID, LEVOTHROID) 100 MCG tablet, TAKE ONE TABLET BY MOUTH DAILY (Patient taking differently: Take 100 mcg by mouth Daily.), Disp: 90 tablet, Rfl: 0  •  nitrofurantoin, macrocrystal-monohydrate, (MACROBID) 100 MG capsule, Take 1 capsule by mouth 2 (Two) Times a Day for 7 days., Disp: 14 capsule, Rfl: 0  •  phenazopyridine (Pyridium) 200 MG tablet, Take 1 tablet by mouth 3 (Three) Times a Day As Needed for Bladder Spasms., Disp: 20 tablet, Rfl: 0  •  traMADol ER (ULTRAM-ER) 100 MG 24 hr tablet, Take 1 tablet by mouth Daily., Disp: 30 tablet, Rfl: 0  •  venlafaxine XR (EFFEXOR-XR) 75 MG 24 hr capsule, Take 1 capsule by mouth  Daily., Disp: 90 capsule, Rfl: 1    The following portions of the patient's history were reviewed and updated as appropriate: allergies, current medications, past family history, past medical history, past social history, past surgical history and problem list.    Review of Systems   Constitutional: Negative.  Negative for fatigue, fever and unexpected weight change.   HENT: Negative.  Negative for congestion and dental problem.    Eyes: Negative.    Respiratory: Negative.  Negative for apnea and chest tightness.    Cardiovascular: Negative.  Negative for chest pain, palpitations and leg swelling.   Gastrointestinal: Negative.    Endocrine: Negative.    Genitourinary: Negative.    Musculoskeletal: Positive for arthralgias and back pain.   Skin: Negative.    Allergic/Immunologic: Positive for environmental allergies.   Neurological: Negative.    Hematological: Negative.  Negative for adenopathy. Does not bruise/bleed easily.   Psychiatric/Behavioral: Negative.        Assessment     Physical Exam  Vitals signs reviewed.   Constitutional:       Appearance: Normal appearance. She is not ill-appearing.   HENT:      Head: Normocephalic.      Right Ear: Tympanic membrane normal.      Left Ear: Tympanic membrane normal.   Neck:      Musculoskeletal: Neck supple.   Cardiovascular:      Rate and Rhythm: Normal rate and regular rhythm.      Pulses: Normal pulses.      Heart sounds: Normal heart sounds. No murmur.   Pulmonary:      Effort: Pulmonary effort is normal. No respiratory distress.      Breath sounds: Normal breath sounds. No stridor. No wheezing or rhonchi.   Musculoskeletal: Normal range of motion.         General: No swelling.   Skin:     General: Skin is warm and dry.      Coloration: Skin is not jaundiced or pale.      Findings: No erythema.   Neurological:      Mental Status: She is alert.   Psychiatric:         Mood and Affect: Mood normal.         Thought Content: Thought content normal.         Judgment:  Judgment normal.         Plan     Her fasting labs were reviewed with the patient from last week.     Diagnoses and all orders for this visit:    1. Adult hypothyroidism (Primary)  -     levothyroxine (SYNTHROID, LEVOTHROID) 100 MCG tablet; Take 1 tablet by mouth Daily.  Dispense: 90 tablet; Refill: 3    2. Arthralgia of multiple joints  -     celecoxib (CeleBREX) 200 MG capsule; Take 1 capsule by mouth Daily.  Dispense: 90 capsule; Refill: 1    3. Mixed hyperlipidemia  -     ezetimibe (ZETIA) 10 MG tablet; Take 1 tablet by mouth Daily.  Dispense: 90 tablet; Refill: 2    4. Menopausal disorder  -     venlafaxine XR (EFFEXOR-XR) 75 MG 24 hr capsule; Take 1 capsule by mouth Daily.  Dispense: 90 capsule; Refill: 1    Her cholesterol has steadily improved over the past year.     Coninue Celebrex daily and ultram as needed for breakthru pain.     The patient has read and signed the [facility] Controlled Substance Contract.  I will continue to see patient for regular follow up appointments.  They are well controlled on their medication.  MANDY is updated every 3 months. The patient is aware of the potential for addiction and dependence.    Set up a CPE in 6 months

## 2021-02-02 ENCOUNTER — TELEPHONE (OUTPATIENT)
Dept: GASTROENTEROLOGY | Facility: CLINIC | Age: 61
End: 2021-02-02

## 2021-02-02 NOTE — TELEPHONE ENCOUNTER
CALL FROM PATIENT.  NEEDS TO SCHEDULE COLONOSCOPY.  SHE STATES MAILED PAPERWORK COUPLE MONTHS AGO.  SOME CALLED HER LAST MONTH TO SCHEDULE.  REVIEW CHART.  WAS SCHEDULED IN 2018, BUT CANCEL DUE TO INSURANCE.  EXPLAINED WAS OTHER OFFICE.  E-MAILED FAST TRACK PACKET TO HER [email or web address] TODAY.

## 2021-02-04 ENCOUNTER — TELEPHONE (OUTPATIENT)
Dept: GASTROENTEROLOGY | Facility: CLINIC | Age: 61
End: 2021-02-04

## 2021-02-05 ENCOUNTER — PREP FOR SURGERY (OUTPATIENT)
Dept: OTHER | Facility: HOSPITAL | Age: 61
End: 2021-02-05

## 2021-02-05 DIAGNOSIS — Z12.11 ENCOUNTER FOR SCREENING FOR MALIGNANT NEOPLASM OF COLON: Primary | ICD-10-CM

## 2021-02-05 DIAGNOSIS — Z86.010 PERSONAL HISTORY OF COLONIC POLYPS: ICD-10-CM

## 2021-02-05 PROBLEM — Z86.0100 PERSONAL HISTORY OF COLONIC POLYPS: Status: ACTIVE | Noted: 2021-02-05

## 2021-02-05 NOTE — TELEPHONE ENCOUNTER
CALLED AND SPOKE WITH PATIENT.  SCHEDULED AT [place] ON 06/25/2021 AT 11:45AM - ARRIVE 10:30AM.  E-MAIL INSTRUCTIONS [email or web address] TODAY.    COVID TEST ON 06/23/2021, WILL CALL WITH TIME.  NEED TO SELF QUARANTINE UNTIL AFTER PROCEDURE.  SHE UNDERSTANDS.

## 2021-03-10 DIAGNOSIS — M25.50 ARTHRALGIA OF MULTIPLE JOINTS: ICD-10-CM

## 2021-03-10 RX ORDER — TRAMADOL HYDROCHLORIDE 100 MG/1
TABLET, EXTENDED RELEASE ORAL
Qty: 30 TABLET | Refills: 0 | Status: SHIPPED | OUTPATIENT
Start: 2021-03-10 | End: 2021-05-25

## 2021-03-11 ENCOUNTER — TELEPHONE (OUTPATIENT)
Dept: GASTROENTEROLOGY | Facility: CLINIC | Age: 61
End: 2021-03-11

## 2021-03-11 NOTE — TELEPHONE ENCOUNTER
CALLED AND SPOKE WITH PATIENT.  SCHEDULED FOR COLONOSCOY ON 06/25/2021. DR. CASPER WILL BE OUT OF TOWN, NEED TO RESCHEDULE.    SCHEDULED AT [place] ON 07/08/2021 AT 10:15AM, ARRIVE 9AM.  SHE CORRECTED HER COPY OF INSTRUCTIONS.    COVID TEST ON 07/06/2021, WILL CALL WITH TIME.  NEED TO SELF QUARANTINE UNTIL AFTER PROCEDURE.  SHE UNDERSTANDS.

## 2021-05-25 DIAGNOSIS — M25.50 ARTHRALGIA OF MULTIPLE JOINTS: ICD-10-CM

## 2021-05-25 RX ORDER — TRAMADOL HYDROCHLORIDE 100 MG/1
TABLET, EXTENDED RELEASE ORAL
Qty: 30 TABLET | Refills: 0 | Status: SHIPPED | OUTPATIENT
Start: 2021-05-25 | End: 2022-07-29 | Stop reason: SDUPTHER

## 2021-06-07 ENCOUNTER — OFFICE VISIT (OUTPATIENT)
Dept: OBSTETRICS AND GYNECOLOGY | Facility: CLINIC | Age: 61
End: 2021-06-07

## 2021-06-07 VITALS
DIASTOLIC BLOOD PRESSURE: 90 MMHG | HEIGHT: 63 IN | BODY MASS INDEX: 32.53 KG/M2 | SYSTOLIC BLOOD PRESSURE: 136 MMHG | WEIGHT: 183.6 LBS

## 2021-06-07 DIAGNOSIS — Z01.419 PAP SMEAR, LOW-RISK: Primary | ICD-10-CM

## 2021-06-07 DIAGNOSIS — Z98.890 S/P CONIZATION OF CERVIX: ICD-10-CM

## 2021-06-07 DIAGNOSIS — N87.1 HIGH GRADE SQUAMOUS INTRAEPITHELIAL LESION (HGSIL), GRADE 2 CIN, ON BIOPSY OF CERVIX: ICD-10-CM

## 2021-06-07 DIAGNOSIS — Z11.51 SPECIAL SCREENING EXAMINATION FOR HUMAN PAPILLOMAVIRUS (HPV): ICD-10-CM

## 2021-06-07 PROCEDURE — 99212 OFFICE O/P EST SF 10 MIN: CPT | Performed by: OBSTETRICS & GYNECOLOGY

## 2021-06-07 NOTE — PROGRESS NOTES
"      Cecily Broussard is a 61 y.o. patient who presents for follow up of   Chief Complaint   Patient presents with   • Gynecologic Exam     Repeat Pap        62 yo est pt here for first 6 month repeat pap. She had a CKC in 11/2020 that showed REAGAN 2 with negative margins and a negative ECC. She denies any  VB. She has her C scope scheduled for 7/2021. We discussed repeat CKC vs TLH for persistent dysplasia.       The following portions of the patient's history were reviewed and updated as appropriate: allergies, current medications and problem list.    Review of Systems   Constitutional: Negative for activity change.   Genitourinary: Negative for vaginal bleeding, vaginal discharge and vaginal pain.   Musculoskeletal: Negative for back pain.   All other systems reviewed and are negative.      /90   Ht 160 cm (62.99\")   Wt 83.3 kg (183 lb 9.6 oz)   LMP  (LMP Unknown)   Breastfeeding No   BMI 32.53 kg/m²     Physical Exam  Vitals and nursing note reviewed. Exam conducted with a chaperone present.   Constitutional:       Appearance: Normal appearance. She is well-developed.   HENT:      Head: Normocephalic and atraumatic.   Eyes:      General: No scleral icterus.     Conjunctiva/sclera: Conjunctivae normal.   Neck:      Thyroid: No thyromegaly.   Abdominal:      General: There is no distension.      Palpations: There is no mass.      Tenderness: There is no abdominal tenderness. There is no guarding or rebound.      Hernia: No hernia is present.   Genitourinary:     General: Normal vulva.      Vagina: No signs of injury and foreign body. No vaginal discharge, erythema, tenderness, bleeding, lesions or prolapsed vaginal walls.      Cervix: Normal.      Uterus: Normal.       Adnexa: Right adnexa normal and left adnexa normal.   Skin:     General: Skin is warm and dry.   Neurological:      Mental Status: She is alert and oriented to person, place, and time.   Psychiatric:         Mood and Affect: Mood normal.    "      Behavior: Behavior normal.         Thought Content: Thought content normal.         Judgment: Judgment normal.         A/P:  1. S/P CKC cervix 11/2020 with REAGAN 2, neg margins and neg ECC. First 6 month repeat pap today. Will call with results and f/u  2. MMG UTD 9/2020- B1. Schedule MMG 9/2021  3. Cscope scheduled for 7/2021.   4. RTO 12/2021 for annual and repeat pap.     Assessment/Plan   Diagnoses and all orders for this visit:    1. Pap smear, low-risk (Primary)  -     IgP, Aptima HPV    2. S/P conization of cervix    3. High grade squamous intraepithelial lesion (HGSIL), grade 2 REAGAN, on biopsy of cervix    4. Special screening examination for human papillomavirus (HPV)  -     IgP, Aptima HPV                 No follow-ups on file.      Queta Brandt MD    6/7/2021  11:00 EDT

## 2021-06-08 DIAGNOSIS — E78.2 MIXED HYPERLIPIDEMIA: ICD-10-CM

## 2021-06-08 DIAGNOSIS — E55.9 VITAMIN D DEFICIENCY: ICD-10-CM

## 2021-06-08 DIAGNOSIS — E03.9 ADULT HYPOTHYROIDISM: Primary | ICD-10-CM

## 2021-06-09 LAB
CYTOLOGIST CVX/VAG CYTO: ABNORMAL
CYTOLOGY CVX/VAG DOC CYTO: ABNORMAL
CYTOLOGY CVX/VAG DOC THIN PREP: ABNORMAL
DX ICD CODE: ABNORMAL
DX ICD CODE: ABNORMAL
HIV 1 & 2 AB SER-IMP: ABNORMAL
HPV I/H RISK 4 DNA CVX QL PROBE+SIG AMP: POSITIVE
OTHER STN SPEC: ABNORMAL
PATHOLOGIST CVX/VAG CYTO: ABNORMAL
RECOM F/U CVX/VAG CYTO: ABNORMAL
STAT OF ADQ CVX/VAG CYTO-IMP: ABNORMAL

## 2021-06-11 NOTE — PROGRESS NOTES
PIP= pap is abnormal and shows low grade changes and + HPV. She needs to make appt to discuss repeat CKC vs TLH. Ok to overbook

## 2021-06-21 ENCOUNTER — OFFICE VISIT (OUTPATIENT)
Dept: OBSTETRICS AND GYNECOLOGY | Facility: CLINIC | Age: 61
End: 2021-06-21

## 2021-06-21 VITALS
WEIGHT: 184.2 LBS | DIASTOLIC BLOOD PRESSURE: 80 MMHG | HEIGHT: 63 IN | SYSTOLIC BLOOD PRESSURE: 128 MMHG | BODY MASS INDEX: 32.64 KG/M2

## 2021-06-21 DIAGNOSIS — Z09 FOLLOW UP: ICD-10-CM

## 2021-06-21 DIAGNOSIS — Z01.818 PREOPERATIVE EXAM FOR GYNECOLOGIC SURGERY: Primary | ICD-10-CM

## 2021-06-21 DIAGNOSIS — Z87.42 H/O ABNORMAL CERVICAL PAPANICOLAOU SMEAR: ICD-10-CM

## 2021-06-21 DIAGNOSIS — N87.1 DYSPLASIA OF CERVIX, HIGH GRADE CIN 2: ICD-10-CM

## 2021-06-21 PROCEDURE — 99213 OFFICE O/P EST LOW 20 MIN: CPT | Performed by: OBSTETRICS & GYNECOLOGY

## 2021-06-21 PROCEDURE — 81002 URINALYSIS NONAUTO W/O SCOPE: CPT | Performed by: OBSTETRICS & GYNECOLOGY

## 2021-06-21 NOTE — PROGRESS NOTES
"      Cecily Broussard is a 61 y.o. patient who presents for follow up of   Chief Complaint   Patient presents with   • Follow-up     DISCUSS SURGERY       She had an LGSIL Pap in 2/2016 and had colpo biopsy with REAGAN-1.  She had no additional follow-up until 2018. .  She then saw me for the first time on 10/2018 and had an ASCUS positive HPV Pap and normal colposcopic biopsies.  She then had lapsed care again until 9/20/2020.  This was a normal Pap with a positive high risk HPV.  she had a CKC in 11/2020 that was REAGAN 2 with negative margins and a negative ECC. Her first 6 month repeat pap in 6/2021 was LGSIL + HPV. We discussed that she has had severe dysplasia with negative margins on CKC but is still having abnormal pap smears. We discussed further treatment options including repeat pap/ECC q 3 months, repeat CKC and TLH and she would like to proceed with TLH for recurrent abnormal pap smears. We discussed BSO vs ovarian conservation and she desires BSO. She denies any  VB.      The following portions of the patient's history were reviewed and updated as appropriate: allergies, current medications and problem list.    Review of Systems   Constitutional: Negative for activity change.   Genitourinary: Negative for vaginal bleeding, vaginal discharge and vaginal pain.   Musculoskeletal: Negative for back pain.   All other systems reviewed and are negative.      /80   Ht 160 cm (62.99\")   Wt 83.6 kg (184 lb 3.2 oz)   LMP  (LMP Unknown)   Breastfeeding No   BMI 32.64 kg/m²     Physical Exam  Vitals and nursing note reviewed.   Constitutional:       Appearance: Normal appearance. She is well-developed.   HENT:      Head: Normocephalic and atraumatic.   Eyes:      General: No scleral icterus.     Conjunctiva/sclera: Conjunctivae normal.   Neck:      Thyroid: No thyromegaly.   Cardiovascular:      Rate and Rhythm: Normal rate and regular rhythm.   Pulmonary:      Effort: Pulmonary effort is normal.      Breath " sounds: Normal breath sounds.   Abdominal:      General: There is no distension.      Palpations: Abdomen is soft. There is no mass.      Tenderness: There is no abdominal tenderness. There is no guarding or rebound.      Hernia: No hernia is present.   Skin:     General: Skin is warm and dry.   Neurological:      Mental Status: She is alert and oriented to person, place, and time.   Psychiatric:         Mood and Affect: Mood normal.         Behavior: Behavior normal.         Thought Content: Thought content normal.         Judgment: Judgment normal.         A/P:  1. H/O CKC with REAGAN 2- first 6 month repeat pap is LGSIL + HPV despite negative margins on CKC. She desires to proceed with TLH/BSO, possible MARCELO/BSO. Risks, benefits and alternatives of the procedure were discussed, including , but not limited to: infection, bleeding, transfusion, injury to adjacent structures, laparotomy, possible nondiagnostic findings, possible findings of unexpected malignancy, reoperation, recurrent symptoms, thromboembolic events, anaeasthetic complications and death. We discussed worsening of menopausal symptoms after BSO as well as the need to follow pap smears of the vaginal cuff postoperatively.  Pre/intra/postop course was reviewed and all questions answered. Patient was encouraged to call for any additional questions she might have in the future.   2. Check TVUS.      Assessment/Plan   Diagnoses and all orders for this visit:    1. Follow up (Primary)  -     POC Urinalysis Dipstick    2. H/O abnormal cervical Papanicolaou smear    3. Dysplasia of cervix, high grade REAGAN 2    4. Preoperative exam for gynecologic surgery                 No follow-ups on file.      Queta Brandt MD    6/21/2021  11:10 EDT

## 2021-06-29 ENCOUNTER — TELEPHONE (OUTPATIENT)
Dept: GASTROENTEROLOGY | Facility: CLINIC | Age: 61
End: 2021-06-29

## 2021-07-01 PROBLEM — N87.1 DYSPLASIA OF CERVIX, HIGH GRADE CIN 2: Status: ACTIVE | Noted: 2021-07-01

## 2021-07-01 PROBLEM — Z87.42 H/O ABNORMAL CERVICAL PAPANICOLAOU SMEAR: Status: ACTIVE | Noted: 2021-07-01

## 2021-07-08 ENCOUNTER — TELEPHONE (OUTPATIENT)
Dept: GASTROENTEROLOGY | Facility: CLINIC | Age: 61
End: 2021-07-08

## 2021-07-08 NOTE — TELEPHONE ENCOUNTER
Was not aware she canceled.  Will call patient.    Called patient and left message on her voice mail.

## 2021-07-08 NOTE — TELEPHONE ENCOUNTER
Buck Bauer,  I left you a vm but wanted to let you know I got a call from pre-op that BERHANE Broussard canceled her appointment last week. I went ahead and removed her from the grid. Do you know if she is rescheduling?

## 2021-07-08 NOTE — TELEPHONE ENCOUNTER
Return call from patient.  She states called last week and canceled her procedure.  She spoke with gentlemen.      Apologize for not received message.  She does not want to reschedule at this time.  Insurance issue.

## 2021-07-12 ENCOUNTER — TELEPHONE (OUTPATIENT)
Dept: OBSTETRICS AND GYNECOLOGY | Facility: CLINIC | Age: 61
End: 2021-07-12

## 2021-08-18 ENCOUNTER — TRANSCRIBE ORDERS (OUTPATIENT)
Dept: ADMINISTRATIVE | Facility: HOSPITAL | Age: 61
End: 2021-08-18

## 2021-08-18 DIAGNOSIS — U07.1 COVID-19: Primary | ICD-10-CM

## 2021-09-23 ENCOUNTER — TELEPHONE (OUTPATIENT)
Dept: INTERNAL MEDICINE | Facility: CLINIC | Age: 61
End: 2021-09-23

## 2021-09-23 NOTE — TELEPHONE ENCOUNTER
Caller: Cecily Broussard    Relationship to patient: Self    Best call back number: [phone]    Patient is needing: PATIENT WOULD LIKE TO KNOW IF ESTHER HOLLEY WOULD BE ABLE TO PLACE LABS IN FOR HER TO HAVE HER THYROID CHECKED

## 2021-09-24 DIAGNOSIS — E03.9 ADULT HYPOTHYROIDISM: Primary | ICD-10-CM

## 2021-09-24 NOTE — TELEPHONE ENCOUNTER
LVM with pt that her lab orders are in the chart and she is able to come in for labs at any time.

## 2021-09-27 ENCOUNTER — APPOINTMENT (OUTPATIENT)
Dept: PREADMISSION TESTING | Facility: HOSPITAL | Age: 61
End: 2021-09-27

## 2021-10-02 ENCOUNTER — APPOINTMENT (OUTPATIENT)
Dept: LAB | Facility: HOSPITAL | Age: 61
End: 2021-10-02

## 2021-10-03 DIAGNOSIS — N95.9 MENOPAUSAL DISORDER: ICD-10-CM

## 2021-10-03 DIAGNOSIS — M25.50 ARTHRALGIA OF MULTIPLE JOINTS: ICD-10-CM

## 2021-10-04 RX ORDER — VENLAFAXINE HYDROCHLORIDE 75 MG/1
CAPSULE, EXTENDED RELEASE ORAL
Qty: 90 CAPSULE | Refills: 1 | Status: SHIPPED | OUTPATIENT
Start: 2021-10-04 | End: 2022-06-27

## 2021-10-04 RX ORDER — CELECOXIB 200 MG/1
CAPSULE ORAL
Qty: 90 CAPSULE | Refills: 1 | Status: SHIPPED | OUTPATIENT
Start: 2021-10-04 | End: 2022-06-27

## 2021-10-04 NOTE — TELEPHONE ENCOUNTER
Rx Refill Note  Requested Prescriptions     Pending Prescriptions Disp Refills    celecoxib (CeleBREX) 200 MG capsule [Pharmacy Med Name: CELECOXIB 200 MG CAPSULE] 90 capsule 1     Sig: TAKE ONE CAPSULE BY MOUTH DAILY    venlafaxine XR (EFFEXOR-XR) 75 MG 24 hr capsule [Pharmacy Med Name: VENLAFAXINE HCL ER 75 MG CAP] 90 capsule 1     Sig: TAKE ONE CAPSULE BY MOUTH DAILY      Last office visit with prescribing clinician: 12/16/2020      Next office visit with prescribing clinician: 2/14/2022            Candi Monte MA  10/04/21, 08:43 EDT

## 2021-10-14 ENCOUNTER — PRE-ADMISSION TESTING (OUTPATIENT)
Dept: PREADMISSION TESTING | Facility: HOSPITAL | Age: 61
End: 2021-10-14

## 2021-10-14 VITALS
HEIGHT: 63 IN | SYSTOLIC BLOOD PRESSURE: 151 MMHG | HEART RATE: 87 BPM | OXYGEN SATURATION: 95 % | DIASTOLIC BLOOD PRESSURE: 79 MMHG | BODY MASS INDEX: 32.8 KG/M2 | RESPIRATION RATE: 18 BRPM | WEIGHT: 185.1 LBS

## 2021-10-14 DIAGNOSIS — Z01.818 PREOPERATIVE EXAM FOR GYNECOLOGIC SURGERY: ICD-10-CM

## 2021-10-14 LAB
ABO GROUP BLD: NORMAL
ABO GROUP BLD: NORMAL
ANION GAP SERPL CALCULATED.3IONS-SCNC: 7.3 MMOL/L (ref 5–15)
BASOPHILS # BLD AUTO: 0.02 10*3/MM3 (ref 0–0.2)
BASOPHILS NFR BLD AUTO: 0.5 % (ref 0–1.5)
BLD GP AB SCN SERPL QL: NEGATIVE
BUN SERPL-MCNC: 14 MG/DL (ref 8–23)
BUN/CREAT SERPL: 18.9 (ref 7–25)
CALCIUM SPEC-SCNC: 8.9 MG/DL (ref 8.6–10.5)
CHLORIDE SERPL-SCNC: 102 MMOL/L (ref 98–107)
CO2 SERPL-SCNC: 30.7 MMOL/L (ref 22–29)
CREAT SERPL-MCNC: 0.74 MG/DL (ref 0.57–1)
DEPRECATED RDW RBC AUTO: 40.3 FL (ref 37–54)
EOSINOPHIL # BLD AUTO: 0.08 10*3/MM3 (ref 0–0.4)
EOSINOPHIL NFR BLD AUTO: 2 % (ref 0.3–6.2)
ERYTHROCYTE [DISTWIDTH] IN BLOOD BY AUTOMATED COUNT: 13 % (ref 12.3–15.4)
GFR SERPL CREATININE-BSD FRML MDRD: 80 ML/MIN/1.73
GLUCOSE SERPL-MCNC: 104 MG/DL (ref 65–99)
HCT VFR BLD AUTO: 44.4 % (ref 34–46.6)
HGB BLD-MCNC: 14.3 G/DL (ref 12–15.9)
IMM GRANULOCYTES # BLD AUTO: 0 10*3/MM3 (ref 0–0.05)
IMM GRANULOCYTES NFR BLD AUTO: 0 % (ref 0–0.5)
LYMPHOCYTES # BLD AUTO: 1.59 10*3/MM3 (ref 0.7–3.1)
LYMPHOCYTES NFR BLD AUTO: 39.2 % (ref 19.6–45.3)
MCH RBC QN AUTO: 26.8 PG (ref 26.6–33)
MCHC RBC AUTO-ENTMCNC: 32.2 G/DL (ref 31.5–35.7)
MCV RBC AUTO: 83.3 FL (ref 79–97)
MONOCYTES # BLD AUTO: 0.47 10*3/MM3 (ref 0.1–0.9)
MONOCYTES NFR BLD AUTO: 11.6 % (ref 5–12)
NEUTROPHILS NFR BLD AUTO: 1.9 10*3/MM3 (ref 1.7–7)
NEUTROPHILS NFR BLD AUTO: 46.7 % (ref 42.7–76)
PLATELET # BLD AUTO: 254 10*3/MM3 (ref 140–450)
PMV BLD AUTO: 9.8 FL (ref 6–12)
POTASSIUM SERPL-SCNC: 4.1 MMOL/L (ref 3.5–5.2)
RBC # BLD AUTO: 5.33 10*6/MM3 (ref 3.77–5.28)
RH BLD: POSITIVE
RH BLD: POSITIVE
SODIUM SERPL-SCNC: 140 MMOL/L (ref 136–145)
T&S EXPIRATION DATE: NORMAL
WBC # BLD AUTO: 4.06 10*3/MM3 (ref 3.4–10.8)

## 2021-10-14 PROCEDURE — 86900 BLOOD TYPING SEROLOGIC ABO: CPT | Performed by: OBSTETRICS & GYNECOLOGY

## 2021-10-14 PROCEDURE — 80048 BASIC METABOLIC PNL TOTAL CA: CPT | Performed by: NURSE PRACTITIONER

## 2021-10-14 PROCEDURE — 86850 RBC ANTIBODY SCREEN: CPT | Performed by: OBSTETRICS & GYNECOLOGY

## 2021-10-14 PROCEDURE — 85025 COMPLETE CBC W/AUTO DIFF WBC: CPT | Performed by: NURSE PRACTITIONER

## 2021-10-14 PROCEDURE — 84443 ASSAY THYROID STIM HORMONE: CPT | Performed by: NURSE PRACTITIONER

## 2021-10-14 PROCEDURE — 86900 BLOOD TYPING SEROLOGIC ABO: CPT

## 2021-10-14 PROCEDURE — 84439 ASSAY OF FREE THYROXINE: CPT | Performed by: NURSE PRACTITIONER

## 2021-10-14 PROCEDURE — 86901 BLOOD TYPING SEROLOGIC RH(D): CPT

## 2021-10-14 PROCEDURE — 36415 COLL VENOUS BLD VENIPUNCTURE: CPT

## 2021-10-14 PROCEDURE — 86901 BLOOD TYPING SEROLOGIC RH(D): CPT | Performed by: OBSTETRICS & GYNECOLOGY

## 2021-10-14 NOTE — DISCHARGE INSTRUCTIONS
PRE-ADMISSION TESTING INSTRUCTIONS FOR ADULTS    Take these medications the morning of surgery with a small sip of water: Zetia, Tramadol, synthroid      No aspirin, advil, aleve, ibuprofen, naproxen, diet pills, decongestants, or herbal/vitamins for a week prior to surgery.    General Instructions:    • Do not eat solid food after midnight the night before surgery.  No gum, mints, or hard candy after midnight the night before surgery.  • You may drink clear liquids the day of surgery up until 2 hours before your arrival time.  • Clear liquids are liquids you can see through. Nothing RED in color.    Plain water    Sports drinks  Sodas     Gelatin (Jell-O)  Fruit juices without pulp such as white grape juice and apple juice  Popsicles that contain no fruit or yogurt  Tea or coffee (no cream or milk added)    • It is beneficial for you to have a clear drink that contains carbohydrates just before you leave your house and before your fasting time begins.  We suggest a 20 ounce bottle of Gatorade or Powerade for non-diabetic patients or a 20 ounce bottle of G2 or Powerade Zero for diabetic patients.     • Patients who avoid smoking, chewing tobacco and alcohol for 4 weeks prior to surgery have a reduced risk of post-operative complications.  If at all possible, quit smoking as many days before surgery as you can.    • Do not smoke, use chewing tobacco or drink alcohol the day of surgery    • Bring your C-PAP/ BI-PAP machine if you use one.  • Wear clean comfortable clothes and socks.  • Do not wear contact lenses, lotion, deodorant, or make-up.  Bring a case for your glasses if applicable. You may brush your teeth the morning of surgery.  • You may wear dentures/partials, do not put adhesive/glue on them.    • Leave all other jewelry and valuables at home.      Preventing a Surgical Site Infection:    • Shower the night before and on the morning of surgery using the chlorhexidine soap you were given.  Use a clean  washcloth with the soap.  Place clean sheets on your bed after showering the night before surgery. Do not use the CHG soap on your hair, face, or private areas. Wash your body gently for five (5) minutes. Do not scrub your skin.  Dry with a clean towel and dress in clean clothing.    • Do not shave the surgical area for 10 days-2 weeks prior to surgery  because the razor can irritate skin and make it easier to develop an infection.  • Make sure you, your family, and all healthcare providers clean their hands with soap and water or an alcohol based hand  before caring for you or your wound.      Day of surgery:    Your surgeon’s office will advise you of your arrival time for the day of surgery.    Upon arrival, a Pre-op nurse and Anesthesia provider will review your health history, obtain vital signs, and answer questions you may have.  The only belongings needed at this time will be your home medications and if applicable your C-PAP/BI-PAP machine.  If you are staying overnight your family can leave the rest of your belongings in the car and bring them to your room later.  A Pre-op nurse will start an IV and you may receive medication in preparation for surgery, including something to help you relax.  Your family will be able to see you in the Pre-op area.  While you are in surgery your family should notify the waiting room  if they leave the waiting room area and provide a contact phone number.    IF you have any questions, you can call the Pre-Admission Department at [phone] or your surgeon's office.  Notify your surgeon if  you become sick, have a fever, productive cough, or cannot be here the day of surgery    Please be aware that surgery does come with discomfort.  We want to make every effort to control your discomfort so please discuss any uncontrolled symptoms with your nurse.   Your doctor will most likely have prescribed pain medications.      If you are going home after  surgery, you will receive individualized written care instructions before being discharged.  A responsible adult (over the age of 18) must drive you to and from the hospital on the day of your surgery and stay with you for 24 hours after anesthesia.    If you are staying overnight following surgery, you will be transported to your hospital room following the recovery period.  [facility] has all private rooms.    You may receive a survey regarding the care you received. Your feedback is very important and will be used to collect the necessary data to help us to continue to provide excellent care.     Deductibles and co-payments are collected on the day of service. Please be prepared to pay the required co-pay, deductible or deposit on the day of service as defined by your plan.

## 2021-10-16 ENCOUNTER — LAB (OUTPATIENT)
Dept: LAB | Facility: HOSPITAL | Age: 61
End: 2021-10-16

## 2021-10-16 DIAGNOSIS — Z01.818 PREOPERATIVE EXAM FOR GYNECOLOGIC SURGERY: ICD-10-CM

## 2021-10-16 LAB — SARS-COV-2 RNA PNL SPEC NAA+PROBE: NOT DETECTED

## 2021-10-16 PROCEDURE — 87635 SARS-COV-2 COVID-19 AMP PRB: CPT | Performed by: OBSTETRICS & GYNECOLOGY

## 2021-10-16 PROCEDURE — C9803 HOPD COVID-19 SPEC COLLECT: HCPCS | Performed by: OBSTETRICS & GYNECOLOGY

## 2021-10-18 ENCOUNTER — TELEPHONE (OUTPATIENT)
Dept: GASTROENTEROLOGY | Facility: CLINIC | Age: 61
End: 2021-10-18

## 2021-10-18 ENCOUNTER — ANESTHESIA EVENT (OUTPATIENT)
Dept: PERIOP | Facility: HOSPITAL | Age: 61
End: 2021-10-18

## 2021-10-18 ENCOUNTER — TRANSCRIBE ORDERS (OUTPATIENT)
Dept: ADMINISTRATIVE | Facility: HOSPITAL | Age: 61
End: 2021-10-18

## 2021-10-18 DIAGNOSIS — Z92.89 HISTORY OF MAMMOGRAPHY, SCREENING: Primary | ICD-10-CM

## 2021-10-18 NOTE — H&P
Patient Care Team:  Lyubov Whelan APRN as PCP - Jinny Govea DO as Consulting Physician (Obstetrics and Gynecology)  Paul Laureano MD as Consulting Physician (Rheumatology)    Chief complaint : severe cervical dysplasia       HPI:         62 yo est pt here for TLH/BSO. She had an LGSIL Pap in 2016 and had colpo biopsy with REAGAN-1.  She had no additional follow-up until 2018. .  She then saw me for the first time on 10/2018 and had an ASCUS positive HPV Pap and normal colposcopic biopsies.  She then had lapsed care again until 2020.  This was a normal Pap with a positive high risk HPV.  she had a CKC in 2020 that was REAGAN 2 with negative margins and a negative ECC. Her first 6 month repeat pap in 2021 was LGSIL + HPV. We discussed that she has had severe dysplasia with negative margins on CKC but is still having abnormal pap smears. We discussed further treatment options including repeat pap/ECC q 3 months, repeat CKC and TLH and she would like to proceed with TLH for recurrent abnormal pap smears. We discussed BSO vs ovarian conservation and she desires BSO. She denies any  VB.      PMH:   Past Medical History:   Diagnosis Date   • Abnormal Pap smear of cervix 2016   • Arthritis    • Cervical dysplasia 2016    REAGAN 1on bx, CKC with REAGAN 2, neg margins   • Depression    • Disease of thyroid gland    • Hyperlipidemia    • Menopause          PSH:   Past Surgical History:   Procedure Laterality Date   • ABDOMINOPLASTY     • CERVICAL CONIZATION N/A 2020    Procedure: CERVICAL CONIZATION;  Surgeon: Queta Brandt MD;  Location: [facility];  Service: Obstetrics/Gynecology;  Laterality: N/A;   •  SECTION      x 1   • COLONOSCOPY     • TUBAL ABDOMINAL LIGATION         SoHx:   Social History     Socioeconomic History   • Marital status: Single   Tobacco Use   • Smoking status: Former Smoker     Years: 2.00     Types: Cigarettes     Quit date:      Years since  quittin.8   • Smokeless tobacco: Never Used   Vaping Use   • Vaping Use: Never used   Substance and Sexual Activity   • Alcohol use: Yes     Comment: twice monthly   • Drug use: No   • Sexual activity: Yes     Partners: Male     Birth control/protection: Post-menopausal, Surgical     Comment: BTL       FHx:   Family History   Problem Relation Age of Onset   • Breast cancer Mother 69   • Breast cancer Maternal Aunt    • Kidney cancer Sister    • Ovarian cancer Neg Hx    • Colon cancer Neg Hx    • Deep vein thrombosis Neg Hx    • Pulmonary embolism Neg Hx    • Malig Hyperthermia Neg Hx        OB History          2    Para   2    Term   2            AB        Living   2         SAB        TAB        Ectopic        Molar        Multiple        Live Births               Obstetric Comments   1   1 C/S                Menarche:13  Menopause:50  HRT:none  Current contraception: post menopausal status and tubal ligation  History of abnormal Pap smear: yes -  s/p CKC  History of abnormal mammogram: no  Family history of uterine, colon or ovarian cancer: no  Family history of breast cancer: yes - mom age 69, M aunt ? age  STD's: none  Last pap smear: 2021- LGSIL: + HPV  BRENT; none      Allergies: Estrogens and Statins    Medications:   No current facility-administered medications on file prior to encounter.     Current Outpatient Medications on File Prior to Encounter   Medication Sig Dispense Refill   • Cholecalciferol (VITAMIN D) 2000 UNITS capsule Take 1 capsule by mouth Daily. 30 capsule 11   • ezetimibe (ZETIA) 10 MG tablet Take 1 tablet by mouth Daily. 90 tablet 2   • levothyroxine (SYNTHROID, LEVOTHROID) 100 MCG tablet Take 1 tablet by mouth Daily. 90 tablet 3   • traMADol ER (ULTRAM-ER) 100 MG 24 hr tablet TAKE ONE TABLET BY MOUTH DAILY 30 tablet 0       LMP  (LMP Unknown)     Review of Systems   Constitutional: Negative for activity change.   Genitourinary: Negative for vaginal bleeding, vaginal  discharge and vaginal pain.   Musculoskeletal: Negative for back pain.   All other systems reviewed and are negative.      Physical Exam  Vitals and nursing note reviewed.   Constitutional:       Appearance: Normal appearance. She is well-developed.   HENT:      Head: Normocephalic and atraumatic.   Eyes:      General: No scleral icterus.     Conjunctiva/sclera: Conjunctivae normal.   Neck:      Thyroid: No thyromegaly.   Cardiovascular:      Rate and Rhythm: Normal rate and regular rhythm.   Pulmonary:      Effort: Pulmonary effort is normal.      Breath sounds: Normal breath sounds.   Abdominal:      General: There is no distension.      Palpations: Abdomen is soft. There is no mass.      Tenderness: There is no abdominal tenderness. There is no guarding or rebound.      Hernia: No hernia is present.   Skin:     General: Skin is warm and dry.   Neurological:      Mental Status: She is alert and oriented to person, place, and time.   Psychiatric:         Mood and Affect: Mood normal.         Behavior: Behavior normal.         Thought Content: Thought content normal.         Judgment: Judgment normal.         Debilities/Disabilities Identified: None    Labs:     Lab Results (last 7 days)     ** No results found for the last 168 hours. **          Assessment/Plan:     1. H/O CKC with REAGAN 2- first 6 month repeat pap is LGSIL + HPV despite negative margins on CKC. She desires to proceed with TLH/BSO, possible MARCELO/BSO. Risks, benefits and alternatives of the procedure were discussed, including , but not limited to: infection, bleeding, transfusion, injury to adjacent structures, laparotomy, possible nondiagnostic findings, possible findings of unexpected malignancy, reoperation, recurrent symptoms, thromboembolic events, anaeasthetic complications and death. We discussed worsening of menopausal symptoms after BSO as well as the need to follow pap smears of the vaginal cuff postoperatively.  Pre/intra/postop course was  reviewed and all questions answered    I discussed the patients findings and my recommendations with patient.     Queta Brandt MD  10/18/21  12:27 EDT

## 2021-10-19 ENCOUNTER — ANESTHESIA (OUTPATIENT)
Dept: PERIOP | Facility: HOSPITAL | Age: 61
End: 2021-10-19

## 2021-10-19 ENCOUNTER — HOSPITAL ENCOUNTER (OUTPATIENT)
Facility: HOSPITAL | Age: 61
Discharge: HOME | End: 2021-10-20
Attending: OBSTETRICS & GYNECOLOGY | Admitting: OBSTETRICS & GYNECOLOGY

## 2021-10-19 DIAGNOSIS — Z87.42 H/O ABNORMAL CERVICAL PAPANICOLAOU SMEAR: ICD-10-CM

## 2021-10-19 DIAGNOSIS — Z98.891 S/P CESAREAN SECTION: Primary | ICD-10-CM

## 2021-10-19 DIAGNOSIS — N87.1 DYSPLASIA OF CERVIX, HIGH GRADE CIN 2: ICD-10-CM

## 2021-10-19 PROCEDURE — 25010000003 MORPHINE PER 10 MG: Performed by: NURSE ANESTHETIST, CERTIFIED REGISTERED

## 2021-10-19 PROCEDURE — 63710000001 DIPHENHYDRAMINE PER 50 MG: Performed by: NURSE ANESTHETIST, CERTIFIED REGISTERED

## 2021-10-19 PROCEDURE — 25010000002 NEOSTIGMINE 10 MG/10ML SOLUTION: Performed by: NURSE ANESTHETIST, CERTIFIED REGISTERED

## 2021-10-19 PROCEDURE — 25010000002 DEXAMETHASONE PER 1 MG: Performed by: NURSE ANESTHETIST, CERTIFIED REGISTERED

## 2021-10-19 PROCEDURE — 25010000002 MIDAZOLAM PER 1MG: Performed by: NURSE ANESTHETIST, CERTIFIED REGISTERED

## 2021-10-19 PROCEDURE — 58571 TLH W/T/O 250 G OR LESS: CPT | Performed by: SPECIALIST/TECHNOLOGIST, OTHER

## 2021-10-19 PROCEDURE — 88309 TISSUE EXAM BY PATHOLOGIST: CPT | Performed by: OBSTETRICS & GYNECOLOGY

## 2021-10-19 PROCEDURE — 25010000003 CEFAZOLIN SODIUM-DEXTROSE 2-3 GM-%(50ML) RECONSTITUTED SOLUTION: Performed by: OBSTETRICS & GYNECOLOGY

## 2021-10-19 PROCEDURE — 25010000002 PHENYLEPHRINE 10 MG/ML SOLUTION: Performed by: NURSE ANESTHETIST, CERTIFIED REGISTERED

## 2021-10-19 PROCEDURE — 25010000002 ONDANSETRON PER 1 MG: Performed by: NURSE ANESTHETIST, CERTIFIED REGISTERED

## 2021-10-19 PROCEDURE — 58571 TLH W/T/O 250 G OR LESS: CPT | Performed by: OBSTETRICS & GYNECOLOGY

## 2021-10-19 PROCEDURE — 25010000002 PROPOFOL 10 MG/ML EMULSION: Performed by: NURSE ANESTHETIST, CERTIFIED REGISTERED

## 2021-10-19 PROCEDURE — 25010000002 KETOROLAC TROMETHAMINE PER 15 MG: Performed by: NURSE ANESTHETIST, CERTIFIED REGISTERED

## 2021-10-19 PROCEDURE — 25010000002 FENTANYL CITRATE (PF) 50 MCG/ML SOLUTION: Performed by: NURSE ANESTHETIST, CERTIFIED REGISTERED

## 2021-10-19 PROCEDURE — G0378 HOSPITAL OBSERVATION PER HR: HCPCS

## 2021-10-19 PROCEDURE — 25010000002 SUCCINYLCHOLINE PER 20 MG: Performed by: NURSE ANESTHETIST, CERTIFIED REGISTERED

## 2021-10-19 RX ORDER — KETAMINE HYDROCHLORIDE 10 MG/ML
INJECTION INTRAMUSCULAR; INTRAVENOUS AS NEEDED
Status: DISCONTINUED | OUTPATIENT
Start: 2021-10-19 | End: 2021-10-19 | Stop reason: SURG

## 2021-10-19 RX ORDER — SODIUM CHLORIDE, SODIUM LACTATE, POTASSIUM CHLORIDE, CALCIUM CHLORIDE 600; 310; 30; 20 MG/100ML; MG/100ML; MG/100ML; MG/100ML
125 INJECTION, SOLUTION INTRAVENOUS CONTINUOUS
Status: DISCONTINUED | OUTPATIENT
Start: 2021-10-19 | End: 2021-10-20 | Stop reason: HOSPADM

## 2021-10-19 RX ORDER — KETOROLAC TROMETHAMINE 30 MG/ML
INJECTION, SOLUTION INTRAMUSCULAR; INTRAVENOUS AS NEEDED
Status: DISCONTINUED | OUTPATIENT
Start: 2021-10-19 | End: 2021-10-19 | Stop reason: SURG

## 2021-10-19 RX ORDER — LIDOCAINE HYDROCHLORIDE 20 MG/ML
INJECTION, SOLUTION INFILTRATION; PERINEURAL AS NEEDED
Status: DISCONTINUED | OUTPATIENT
Start: 2021-10-19 | End: 2021-10-19 | Stop reason: SURG

## 2021-10-19 RX ORDER — HYDROMORPHONE HCL 110MG/55ML
0.5 PATIENT CONTROLLED ANALGESIA SYRINGE INTRAVENOUS
Status: DISCONTINUED | OUTPATIENT
Start: 2021-10-19 | End: 2021-10-19 | Stop reason: CLARIF

## 2021-10-19 RX ORDER — ACETAMINOPHEN 325 MG/1
650 TABLET ORAL EVERY 4 HOURS PRN
Status: ACTIVE | OUTPATIENT
Start: 2021-10-19 | End: 2021-10-20

## 2021-10-19 RX ORDER — PROPOFOL 10 MG/ML
VIAL (ML) INTRAVENOUS AS NEEDED
Status: DISCONTINUED | OUTPATIENT
Start: 2021-10-19 | End: 2021-10-19 | Stop reason: SURG

## 2021-10-19 RX ORDER — DIPHENHYDRAMINE HCL 25 MG
25 CAPSULE ORAL EVERY 4 HOURS PRN
Status: DISCONTINUED | OUTPATIENT
Start: 2021-10-19 | End: 2021-10-20 | Stop reason: HOSPADM

## 2021-10-19 RX ORDER — ONDANSETRON 2 MG/ML
4 INJECTION INTRAMUSCULAR; INTRAVENOUS ONCE AS NEEDED
Status: COMPLETED | OUTPATIENT
Start: 2021-10-19 | End: 2021-10-19

## 2021-10-19 RX ORDER — NEOSTIGMINE METHYLSULFATE 1 MG/ML
INJECTION, SOLUTION INTRAVENOUS AS NEEDED
Status: DISCONTINUED | OUTPATIENT
Start: 2021-10-19 | End: 2021-10-19 | Stop reason: SURG

## 2021-10-19 RX ORDER — CEFAZOLIN SODIUM 2 G/50ML
2 SOLUTION INTRAVENOUS ONCE
Status: COMPLETED | OUTPATIENT
Start: 2021-10-19 | End: 2021-10-19

## 2021-10-19 RX ORDER — ROCURONIUM BROMIDE 10 MG/ML
INJECTION, SOLUTION INTRAVENOUS AS NEEDED
Status: DISCONTINUED | OUTPATIENT
Start: 2021-10-19 | End: 2021-10-19 | Stop reason: SURG

## 2021-10-19 RX ORDER — MIDAZOLAM HYDROCHLORIDE 2 MG/2ML
1 INJECTION, SOLUTION INTRAMUSCULAR; INTRAVENOUS
Status: COMPLETED | OUTPATIENT
Start: 2021-10-19 | End: 2021-10-19

## 2021-10-19 RX ORDER — SIMETHICONE 80 MG
80 TABLET,CHEWABLE ORAL 4 TIMES DAILY PRN
Status: DISCONTINUED | OUTPATIENT
Start: 2021-10-19 | End: 2021-10-20 | Stop reason: HOSPADM

## 2021-10-19 RX ORDER — ONDANSETRON 2 MG/ML
4 INJECTION INTRAMUSCULAR; INTRAVENOUS EVERY 6 HOURS PRN
Status: DISCONTINUED | OUTPATIENT
Start: 2021-10-19 | End: 2021-10-20 | Stop reason: HOSPADM

## 2021-10-19 RX ORDER — SODIUM CHLORIDE 0.9 % (FLUSH) 0.9 %
10 SYRINGE (ML) INJECTION AS NEEDED
Status: DISCONTINUED | OUTPATIENT
Start: 2021-10-19 | End: 2021-10-19 | Stop reason: HOSPADM

## 2021-10-19 RX ORDER — NALOXONE HCL 0.4 MG/ML
0.4 VIAL (ML) INJECTION
Status: ACTIVE | OUTPATIENT
Start: 2021-10-19 | End: 2021-10-20

## 2021-10-19 RX ORDER — SODIUM CHLORIDE, SODIUM LACTATE, POTASSIUM CHLORIDE, CALCIUM CHLORIDE 600; 310; 30; 20 MG/100ML; MG/100ML; MG/100ML; MG/100ML
9 INJECTION, SOLUTION INTRAVENOUS CONTINUOUS
Status: DISCONTINUED | OUTPATIENT
Start: 2021-10-19 | End: 2021-10-20 | Stop reason: HOSPADM

## 2021-10-19 RX ORDER — MORPHINE SULFATE 0.5 MG/ML
INJECTION, SOLUTION EPIDURAL; INTRATHECAL; INTRAVENOUS AS NEEDED
Status: DISCONTINUED | OUTPATIENT
Start: 2021-10-19 | End: 2021-10-19 | Stop reason: SURG

## 2021-10-19 RX ORDER — LIDOCAINE HYDROCHLORIDE AND EPINEPHRINE 10; 10 MG/ML; UG/ML
INJECTION, SOLUTION INFILTRATION; PERINEURAL AS NEEDED
Status: DISCONTINUED | OUTPATIENT
Start: 2021-10-19 | End: 2021-10-19 | Stop reason: HOSPADM

## 2021-10-19 RX ORDER — MORPHINE SULFATE 0.5 MG/ML
INJECTION, SOLUTION EPIDURAL; INTRATHECAL; INTRAVENOUS AS NEEDED
Status: DISCONTINUED | OUTPATIENT
Start: 2021-10-19 | End: 2021-10-19

## 2021-10-19 RX ORDER — DEXAMETHASONE SODIUM PHOSPHATE 4 MG/ML
4 INJECTION, SOLUTION INTRA-ARTICULAR; INTRALESIONAL; INTRAMUSCULAR; INTRAVENOUS; SOFT TISSUE ONCE AS NEEDED
Status: COMPLETED | OUTPATIENT
Start: 2021-10-19 | End: 2021-10-19

## 2021-10-19 RX ORDER — SCOLOPAMINE TRANSDERMAL SYSTEM 1 MG/1
1 PATCH, EXTENDED RELEASE TRANSDERMAL CONTINUOUS
Status: DISPENSED | OUTPATIENT
Start: 2021-10-19 | End: 2021-10-20

## 2021-10-19 RX ORDER — LIDOCAINE HYDROCHLORIDE 10 MG/ML
0.5 INJECTION, SOLUTION EPIDURAL; INFILTRATION; INTRACAUDAL; PERINEURAL ONCE AS NEEDED
Status: DISCONTINUED | OUTPATIENT
Start: 2021-10-19 | End: 2021-10-19 | Stop reason: HOSPADM

## 2021-10-19 RX ORDER — OXYCODONE HYDROCHLORIDE AND ACETAMINOPHEN 5; 325 MG/1; MG/1
1 TABLET ORAL EVERY 4 HOURS PRN
Status: DISCONTINUED | OUTPATIENT
Start: 2021-10-19 | End: 2021-10-20 | Stop reason: HOSPADM

## 2021-10-19 RX ORDER — ONDANSETRON 2 MG/ML
4 INJECTION INTRAMUSCULAR; INTRAVENOUS ONCE AS NEEDED
Status: DISCONTINUED | OUTPATIENT
Start: 2021-10-19 | End: 2021-10-19 | Stop reason: HOSPADM

## 2021-10-19 RX ORDER — HYDROCODONE BITARTRATE AND ACETAMINOPHEN 5; 325 MG/1; MG/1
1 TABLET ORAL EVERY 4 HOURS PRN
Status: ACTIVE | OUTPATIENT
Start: 2021-10-19 | End: 2021-10-20

## 2021-10-19 RX ORDER — DIPHENHYDRAMINE HCL 25 MG
25 CAPSULE ORAL NIGHTLY PRN
Status: DISCONTINUED | OUTPATIENT
Start: 2021-10-19 | End: 2021-10-20 | Stop reason: HOSPADM

## 2021-10-19 RX ORDER — DEXMEDETOMIDINE HYDROCHLORIDE 100 UG/ML
INJECTION, SOLUTION INTRAVENOUS AS NEEDED
Status: DISCONTINUED | OUTPATIENT
Start: 2021-10-19 | End: 2021-10-19 | Stop reason: SURG

## 2021-10-19 RX ORDER — SODIUM CHLORIDE 9 MG/ML
40 INJECTION, SOLUTION INTRAVENOUS AS NEEDED
Status: DISCONTINUED | OUTPATIENT
Start: 2021-10-19 | End: 2021-10-19 | Stop reason: HOSPADM

## 2021-10-19 RX ORDER — FENTANYL CITRATE 50 UG/ML
50 INJECTION, SOLUTION INTRAMUSCULAR; INTRAVENOUS
Status: DISCONTINUED | OUTPATIENT
Start: 2021-10-19 | End: 2021-10-19 | Stop reason: HOSPADM

## 2021-10-19 RX ORDER — BUPIVACAINE HYDROCHLORIDE 5 MG/ML
INJECTION, SOLUTION PERINEURAL
Status: COMPLETED | OUTPATIENT
Start: 2021-10-19 | End: 2021-10-19

## 2021-10-19 RX ORDER — SODIUM CHLORIDE 0.9 % (FLUSH) 0.9 %
10 SYRINGE (ML) INJECTION EVERY 12 HOURS SCHEDULED
Status: DISCONTINUED | OUTPATIENT
Start: 2021-10-19 | End: 2021-10-19 | Stop reason: HOSPADM

## 2021-10-19 RX ORDER — HYDROCODONE BITARTRATE AND ACETAMINOPHEN 7.5; 325 MG/1; MG/1
1 TABLET ORAL EVERY 4 HOURS PRN
Status: DISPENSED | OUTPATIENT
Start: 2021-10-19 | End: 2021-10-20

## 2021-10-19 RX ORDER — METOCLOPRAMIDE HYDROCHLORIDE 5 MG/ML
10 INJECTION INTRAMUSCULAR; INTRAVENOUS EVERY 4 HOURS PRN
Status: ACTIVE | OUTPATIENT
Start: 2021-10-19 | End: 2021-10-20

## 2021-10-19 RX ORDER — SUCCINYLCHOLINE CHLORIDE 20 MG/ML
INJECTION INTRAMUSCULAR; INTRAVENOUS AS NEEDED
Status: DISCONTINUED | OUTPATIENT
Start: 2021-10-19 | End: 2021-10-19 | Stop reason: SURG

## 2021-10-19 RX ORDER — DOCUSATE SODIUM 100 MG/1
100 CAPSULE, LIQUID FILLED ORAL 2 TIMES DAILY PRN
Status: DISCONTINUED | OUTPATIENT
Start: 2021-10-19 | End: 2021-10-20 | Stop reason: HOSPADM

## 2021-10-19 RX ORDER — KETOROLAC TROMETHAMINE 30 MG/ML
30 INJECTION, SOLUTION INTRAMUSCULAR; INTRAVENOUS EVERY 6 HOURS PRN
Status: COMPLETED | OUTPATIENT
Start: 2021-10-19 | End: 2021-10-20

## 2021-10-19 RX ORDER — ONDANSETRON 2 MG/ML
4 INJECTION INTRAMUSCULAR; INTRAVENOUS ONCE AS NEEDED
Status: ACTIVE | OUTPATIENT
Start: 2021-10-19 | End: 2021-10-20

## 2021-10-19 RX ORDER — DIPHENHYDRAMINE HYDROCHLORIDE 50 MG/ML
25 INJECTION INTRAMUSCULAR; INTRAVENOUS EVERY 4 HOURS PRN
Status: DISCONTINUED | OUTPATIENT
Start: 2021-10-19 | End: 2021-10-20 | Stop reason: HOSPADM

## 2021-10-19 RX ORDER — PHENYLEPHRINE HYDROCHLORIDE 10 MG/ML
INJECTION INTRAVENOUS AS NEEDED
Status: DISCONTINUED | OUTPATIENT
Start: 2021-10-19 | End: 2021-10-19 | Stop reason: SURG

## 2021-10-19 RX ORDER — OXYCODONE AND ACETAMINOPHEN 10; 325 MG/1; MG/1
1 TABLET ORAL EVERY 4 HOURS PRN
Status: DISCONTINUED | OUTPATIENT
Start: 2021-10-19 | End: 2021-10-20 | Stop reason: HOSPADM

## 2021-10-19 RX ORDER — VENLAFAXINE HYDROCHLORIDE 37.5 MG/1
75 CAPSULE, EXTENDED RELEASE ORAL DAILY
Status: DISCONTINUED | OUTPATIENT
Start: 2021-10-20 | End: 2021-10-20 | Stop reason: HOSPADM

## 2021-10-19 RX ORDER — SODIUM CHLORIDE, SODIUM LACTATE, POTASSIUM CHLORIDE, CALCIUM CHLORIDE 600; 310; 30; 20 MG/100ML; MG/100ML; MG/100ML; MG/100ML
100 INJECTION, SOLUTION INTRAVENOUS CONTINUOUS
Status: DISCONTINUED | OUTPATIENT
Start: 2021-10-19 | End: 2021-10-20 | Stop reason: HOSPADM

## 2021-10-19 RX ORDER — MAGNESIUM HYDROXIDE 1200 MG/15ML
LIQUID ORAL AS NEEDED
Status: DISCONTINUED | OUTPATIENT
Start: 2021-10-19 | End: 2021-10-19 | Stop reason: HOSPADM

## 2021-10-19 RX ORDER — ONDANSETRON 4 MG/1
4 TABLET, FILM COATED ORAL EVERY 6 HOURS PRN
Status: DISCONTINUED | OUTPATIENT
Start: 2021-10-19 | End: 2021-10-20 | Stop reason: HOSPADM

## 2021-10-19 RX ORDER — DIPHENHYDRAMINE HYDROCHLORIDE 50 MG/ML
25 INJECTION INTRAMUSCULAR; INTRAVENOUS NIGHTLY PRN
Status: DISCONTINUED | OUTPATIENT
Start: 2021-10-19 | End: 2021-10-20 | Stop reason: HOSPADM

## 2021-10-19 RX ORDER — FENTANYL CITRATE 50 UG/ML
INJECTION, SOLUTION INTRAMUSCULAR; INTRAVENOUS AS NEEDED
Status: DISCONTINUED | OUTPATIENT
Start: 2021-10-19 | End: 2021-10-19 | Stop reason: SURG

## 2021-10-19 RX ORDER — GLYCOPYRROLATE 0.2 MG/ML
INJECTION INTRAMUSCULAR; INTRAVENOUS AS NEEDED
Status: DISCONTINUED | OUTPATIENT
Start: 2021-10-19 | End: 2021-10-19 | Stop reason: SURG

## 2021-10-19 RX ORDER — EPHEDRINE SULFATE 50 MG/ML
INJECTION, SOLUTION INTRAVENOUS AS NEEDED
Status: DISCONTINUED | OUTPATIENT
Start: 2021-10-19 | End: 2021-10-19 | Stop reason: SURG

## 2021-10-19 RX ORDER — IBUPROFEN 800 MG/1
400 TABLET ORAL EVERY 6 HOURS PRN
Status: DISCONTINUED | OUTPATIENT
Start: 2021-10-19 | End: 2021-10-20 | Stop reason: HOSPADM

## 2021-10-19 RX ORDER — LEVOTHYROXINE SODIUM 0.1 MG/1
100 TABLET ORAL
Status: DISCONTINUED | OUTPATIENT
Start: 2021-10-20 | End: 2021-10-20 | Stop reason: HOSPADM

## 2021-10-19 RX ADMIN — EPHEDRINE SULFATE 5 MG: 50 INJECTION, SOLUTION INTRAVENOUS at 09:14

## 2021-10-19 RX ADMIN — SUCCINYLCHOLINE CHLORIDE 100 MG: 20 INJECTION, SOLUTION INTRAMUSCULAR; INTRAVENOUS at 08:06

## 2021-10-19 RX ADMIN — DIPHENHYDRAMINE HYDROCHLORIDE 25 MG: 25 CAPSULE ORAL at 19:51

## 2021-10-19 RX ADMIN — KETAMINE HYDROCHLORIDE 25 MG: 10 INJECTION, SOLUTION INTRAMUSCULAR; INTRAVENOUS at 08:05

## 2021-10-19 RX ADMIN — PROPOFOL 160 MG: 10 INJECTION, EMULSION INTRAVENOUS at 08:05

## 2021-10-19 RX ADMIN — CEFAZOLIN SODIUM 2 G: 2 SOLUTION INTRAVENOUS at 08:12

## 2021-10-19 RX ADMIN — EPHEDRINE SULFATE 5 MG: 50 INJECTION, SOLUTION INTRAVENOUS at 09:27

## 2021-10-19 RX ADMIN — SODIUM CHLORIDE, POTASSIUM CHLORIDE, SODIUM LACTATE AND CALCIUM CHLORIDE 100 ML/HR: 600; 310; 30; 20 INJECTION, SOLUTION INTRAVENOUS at 19:52

## 2021-10-19 RX ADMIN — PHENYLEPHRINE HYDROCHLORIDE 50 MCG: 10 INJECTION INTRAVENOUS at 08:21

## 2021-10-19 RX ADMIN — NEOSTIGMINE METHYLSULFATE 4 MG: 1 INJECTION INTRAVENOUS at 09:36

## 2021-10-19 RX ADMIN — BUPIVACAINE HYDROCHLORIDE 1 ML: 5 INJECTION, SOLUTION PERINEURAL at 07:53

## 2021-10-19 RX ADMIN — PHENYLEPHRINE HYDROCHLORIDE 100 MCG: 10 INJECTION INTRAVENOUS at 08:32

## 2021-10-19 RX ADMIN — ROCURONIUM BROMIDE 25 MG: 10 INJECTION INTRAVENOUS at 08:16

## 2021-10-19 RX ADMIN — KETOROLAC TROMETHAMINE 30 MG: 30 INJECTION, SOLUTION INTRAMUSCULAR; INTRAVENOUS at 09:36

## 2021-10-19 RX ADMIN — ONDANSETRON 4 MG: 2 INJECTION INTRAMUSCULAR; INTRAVENOUS at 07:28

## 2021-10-19 RX ADMIN — SODIUM CHLORIDE, POTASSIUM CHLORIDE, SODIUM LACTATE AND CALCIUM CHLORIDE 100 ML/HR: 600; 310; 30; 20 INJECTION, SOLUTION INTRAVENOUS at 11:10

## 2021-10-19 RX ADMIN — DEXMEDETOMIDINE 4 MCG: 100 INJECTION, SOLUTION, CONCENTRATE INTRAVENOUS at 08:44

## 2021-10-19 RX ADMIN — KETAMINE HYDROCHLORIDE 10 MG: 10 INJECTION, SOLUTION INTRAMUSCULAR; INTRAVENOUS at 09:16

## 2021-10-19 RX ADMIN — FENTANYL CITRATE 50 MCG: 50 INJECTION INTRAMUSCULAR; INTRAVENOUS at 08:08

## 2021-10-19 RX ADMIN — HYDROCODONE BITARTRATE AND ACETAMINOPHEN 1 TABLET: 7.5; 325 TABLET ORAL at 19:04

## 2021-10-19 RX ADMIN — EPHEDRINE SULFATE 5 MG: 50 INJECTION, SOLUTION INTRAVENOUS at 08:37

## 2021-10-19 RX ADMIN — MIDAZOLAM HYDROCHLORIDE 1 MG: 1 INJECTION, SOLUTION INTRAMUSCULAR; INTRAVENOUS at 07:32

## 2021-10-19 RX ADMIN — EPHEDRINE SULFATE 5 MG: 50 INJECTION, SOLUTION INTRAVENOUS at 08:30

## 2021-10-19 RX ADMIN — PHENYLEPHRINE HYDROCHLORIDE 50 MCG: 10 INJECTION INTRAVENOUS at 08:10

## 2021-10-19 RX ADMIN — GLYCOPYRROLATE 0.1 MG: 0.2 INJECTION INTRAMUSCULAR; INTRAVENOUS at 08:28

## 2021-10-19 RX ADMIN — PHENYLEPHRINE HYDROCHLORIDE 50 MCG: 10 INJECTION INTRAVENOUS at 08:15

## 2021-10-19 RX ADMIN — PHENYLEPHRINE HYDROCHLORIDE 100 MCG: 10 INJECTION INTRAVENOUS at 09:27

## 2021-10-19 RX ADMIN — PHENYLEPHRINE HYDROCHLORIDE 100 MCG: 10 INJECTION INTRAVENOUS at 08:27

## 2021-10-19 RX ADMIN — DEXMEDETOMIDINE 2 MCG: 100 INJECTION, SOLUTION, CONCENTRATE INTRAVENOUS at 08:57

## 2021-10-19 RX ADMIN — SCOPALAMINE 1 PATCH: 1 PATCH, EXTENDED RELEASE TRANSDERMAL at 07:28

## 2021-10-19 RX ADMIN — GLYCOPYRROLATE 0.1 MG: 0.2 INJECTION INTRAMUSCULAR; INTRAVENOUS at 08:25

## 2021-10-19 RX ADMIN — PHENYLEPHRINE HYDROCHLORIDE 100 MCG: 10 INJECTION INTRAVENOUS at 09:01

## 2021-10-19 RX ADMIN — DEXAMETHASONE SODIUM PHOSPHATE 4 MG: 4 INJECTION, SOLUTION INTRAMUSCULAR; INTRAVENOUS at 07:28

## 2021-10-19 RX ADMIN — KETOROLAC TROMETHAMINE 30 MG: 30 INJECTION, SOLUTION INTRAMUSCULAR; INTRAVENOUS at 20:22

## 2021-10-19 RX ADMIN — ROCURONIUM BROMIDE 5 MG: 10 INJECTION INTRAVENOUS at 09:02

## 2021-10-19 RX ADMIN — MORPHINE SULFATE 0.2 MG: 0.5 INJECTION EPIDURAL; INTRATHECAL; INTRAVENOUS at 07:53

## 2021-10-19 RX ADMIN — SODIUM CHLORIDE, POTASSIUM CHLORIDE, SODIUM LACTATE AND CALCIUM CHLORIDE 9 ML/HR: 600; 310; 30; 20 INJECTION, SOLUTION INTRAVENOUS at 06:45

## 2021-10-19 RX ADMIN — FENTANYL CITRATE 50 MCG: 50 INJECTION INTRAMUSCULAR; INTRAVENOUS at 08:05

## 2021-10-19 RX ADMIN — GLYCOPYRROLATE 0.6 MG: 0.2 INJECTION INTRAMUSCULAR; INTRAVENOUS at 09:36

## 2021-10-19 RX ADMIN — PHENYLEPHRINE HYDROCHLORIDE 100 MCG: 10 INJECTION INTRAVENOUS at 08:46

## 2021-10-19 RX ADMIN — MIDAZOLAM HYDROCHLORIDE 1 MG: 1 INJECTION, SOLUTION INTRAMUSCULAR; INTRAVENOUS at 07:40

## 2021-10-19 RX ADMIN — KETOROLAC TROMETHAMINE 30 MG: 30 INJECTION, SOLUTION INTRAMUSCULAR; INTRAVENOUS at 14:59

## 2021-10-19 RX ADMIN — PHENYLEPHRINE HYDROCHLORIDE 100 MCG: 10 INJECTION INTRAVENOUS at 09:14

## 2021-10-19 RX ADMIN — LIDOCAINE HYDROCHLORIDE 80 MG: 20 INJECTION, SOLUTION INFILTRATION; PERINEURAL at 08:05

## 2021-10-19 RX ADMIN — PHENYLEPHRINE HYDROCHLORIDE 100 MCG: 10 INJECTION INTRAVENOUS at 08:37

## 2021-10-19 NOTE — PLAN OF CARE
Problem: Adult Inpatient Plan of Care  Goal: Plan of Care Review  Outcome: Ongoing, Progressing  Goal: Patient-Specific Goal (Individualized)  Outcome: Ongoing, Progressing  Goal: Absence of Hospital-Acquired Illness or Injury  Outcome: Ongoing, Progressing  Intervention: Identify and Manage Fall Risk  Recent Flowsheet Documentation  Taken 10/19/2021 1456 by Shannan Kenyon RN  Safety Promotion/Fall Prevention: safety round/check completed  Taken 10/19/2021 1125 by Shannan Kenyon RN  Safety Promotion/Fall Prevention: safety round/check completed  Intervention: Prevent Skin Injury  Recent Flowsheet Documentation  Taken 10/19/2021 1456 by Shannan Kenyon RN  Body Position: sitting up in bed  Taken 10/19/2021 1125 by Shannan Kenyon RN  Body Position: sitting up in bed  Intervention: Prevent and Manage VTE (venous thromboembolism) Risk  Recent Flowsheet Documentation  Taken 10/19/2021 1125 by Shannan Kenyon RN  VTE Prevention/Management:   sequential compression devices on   bilateral  Goal: Optimal Comfort and Wellbeing  Outcome: Ongoing, Progressing  Intervention: Provide Person-Centered Care  Recent Flowsheet Documentation  Taken 10/19/2021 1125 by Shannan Kenyon RN  Trust Relationship/Rapport:   care explained   choices provided  Goal: Readiness for Transition of Care  Outcome: Ongoing, Progressing   Goal Outcome Evaluation:

## 2021-10-19 NOTE — ANESTHESIA PROCEDURE NOTES
Spinal Block      Patient reassessed immediately prior to procedure    Patient location during procedure: pre-op  Start Time: 10/19/2021 7:37 AM  Stop Time: 10/19/2021 7:53 AM  Indication:at surgeon's request and post-op pain management  Performed By  CRNA: Fransisca Proctor CRNA  Preanesthetic Checklist  Completed: patient identified, IV checked, site marked, risks and benefits discussed, surgical consent, monitors and equipment checked, pre-op evaluation and timeout performed  Spinal Block Prep:  Patient Position:sitting  Sterile Tech:cap, gloves, mask and sterile barriers  Prep:Chloraprep  Patient Monitoring:blood pressure monitoring, continuous pulse oximetry and EKG  Spinal Block Procedure  Approach:midline  Guidance:landmark technique and palpation technique  Location:L3-L4  Needle Type:Sprotte  Needle Gauge:25 G  Placement of Spinal needle event:cerebrospinal fluid aspirated  Paresthesia: no  Fluid Appearance:clear  Medications: bupivacaine (MARCAINE) injection 0.5%, 1 mL  Med Administered at 10/19/2021 7:53 AM   Post Assessment  Patient Tolerance:patient tolerated the procedure well with no apparent complications  Complications no

## 2021-10-19 NOTE — ANESTHESIA PROCEDURE NOTES
Airway  Urgency: elective    Date/Time: 10/19/2021 8:07 AM  Airway not difficult    General Information and Staff    Patient location during procedure: OR  CRNA: Fransisca Proctor CRNA    Indications and Patient Condition  Indications for airway management: airway protection    Preoxygenated: yes  Mask difficulty assessment: 1 - vent by mask    Final Airway Details  Final airway type: endotracheal airway      Successful airway: ETT  Cuffed: yes   Successful intubation technique: direct laryngoscopy  Facilitating devices/methods: intubating stylet  Endotracheal tube insertion site: oral  Blade: Dorothea  Blade size: 3  ETT size (mm): 7.0  Cormack-Lehane Classification: grade I - full view of glottis  Placement verified by: chest auscultation and capnometry   Measured from: lips  ETT/EBT  to lips (cm): 21  Number of attempts at approach: 1  Assessment: lips, teeth, and gum same as pre-op and atraumatic intubation

## 2021-10-19 NOTE — ANESTHESIA PREPROCEDURE EVALUATION
Anesthesia Evaluation     Patient summary reviewed and Nursing notes reviewed   no history of anesthetic complications:  NPO Solid Status: > 8 hours  NPO Liquid Status: > 2 hours           Airway   Mallampati: II  TM distance: >3 FB  Neck ROM: full  No difficulty expected  Dental - normal exam     Pulmonary - negative pulmonary ROS and normal exam    breath sounds clear to auscultation  (-) not a smoker  Cardiovascular - normal exam  Exercise tolerance: good (4-7 METS)    ECG reviewed  Rhythm: regular  Rate: normal    (+) hyperlipidemia,     ROS comment:     Narrative & Impression    HEART RATE= 86  bpm  RR Interval= 696  ms  OK Interval= 172  ms  P Horizontal Axis= -5  deg  P Front Axis= 50  deg  QRSD Interval= 97  ms  QT Interval= 392  ms  QRS Axis= -30  deg  T Wave Axis= 68  deg  - BORDERLINE ECG -  Sinus rhythm  Probable left atrial enlargement  Left axis deviation  NO PRIOR TRACING AVAILABLE FOR COMPARISON  Electronically Signed By: Bhakti Hauser ([facility]) 06-Nov-2020 16:16:07  Date and Time of Study: 2020-11-06 10:25:56        Neuro/Psych  (+) psychiatric history Depression,     GI/Hepatic/Renal/Endo    (+) obesity,   thyroid problem hypothyroidism    Musculoskeletal     Abdominal   (+) obese,    Substance History - negative use     OB/GYN negative ob/gyn ROS         Other   arthritis,    history of cancer remission    ROS/Med Hx Other:                           Anesthesia Plan    ASA 2     general and ITN     intravenous induction     Anesthetic plan, all risks, benefits, and alternatives have been provided, discussed and informed consent has been obtained with: patient.  Use of blood products discussed with patient  Consented to blood products.   Plan discussed with CRNA.

## 2021-10-19 NOTE — ANESTHESIA POSTPROCEDURE EVALUATION
Patient: Cecily Broussard    Procedure Summary     Date: 10/19/21 Room / Location:  [facility] OR 2 /  [facility] OR    Anesthesia Start: 0800 Anesthesia Stop: 0951    Procedure: TOTAL LAPAROSCOPIC HYSTERECTOMY, right salpingo-oophorectomy (Right Abdomen) Diagnosis:       H/O abnormal cervical Papanicolaou smear      Dysplasia of cervix, high grade REAGAN 2      (H/O abnormal cervical Papanicolaou smear [Z87.42])      (Dysplasia of cervix, high grade REAGAN 2 [N87.1])    Surgeons: Queta Brandt MD Provider: Fransisca Proctor CRNA    Anesthesia Type: general, ITN ASA Status: 2          Anesthesia Type: general, ITN    Vitals  Vitals Value Taken Time   /78 10/19/21 1040   Temp 97.8 °F (36.6 °C) 10/19/21 1030   Pulse 87 10/19/21 1044   Resp 16 10/19/21 1030   SpO2 94 % 10/19/21 1044   Vitals shown include unvalidated device data.        Post Anesthesia Care and Evaluation    Patient location during evaluation: PACU  Patient participation: complete - patient participated  Level of consciousness: awake and alert  Pain score: 2  Pain management: adequate  Airway patency: patent  Anesthetic complications: No anesthetic complications  PONV Status: none  Cardiovascular status: acceptable  Respiratory status: acceptable  Hydration status: acceptable

## 2021-10-19 NOTE — INTERVAL H&P NOTE
"H&P reviewed. The patient was examined and there are no changes to the H&P.  /97 (BP Location: Left arm, Patient Position: Lying)   Pulse 89   Temp 98.7 °F (37.1 °C) (Oral)   Resp 20   Ht 160 cm (63\")   Wt 83.5 kg (184 lb)   LMP  (LMP Unknown)   SpO2 95%   BMI 32.59 kg/m²   Procedure reviewed and all questions answered.   Queta Brandt MD    "

## 2021-10-19 NOTE — OP NOTE
Subjective     Date of Service:  10/19/21  Time of Service:  09:46 EDT    Surgical Staff: Surgeon(s) and Role:     * Queta Brandt MD - Primary   Additional Staff: Chema Mahan CFA   Pre-operative diagnosis(es): Pre-Op Diagnosis Codes:     * H/O abnormal cervical Papanicolaou smear [Z87.42]     * Dysplasia of cervix, high grade REAGAN 2 [N87.1]     Post-operative diagnosis(es): Post-Op Diagnosis Codes:     * H/O abnormal cervical Papanicolaou smear [Z87.42]     * Dysplasia of cervix, high grade REAGAN 2 [N87.1]   Procedure(s): Procedure(s):  TOTAL LAPAROSCOPIC HYSTERECTOMY, right salpingo-oophorectomy     Antibiotics: cefazolin (Ancef)ordered on call to OR     Anesthesia: Type: Choice  ASA:  II     Objective      Operative findings: Normal liver edge and appendix  Small uterus   L ovary densely adherent to iliac vessels and ureter  Normal R atrophic ovary     Specimens removed: ID Type Source Tests Collected by Time   A (Not marked as sent) :  Tissue Uterus, Cervix, R Fallopian Tube, R Ovary TISSUE PATHOLOGY EXAM Queta Brandt MD 10/19/2021 0930      Fluid Intake: 800mL   Output: Documented Output  Est. Blood Loss 20mL  Urine Output 100mL clear urine at the end of the procedure      No intake/output data recorded.     Blood products used: No   Drains: Urethral Catheter Non-latex; Double-lumen 16 Fr. (Active)      Implant Information: Nothing was implanted during the procedure   Complications: None apparent   Intraoperative consult(s): none   Condition: stable   Disposition: to PACU and [facility]         Assessment/Plan     After informed consent was obtained and the patient had received intrathecal narcotics, she was taken to the operating room where general endotracheal anesthesia was administered without difficulty.  She was placed in yellowfin stirrups and prepped and draped in normal sterile fashion.  A Foy catheter was inserted.  The [employer] uterine manipulator system was  then placed according to 's instructions by the assistant.  An infra umbilical skin incision was made and the Veress needle was inserted at a 45 degree angle.  The water drop test was positive.  Opening pressures were low and insufflation was begun until there was tympany in all 4 quadrants. All port sites were injected with lidocaine.   A 5 mm clear view trocar was then placed under direct visualization direct entry into the abdomen was confirmed with the laparoscope.  An 10 mm left lower quadrant and a 5 mm right lower quadrant port were then placed under direct visualization.  The patient was then placed in Trendelenburg.  The liver edge and appendix were both normal.  All peritoneal surfaces were normal.  Survey of the pelvis revealed the Rupa balloon had perforated the fundus of the uterus and there was no bleeding noted. .   Both ureters were located and noted to be peristalsing normally.  The right tube and ovary were normal and were freely mobile.  The left tube and ovary were densely adherent to the iliac vessels and the ureter on the left pelvic sidewall.  The bowel was moved out of the pelvis.  The harmonic scalpel was then used to go across the IP ligament on the right side and the right ovary was detached.  The left ovary was so densely adherent to vital structures on the pelvic sidewall I did not feel that the benefits to removal outweigh the significant risk of injury to vascular structures.  The round ligaments on both sides were then coagulated and cut with harmonic scalpel and the anterior leaf of the broad ligament was then taken down and the bladder was dissected down sharply past the level of the colpotomy cup.  The uterine ovarian arteries were then coagulated and cut.  The uterine arteries were then skeletonized and pretreated at the level of the cup with the bipolar cautery.  Blanching of the uterus consistent with bilateral ligation of the uterine arteries was noted.  The  anterior colpotomy was then made with a active blade of the harmonic scalpel and taken around circumferentially until the uterus was detached.  The Rupa uterine manipulator system was then removed.  The uterus was then pulled out of the pelvis and into the vagina to try to find a continuing means of pneumoperitoneum.  The cuff was then closed from above with a series of 0 Vicryl sutures using extracorporeal knot tying.  The specimen was then removed from the vagina and sent off for permanent section.  The cuff was explored from below while watching from above.  Good closure was noted and no evidence of defect was appreciated.    The pelvis was then copiously irrigated and cleared of all clot and debris and all operative sites were hemostatic.  The procedure was deemed complete.  All instruments were removed under direct visualization and gas was allowed to escape the abdomen.  The fascia of the 10 mm port was closed with 2-0 Vicryl.  The incisions were closed with 4-0 Monocryl.  Sponge, lap, needle and instrument counts were all correct.  The patient was extubated without difficulty and taken to recovery room in good condition.  All counts were correct for the procedure.  The patient will be transferred to the [facility] center to be in observation overnight. Assistant: Chema Mahan CSA was responsible for performing the following activities: Retraction, Suction, Irrigation, Suturing, Closing, Placing Dressing and Held/Positioned Camera and their skilled assistance was necessary for the success of this case.     Queta Brandt MD

## 2021-10-20 VITALS
HEIGHT: 63 IN | OXYGEN SATURATION: 93 % | WEIGHT: 184 LBS | BODY MASS INDEX: 32.6 KG/M2 | TEMPERATURE: 98.1 F | DIASTOLIC BLOOD PRESSURE: 73 MMHG | RESPIRATION RATE: 16 BRPM | HEART RATE: 85 BPM | SYSTOLIC BLOOD PRESSURE: 140 MMHG

## 2021-10-20 LAB
DEPRECATED RDW RBC AUTO: 40.7 FL (ref 37–54)
ERYTHROCYTE [DISTWIDTH] IN BLOOD BY AUTOMATED COUNT: 12.9 % (ref 12.3–15.4)
HCT VFR BLD AUTO: 38.4 % (ref 34–46.6)
HGB BLD-MCNC: 12.1 G/DL (ref 12–15.9)
MCH RBC QN AUTO: 26.8 PG (ref 26.6–33)
MCHC RBC AUTO-ENTMCNC: 31.5 G/DL (ref 31.5–35.7)
MCV RBC AUTO: 85 FL (ref 79–97)
PLATELET # BLD AUTO: 235 10*3/MM3 (ref 140–450)
PMV BLD AUTO: 10.3 FL (ref 6–12)
RBC # BLD AUTO: 4.52 10*6/MM3 (ref 3.77–5.28)
WBC # BLD AUTO: 10.15 10*3/MM3 (ref 3.4–10.8)

## 2021-10-20 PROCEDURE — G0378 HOSPITAL OBSERVATION PER HR: HCPCS

## 2021-10-20 PROCEDURE — 25010000002 KETOROLAC TROMETHAMINE PER 15 MG: Performed by: NURSE ANESTHETIST, CERTIFIED REGISTERED

## 2021-10-20 PROCEDURE — 99024 POSTOP FOLLOW-UP VISIT: CPT | Performed by: NURSE PRACTITIONER

## 2021-10-20 PROCEDURE — 85027 COMPLETE CBC AUTOMATED: CPT | Performed by: OBSTETRICS & GYNECOLOGY

## 2021-10-20 RX ORDER — PSEUDOEPHEDRINE HCL 30 MG
100 TABLET ORAL 2 TIMES DAILY PRN
Qty: 60 CAPSULE | Refills: 0 | Status: SHIPPED | OUTPATIENT
Start: 2021-10-20 | End: 2022-02-14

## 2021-10-20 RX ORDER — HYDROCODONE BITARTRATE AND ACETAMINOPHEN 5; 325 MG/1; MG/1
2 TABLET ORAL EVERY 4 HOURS PRN
Qty: 20 TABLET | Refills: 0 | Status: SHIPPED | OUTPATIENT
Start: 2021-10-20 | End: 2022-02-14

## 2021-10-20 RX ORDER — IBUPROFEN 400 MG/1
400 TABLET ORAL EVERY 6 HOURS PRN
Qty: 30 TABLET | Refills: 0 | Status: SHIPPED | OUTPATIENT
Start: 2021-10-20 | End: 2022-02-14

## 2021-10-20 RX ADMIN — SODIUM CHLORIDE, POTASSIUM CHLORIDE, SODIUM LACTATE AND CALCIUM CHLORIDE 100 ML/HR: 600; 310; 30; 20 INJECTION, SOLUTION INTRAVENOUS at 06:04

## 2021-10-20 RX ADMIN — LEVOTHYROXINE SODIUM 100 MCG: 100 TABLET ORAL at 05:42

## 2021-10-20 RX ADMIN — KETOROLAC TROMETHAMINE 30 MG: 30 INJECTION, SOLUTION INTRAMUSCULAR; INTRAVENOUS at 03:10

## 2021-10-20 RX ADMIN — VENLAFAXINE HYDROCHLORIDE 75 MG: 37.5 CAPSULE, EXTENDED RELEASE ORAL at 08:07

## 2021-10-20 NOTE — DISCHARGE SUMMARY
Date of Admission: 10/19/2021  6:10 AM      Date of Discharge:  10/20/2021    Admitting Service: [facility]    Admission Diagnosis: Abnormal cervical pap, dysplasia of cervix  Discharge Diagnosis: Abnormal cervical pap, dysplasia of cervix      Presenting Problem/History of Present Illness  H/O abnormal cervical Papanicolaou smear [Z87.42]  Dysplasia of cervix, high grade REAGAN 2 [N87.1]       Hospital Course  Patient is a 61 y.o. female presented with a history of abnormal pap smear. She wanted definitive management of her abnormal cytology and proceeded with a TLH/BSO. She has progressed well overnight.  She is voiding without difficulty. She reports flatus. Her pain is well controlled. She is ambulating without difficulty. She reports she is ready for discharge. Her discharge medications, restrictions, warn s/s, and follow up have all been disc. She has her 2 week postop follow up with Dr. Brandt.     Procedures Performed  Procedure(s):  TOTAL LAPAROSCOPIC HYSTERECTOMY, right salpingo-oophorectomy       Consults:   Consults     No orders found for last 30 day(s).          Pertinent Test Results: labs: CBC    Condition on Discharge:  Satisfactory     Vital Signs  Temp:  [97.2 °F (36.2 °C)-98.4 °F (36.9 °C)] 98.1 °F (36.7 °C)  Heart Rate:  [78-95] 85  Resp:  [14-16] 16  BP: ()/(60-86) 140/73    Physical Exam:   General Appearance: alert, pleasant, appears stated age and cooperative  Lungs: clear to auscultation, respirations regular, respirations even and respirations unlabored  Abdomen: soft, bowel sounds present, dressing C/D/I x 3  Extremities: moves extremities well, no edema, no tenderness and Alexandria's sign negative  Skin: no bleeding, bruising or rash  Psych: normal    Discharge Disposition  Home or Self Care    Discharge Medications     Discharge Medications      New Medications      Instructions Start Date   docusate sodium 100 MG capsule   100 mg, Oral, 2 Times Daily PRN      HYDROcodone-acetaminophen  5-325 MG per tablet  Commonly known as: NORCO   2 tablets, Oral, Every 4 Hours PRN      ibuprofen 400 MG tablet  Commonly known as: ADVIL,MOTRIN   400 mg, Oral, Every 6 Hours PRN         Continue These Medications      Instructions Start Date   celecoxib 200 MG capsule  Commonly known as: CeleBREX   TAKE ONE CAPSULE BY MOUTH DAILY      ezetimibe 10 MG tablet  Commonly known as: ZETIA   10 mg, Oral, Daily      levothyroxine 100 MCG tablet  Commonly known as: SYNTHROID, LEVOTHROID   100 mcg, Oral, Daily      traMADol  MG 24 hr tablet  Commonly known as: ULTRAM-ER   TAKE ONE TABLET BY MOUTH DAILY      venlafaxine XR 75 MG 24 hr capsule  Commonly known as: EFFEXOR-XR   TAKE ONE CAPSULE BY MOUTH DAILY      Vitamin D 50 MCG (2000 UT) capsule   2,000 Units, Oral, Daily             Discharge Diet:   Diet Instructions     Diet: Regular; Thin      Discharge Diet: Regular    Fluid Consistency: Thin          Activity at Discharge:   Activity Instructions     Driving Restrictions      Type of Restriction: Driving    Driving Restrictions: No Driving (Time Limited)    Length: 2 Weeks    Lifting Restrictions      Type of Restriction: Lifting    Lifting Restrictions: Avoid Straining to Lift    Length of Lifting Restriction: Do not lift more than 10#    Pelvic Rest      Sexual Activity Restrictions      Type of Restriction: Sex    Explain Sexual Activity Restrictions: Nothing in the vagina x 6 weeks          Follow-up Appointments  Future Appointments   Date Time Provider Department Center   11/1/2021 10:00 AM Queta rBandt MD [facility]   11/29/2021 10:00 AM [facility]   12/2/2021 11:15 AM Queta Brandt MD [facility]   2/14/2022 11:15 AM Lyubov Whelan APRN [facility]     Additional Instructions for the Follow-ups that You Need to Schedule     Call MD With Problems / Concerns   As directed      Instructions: Instructions:   1. No driving for 2 wks or while taking pain  medication   2. Call for temp>101, heavy vaginal bleeding or increasing lower abdominal pain.  3. NOTHING IN THE VAGINA for 6 weeks.    4. No heavy lifting for 6 weeks.   5. Bath or shower are fine.    6. Continue to use the incentive spirometer  7. Call for any concerns     Our office phone number:  [phone]    Order Comments: Instructions: Instructions: 1. No driving for 2 wks or while taking pain medication 2. Call for temp>101, heavy vaginal bleeding or increasing lower abdominal pain. 3. NOTHING IN THE VAGINA for 6 weeks.  4. No heavy lifting for 6 weeks. 5. Bath or shower are fine.  6. Continue to use the incentive spirometer 7. Call for any concerns  Our office phone number:  [phone]          Discharge Follow-up with Specialty: Dr. Queta Brandt; 2 Weeks   As directed      Specialty: Dr. Queta Brandt    Follow Up: 2 Weeks               Test Results Pending at Discharge  Pending Labs     Order Current Status    Tissue Pathology Exam In process           ESTHER Escalona  10/20/21  09:25 EDT    Time: Discharge 20 min

## 2021-10-20 NOTE — PLAN OF CARE
Goal Outcome Evaluation:  Plan of Care Reviewed With: patient        Progress: improving  Outcome Summary: Pain controlled, VSS, bandages C/D/I. Plan to remove f/c this AM. Plan to discharge home later today.

## 2021-10-20 NOTE — PLAN OF CARE
Problem: Adult Inpatient Plan of Care  Goal: Plan of Care Review  Outcome: Met  Goal: Patient-Specific Goal (Individualized)  Outcome: Met  Goal: Absence of Hospital-Acquired Illness or Injury  Outcome: Met  Intervention: Identify and Manage Fall Risk  Recent Flowsheet Documentation  Taken 10/20/2021 0808 by Shannan Kenyon RN  Safety Promotion/Fall Prevention: safety round/check completed  Goal: Optimal Comfort and Wellbeing  Outcome: Met  Intervention: Provide Person-Centered Care  Recent Flowsheet Documentation  Taken 10/20/2021 0808 by Shannan Kenyon RN  Trust Relationship/Rapport:   care explained   choices provided  Goal: Readiness for Transition of Care  Outcome: Met     Problem: Bleeding (Hysterectomy)  Goal: Absence of Bleeding  Outcome: Met     Problem: Bowel Elimination Impaired (Hysterectomy)  Goal: Effective Bowel Elimination  Outcome: Met     Problem: Infection (Hysterectomy)  Goal: Absence of Infection Signs and Symptoms  Outcome: Met     Problem: Ongoing Anesthesia Effects (Hysterectomy)  Goal: Anesthesia/Sedation Recovery  Outcome: Met  Intervention: Optimize Anesthesia Recovery  Recent Flowsheet Documentation  Taken 10/20/2021 0808 by Shannan Kenyon RN  Safety Promotion/Fall Prevention: safety round/check completed     Problem: Pain (Hysterectomy)  Goal: Acceptable Pain Control  Outcome: Met     Problem: Postoperative Nausea and Vomiting (Hysterectomy)  Goal: Nausea and Vomiting Relief  Outcome: Met     Problem: Postoperative Urinary Retention (Hysterectomy)  Goal: Effective Urinary Elimination  Outcome: Met   Goal Outcome Evaluation:

## 2021-10-21 LAB
LAB AP CASE REPORT: NORMAL
LAB AP CLINICAL INFORMATION: NORMAL
LAB AP DIAGNOSIS COMMENT: NORMAL
PATH REPORT.FINAL DX SPEC: NORMAL
PATH REPORT.GROSS SPEC: NORMAL

## 2021-10-21 NOTE — CASE MANAGEMENT/SOCIAL WORK
Case Management Discharge Note      Final Note: dc home         Selected Continued Care - Discharged on 10/20/2021 Admission date: 10/19/2021 - Discharge disposition: Home or Self Care    Destination    No services have been selected for the patient.              Durable Medical Equipment    No services have been selected for the patient.              Dialysis/Infusion    No services have been selected for the patient.              Home Medical Care    No services have been selected for the patient.              Therapy    No services have been selected for the patient.              Community Resources    No services have been selected for the patient.              Community & DME    No services have been selected for the patient.                       Final Discharge Disposition Code: 01 - home or self-care

## 2021-10-26 NOTE — TELEPHONE ENCOUNTER
RETURN CALL FROM PATIENT.  SCHEDULED AT [facility] ON 03/30/2022 AT 10:15AM - ARRIVE 9AM.  WILL MAIL INSTRUCTIONS.

## 2021-11-01 ENCOUNTER — OFFICE VISIT (OUTPATIENT)
Dept: OBSTETRICS AND GYNECOLOGY | Facility: CLINIC | Age: 61
End: 2021-11-01

## 2021-11-01 VITALS
HEIGHT: 63 IN | BODY MASS INDEX: 32.25 KG/M2 | SYSTOLIC BLOOD PRESSURE: 132 MMHG | DIASTOLIC BLOOD PRESSURE: 82 MMHG | WEIGHT: 182 LBS

## 2021-11-01 DIAGNOSIS — Z09 POSTOP CHECK: Primary | ICD-10-CM

## 2021-11-01 PROCEDURE — 99024 POSTOP FOLLOW-UP VISIT: CPT | Performed by: OBSTETRICS & GYNECOLOGY

## 2021-11-01 NOTE — PROGRESS NOTES
"Surgical follow up visit     Cecily Broussard is a 61 y.o. female who presents to the clinic 2 weeks status post TLH with BS  with Oophorectomy ( right) for cervical dysplasia Eating a regular diet without difficulty. Bowel movements are normal. The patient is not having any pain..  Vaginal bleeding is none. She feels \"really good\"    The following portions of the patient's history were reviewed and updated as appropriate: allergies, current medications, past family history, past medical history, past social history, past surgical history and problem list.    Review of Systems  Review of Systems   Constitutional: Positive for activity change.   All other systems reviewed and are negative.       Objective    /82   Ht 160 cm (63\")   Wt 82.6 kg (182 lb)   LMP  (LMP Unknown)   Breastfeeding No   BMI 32.24 kg/m²     Physical Exam  Vitals and nursing note reviewed.   Constitutional:       Appearance: Normal appearance. She is well-developed.   HENT:      Head: Normocephalic and atraumatic.   Eyes:      General: No scleral icterus.     Conjunctiva/sclera: Conjunctivae normal.   Neck:      Thyroid: No thyromegaly.   Abdominal:      General: There is no distension.      Palpations: Abdomen is soft. There is no mass.      Tenderness: There is no abdominal tenderness. There is no guarding or rebound.      Hernia: No hernia is present.      Comments: Inc C/D/I, no erythema   Skin:     General: Skin is warm and dry.   Neurological:      Mental Status: She is alert and oriented to person, place, and time.   Psychiatric:         Mood and Affect: Mood normal.         Behavior: Behavior normal.         Thought Content: Thought content normal.         Judgment: Judgment normal.         Assessment     1) Post op TLH with BS  with Oophorectomy/RSO  Doing well postoperatively. REAGAN 1-2   2) Operative findings again reviewed. Pathology report discussed.  Intraoperative photos were reviewed.      Plan    1. Continue any current " medications.  2. Wound care discussed.  3. Activity restrictions: nothing in the vagina ( no tampons, douching or sex), no heavy lifting, pushing or pulling, no swimming, no use of hot tubs, no tub baths and no lifting more than 15 pounds  4. Anticipated return to work: 4 weeks.  5. Follow up: 4 week(s) for final postop check .     Queta Brandt MD     11/01/2021     17:27 EST

## 2021-11-29 ENCOUNTER — HOSPITAL ENCOUNTER (OUTPATIENT)
Dept: MAMMOGRAPHY | Facility: HOSPITAL | Age: 61
Discharge: HOME | End: 2021-11-29
Admitting: NURSE PRACTITIONER

## 2021-11-29 ENCOUNTER — OFFICE VISIT (OUTPATIENT)
Dept: OBSTETRICS AND GYNECOLOGY | Facility: CLINIC | Age: 61
End: 2021-11-29

## 2021-11-29 VITALS
DIASTOLIC BLOOD PRESSURE: 92 MMHG | SYSTOLIC BLOOD PRESSURE: 142 MMHG | HEIGHT: 63 IN | WEIGHT: 186.4 LBS | BODY MASS INDEX: 33.03 KG/M2

## 2021-11-29 DIAGNOSIS — R31.9 HEMATURIA, UNSPECIFIED TYPE: Primary | ICD-10-CM

## 2021-11-29 DIAGNOSIS — N87.1 DYSPLASIA OF CERVIX, HIGH GRADE CIN 2: ICD-10-CM

## 2021-11-29 DIAGNOSIS — R35.0 URINARY FREQUENCY: ICD-10-CM

## 2021-11-29 DIAGNOSIS — Z92.89 HISTORY OF MAMMOGRAPHY, SCREENING: ICD-10-CM

## 2021-11-29 DIAGNOSIS — Z09 POSTOPERATIVE FOLLOW-UP: ICD-10-CM

## 2021-11-29 LAB
BILIRUB BLD-MCNC: NEGATIVE MG/DL
CLARITY, POC: CLEAR
COLOR UR: YELLOW
GLUCOSE UR STRIP-MCNC: NEGATIVE MG/DL
KETONES UR QL: NEGATIVE
LEUKOCYTE EST, POC: NEGATIVE
NITRITE UR-MCNC: NEGATIVE MG/ML
PH UR: 5 [PH] (ref 5–8)
PROT UR STRIP-MCNC: NEGATIVE MG/DL
RBC # UR STRIP: ABNORMAL /UL
SP GR UR: 1 (ref 1–1.03)
UROBILINOGEN UR QL: NORMAL

## 2021-11-29 PROCEDURE — 77063 BREAST TOMOSYNTHESIS BI: CPT

## 2021-11-29 PROCEDURE — 77067 SCR MAMMO BI INCL CAD: CPT

## 2021-11-29 PROCEDURE — 81002 URINALYSIS NONAUTO W/O SCOPE: CPT | Performed by: OBSTETRICS & GYNECOLOGY

## 2021-11-29 PROCEDURE — 99024 POSTOP FOLLOW-UP VISIT: CPT | Performed by: OBSTETRICS & GYNECOLOGY

## 2021-11-29 NOTE — PROGRESS NOTES
"Surgical follow up visit     Cecily Broussard is a 61 y.o. female who presents to the clinic 6 weeks status post TLH with BS  with Oophorectomy for cervical dysplasia Eating a regular diet without difficulty. Bowel movements are normal. Pain is controlled without any medications..  Vaginal bleeding is spotting only. She has felt some urinary urgency which is new. She is not a heavy caffeine user.     The following portions of the patient's history were reviewed and updated as appropriate: allergies, current medications, past family history, past medical history, past social history, past surgical history and problem list.    Review of Systems  Review of Systems   Constitutional: Positive for activity change.   Genitourinary: Positive for frequency and urgency.   All other systems reviewed and are negative.       Objective    /92   Ht 160 cm (62.99\")   Wt 84.6 kg (186 lb 6.4 oz)   LMP  (LMP Unknown)   Breastfeeding No   BMI 33.03 kg/m²     Physical Exam  Vitals and nursing note reviewed. Exam conducted with a chaperone present.   Constitutional:       Appearance: Normal appearance. She is well-developed. She is obese.   HENT:      Head: Normocephalic and atraumatic.   Eyes:      General: No scleral icterus.     Conjunctiva/sclera: Conjunctivae normal.   Neck:      Thyroid: No thyromegaly.   Abdominal:      General: Bowel sounds are normal. There is no distension.      Palpations: Abdomen is soft. There is no mass.      Tenderness: There is no abdominal tenderness. There is no guarding or rebound.      Hernia: No hernia is present.      Comments: Inc C/D/I, well healed   Genitourinary:     General: Normal vulva.      Vagina: No signs of injury and foreign body. Bleeding present. No vaginal discharge, erythema, tenderness, lesions or prolapsed vaginal walls.      Uterus: Absent.       Adnexa: Right adnexa normal and left adnexa normal.      Comments: Uterus and cervix absent. Scan dark discharge/blood in " vault  Skin:     General: Skin is warm and dry.   Neurological:      Mental Status: She is alert and oriented to person, place, and time.   Psychiatric:         Mood and Affect: Mood normal.         Behavior: Behavior normal.         Thought Content: Thought content normal.         Judgment: Judgment normal.         Assessment     1) Post op TLH with BS  with Oophorectomy ( RSO)  Doing well postoperatively. Path REAGAN 2 with neg margins.   2) Operative findings again reviewed. Pathology report discussed.  Intraoperative photos were reviewed.      Plan    1. Continue any current medications.  2. Wound care discussed.  3. Activity restrictions: nothing in the vagina ( no tampons, douching or sex) for 2 more weeks.  4. Anticipated return to work: now.  5. REAGAN 2 on TLH specimen- repeat pap/HPV in 6 months.   6. Urinary frequency- check UA and CS. Decrease bladder irritants. If symptoms not better by 1/2022, can for bladder evaluation  7. RTO 6 months repeat pap smear.       Queta Brandt MD     11/29/2021     12:57 EST

## 2021-12-01 LAB
APPEARANCE UR: ABNORMAL
BACTERIA #/AREA URNS HPF: ABNORMAL /[HPF]
BACTERIA UR CULT: ABNORMAL
BACTERIA UR CULT: ABNORMAL
BILIRUB UR QL STRIP: NEGATIVE
CASTS URNS QL MICRO: ABNORMAL /LPF
COLOR UR: YELLOW
EPI CELLS #/AREA URNS HPF: >10 /HPF (ref 0–10)
GLUCOSE UR QL: NEGATIVE
HGB UR QL STRIP: ABNORMAL
KETONES UR QL STRIP: NEGATIVE
LEUKOCYTE ESTERASE UR QL STRIP: ABNORMAL
MICRO URNS: ABNORMAL
NITRITE UR QL STRIP: NEGATIVE
PH UR STRIP: 5.5 [PH] (ref 5–7.5)
PROT UR QL STRIP: ABNORMAL
RBC #/AREA URNS HPF: ABNORMAL /HPF (ref 0–2)
SP GR UR: 1.02 (ref 1–1.03)
UROBILINOGEN UR STRIP-MCNC: 1 MG/DL (ref 0.2–1)
WBC #/AREA URNS HPF: >30 /HPF (ref 0–5)

## 2021-12-05 RX ORDER — AMOXICILLIN 500 MG/1
500 CAPSULE ORAL 2 TIMES DAILY
Qty: 10 CAPSULE | Refills: 0 | Status: SHIPPED | OUTPATIENT
Start: 2021-12-05 | End: 2021-12-10

## 2022-02-14 ENCOUNTER — OFFICE VISIT (OUTPATIENT)
Dept: INTERNAL MEDICINE | Facility: CLINIC | Age: 62
End: 2022-02-14

## 2022-02-14 VITALS
BODY MASS INDEX: 32.96 KG/M2 | HEART RATE: 81 BPM | SYSTOLIC BLOOD PRESSURE: 140 MMHG | WEIGHT: 186 LBS | DIASTOLIC BLOOD PRESSURE: 80 MMHG | OXYGEN SATURATION: 97 % | HEIGHT: 63 IN | RESPIRATION RATE: 16 BRPM | TEMPERATURE: 97.9 F

## 2022-02-14 DIAGNOSIS — M15.9 OSTEOARTHRITIS OF MULTIPLE JOINTS, UNSPECIFIED OSTEOARTHRITIS TYPE: ICD-10-CM

## 2022-02-14 DIAGNOSIS — E03.9 ADULT HYPOTHYROIDISM: ICD-10-CM

## 2022-02-14 DIAGNOSIS — E55.9 VITAMIN D DEFICIENCY: ICD-10-CM

## 2022-02-14 DIAGNOSIS — E78.2 MIXED HYPERLIPIDEMIA: ICD-10-CM

## 2022-02-14 DIAGNOSIS — Z00.00 HEALTHCARE MAINTENANCE: Primary | ICD-10-CM

## 2022-02-14 PROCEDURE — 99214 OFFICE O/P EST MOD 30 MIN: CPT | Performed by: NURSE PRACTITIONER

## 2022-02-14 PROCEDURE — 99396 PREV VISIT EST AGE 40-64: CPT | Performed by: NURSE PRACTITIONER

## 2022-02-14 NOTE — PROGRESS NOTES
Annual Exam        Cecily Broussard is being seen for a Complete physical exam. Her last physical was 5-.     Social: She is not . She is working full time as a , retired in 2020.    Lifestyle: She does not use tobacco, 2 pack year history. She drinks 2 alcoholic drinks per month. She exercises 10,000 steps a day times a week.    Screening: Colonoscopy was completed 11-, scheduled in March 2022. Last labs reviewed from .      Reproductive Health: Her Last Pap smear was 6-7-2021.  She had a Partial Hysterectomy 10-. DEXA scan complete 10-, due at age 64 years. Last Mammogram was 11-    Dental exam is up to date. Eye exam was completed within he year, wears glasses.     She will also need a F/U on Hypothyroidism, hyperlpidemia, menopausal symptoms, and OA of multiple sites. She is on Zetia for hyperlipidemia. She is statin intolerant. She walks 12, 000 steps a day.      She is taking Celebrex daily for OA. She uses Ultram as needed for severe breakthru pain. Daily OA pain ranges for 4-6 of 10. She described diffuse joint stiffnes and pain.      She takes Effexor Xr for Menopausal symptoms. She denies any hot flashes, Chest pains, SOA. She had a cervical conization 11-. She had a partial hysterectomy 10-.     History of Present Illness     The following portions of the patient's history were reviewed and updated as appropriate: allergies, current medications, past family history, past medical history, past social history, past surgical history and problem list.    Review of Systems   Constitutional: Negative.    HENT: Negative.    Eyes: Negative.    Cardiovascular: Negative.  Negative for chest pain, palpitations and leg swelling.   Gastrointestinal: Negative.    Endocrine: Negative.    Musculoskeletal: Positive for arthralgias.   Allergic/Immunologic: Negative.    Neurological: Negative.    Hematological: Negative.    All other systems reviewed and  are negative.      Objective       General Appearance:    Alert, cooperative, no distress, appears stated age   Head:    Normocephalic, without obvious abnormality, atraumatic   Eyes:    PERRL, conjunctiva/corneas clear, EOM's intact, fundi     benign, both eyes   Ears:    Normal TM's and external ear canals, both ears   Nose:   Nares normal, septum midline, mucosa normal, no drainage     or sinus tenderness   Throat:   Lips, mucosa, and tongue normal; teeth and gums normal   Neck:   Supple, symmetrical, trachea midline, no adenopathy;     thyroid:  no enlargement/tenderness/nodules; no carotid    bruit or JVD   Back:     Symmetric, no curvature, ROM normal, no CVA tenderness   Lungs:     Clear to auscultation bilaterally, respirations unlabored   Chest Wall:    No tenderness or deformity    Heart:    Regular rate and rhythm, S1 and S2 normal, no murmur, rub    or gallop   Breast Exam:    deferred   Abdomen:     Soft, non-tender, bowel sounds active all four quadrants,     no masses, no organomegaly   Genitalia:    deferred   Rectal:    deferred   Extremities:   Extremities normal, atraumatic, no cyanosis or edema   Pulses:   2+ and symmetric all extremities   Skin:   Skin color, texture, turgor normal, no rashes or lesions   Lymph nodes:   Cervical, supraclavicular, and axillary nodes normal   Neurologic:   CNII-XII intact, normal strength, sensation and reflexes     throughout               Assessment/Plan   Diagnoses and all orders for this visit:    1. Healthcare maintenance (Primary)    2. Mixed hyperlipidemia  -     CBC (No Diff)  -     Comprehensive Metabolic Panel  -     Lipid Panel With / Chol / HDL Ratio    3. Adult hypothyroidism  -     TSH  -     T4, Free    4. Vitamin D deficiency  -     Vitamin D 25 Hydroxy    5. Osteoarthritis of multiple joints, unspecified osteoarthritis type      Preventive counseling: Reviewed cancer screening, Mammogram and Colonoscopy. Exercise counseling reviewed.    The  patient has read and signed the [facility] Controlled Substance Contract.  I will continue to see patient for regular follow up appointments.  They are well controlled on their medication.  MANDY is updated every 3 months. The patient is aware of the potential for addiction and dependence.    She will follow up at next scheduled appointment in  1year.

## 2022-02-15 LAB
25(OH)D3+25(OH)D2 SERPL-MCNC: 34.5 NG/ML (ref 30–100)
ALBUMIN SERPL-MCNC: 4.6 G/DL (ref 3.8–4.8)
ALBUMIN/GLOB SERPL: 1.6 {RATIO} (ref 1.2–2.2)
ALP SERPL-CCNC: 165 IU/L (ref 44–121)
ALT SERPL-CCNC: 28 IU/L (ref 0–32)
AST SERPL-CCNC: 32 IU/L (ref 0–40)
BILIRUB SERPL-MCNC: 0.5 MG/DL (ref 0–1.2)
BUN SERPL-MCNC: 14 MG/DL (ref 8–27)
BUN/CREAT SERPL: 19 (ref 12–28)
CALCIUM SERPL-MCNC: 9.7 MG/DL (ref 8.7–10.3)
CHLORIDE SERPL-SCNC: 99 MMOL/L (ref 96–106)
CHOLEST SERPL-MCNC: 214 MG/DL (ref 100–199)
CHOLEST/HDLC SERPL: 3.6 RATIO (ref 0–4.4)
CO2 SERPL-SCNC: 23 MMOL/L (ref 20–29)
CREAT SERPL-MCNC: 0.75 MG/DL (ref 0.57–1)
ERYTHROCYTE [DISTWIDTH] IN BLOOD BY AUTOMATED COUNT: 12.9 % (ref 11.7–15.4)
GLOBULIN SER CALC-MCNC: 2.9 G/DL (ref 1.5–4.5)
GLUCOSE SERPL-MCNC: 95 MG/DL (ref 65–99)
HCT VFR BLD AUTO: 46.6 % (ref 34–46.6)
HDLC SERPL-MCNC: 59 MG/DL
HGB BLD-MCNC: 14.9 G/DL (ref 11.1–15.9)
LDLC SERPL CALC-MCNC: 132 MG/DL (ref 0–99)
MCH RBC QN AUTO: 26.7 PG (ref 26.6–33)
MCHC RBC AUTO-ENTMCNC: 32 G/DL (ref 31.5–35.7)
MCV RBC AUTO: 84 FL (ref 79–97)
PLATELET # BLD AUTO: 274 X10E3/UL (ref 150–450)
POTASSIUM SERPL-SCNC: 4.8 MMOL/L (ref 3.5–5.2)
PROT SERPL-MCNC: 7.5 G/DL (ref 6–8.5)
RBC # BLD AUTO: 5.58 X10E6/UL (ref 3.77–5.28)
SODIUM SERPL-SCNC: 140 MMOL/L (ref 134–144)
T4 FREE SERPL-MCNC: 1.1 NG/DL (ref 0.82–1.77)
TRIGL SERPL-MCNC: 129 MG/DL (ref 0–149)
TSH SERPL DL<=0.005 MIU/L-ACNC: 2.34 UIU/ML (ref 0.45–4.5)
VLDLC SERPL CALC-MCNC: 23 MG/DL (ref 5–40)
WBC # BLD AUTO: 4.3 X10E3/UL (ref 3.4–10.8)

## 2022-02-25 ENCOUNTER — TELEPHONE (OUTPATIENT)
Dept: GASTROENTEROLOGY | Facility: CLINIC | Age: 62
End: 2022-02-25

## 2022-02-28 ENCOUNTER — PREP FOR SURGERY (OUTPATIENT)
Dept: OTHER | Facility: HOSPITAL | Age: 62
End: 2022-02-28

## 2022-02-28 DIAGNOSIS — Z86.010 PERSONAL HISTORY OF COLONIC POLYPS: Primary | ICD-10-CM

## 2022-03-04 DIAGNOSIS — E03.9 ADULT HYPOTHYROIDISM: ICD-10-CM

## 2022-03-04 RX ORDER — LEVOTHYROXINE SODIUM 0.1 MG/1
TABLET ORAL
Qty: 90 TABLET | Refills: 3 | Status: SHIPPED | OUTPATIENT
Start: 2022-03-04 | End: 2022-08-02

## 2022-03-28 ENCOUNTER — TELEPHONE (OUTPATIENT)
Dept: GASTROENTEROLOGY | Facility: CLINIC | Age: 62
End: 2022-03-28

## 2022-03-28 NOTE — TELEPHONE ENCOUNTER
PATIENT CALLED TO RESCHEDULE HER COLONOSCOPY ON 03/30/2022.  TESTED POSITIVE WITH COVID ON 03/25/2022.  NEED MORNING TIME.    SCHEDULED AT [facility] 09/30/2022 AT 9:30AM - ARRIVE 8:30AM.  WILL MAIL INSTRUCTIONS MONTH PRIOR 08/2022.

## 2022-06-22 DIAGNOSIS — M25.50 ARTHRALGIA OF MULTIPLE JOINTS: ICD-10-CM

## 2022-06-22 RX ORDER — TRAMADOL HYDROCHLORIDE 100 MG/1
100 TABLET, EXTENDED RELEASE ORAL DAILY
Qty: 30 TABLET | Refills: 0 | OUTPATIENT
Start: 2022-06-22

## 2022-06-22 NOTE — TELEPHONE ENCOUNTER
Rx Refill Note  Requested Prescriptions     Pending Prescriptions Disp Refills   • traMADol ER (ULTRAM-ER) 100 MG 24 hr tablet 30 tablet 0     Sig: Take 1 tablet by mouth Daily.      Last office visit with prescribing clinician: 2/14/2022      Next office visit with prescribing clinician: 2/15/2023            Sonia Garcia MA  06/22/22, 13:13 EDT

## 2022-06-22 NOTE — TELEPHONE ENCOUNTER
Caller: Cecily Broussard    Relationship: Self    Best call back number: [phone]    Requested Prescriptions:   Requested Prescriptions     Pending Prescriptions Disp Refills   • traMADol ER (ULTRAM-ER) 100 MG 24 hr tablet 30 tablet 0     Sig: Take 1 tablet by mouth Daily.        Pharmacy where request should be sent: [facility] [address] [facility] - [phone]  - [phone]      Additional details provided by patient: OUT OF MEDICATION     Does the patient have less than a 3 day supply:  [x] Yes  [] No    Sophia Perez Rep   06/22/22 12:21 EDT

## 2022-06-23 ENCOUNTER — OFFICE VISIT (OUTPATIENT)
Dept: OBSTETRICS AND GYNECOLOGY | Facility: CLINIC | Age: 62
End: 2022-06-23

## 2022-06-23 VITALS
DIASTOLIC BLOOD PRESSURE: 80 MMHG | HEIGHT: 63 IN | BODY MASS INDEX: 32.25 KG/M2 | SYSTOLIC BLOOD PRESSURE: 122 MMHG | WEIGHT: 182 LBS

## 2022-06-23 DIAGNOSIS — Z11.51 SPECIAL SCREENING EXAMINATION FOR HUMAN PAPILLOMAVIRUS (HPV): ICD-10-CM

## 2022-06-23 DIAGNOSIS — Z01.419 PAP SMEAR, LOW-RISK: Primary | ICD-10-CM

## 2022-06-23 DIAGNOSIS — Z87.42 H/O ABNORMAL CERVICAL PAPANICOLAOU SMEAR: ICD-10-CM

## 2022-06-23 DIAGNOSIS — Z12.31 ENCOUNTER FOR SCREENING MAMMOGRAM FOR MALIGNANT NEOPLASM OF BREAST: ICD-10-CM

## 2022-06-23 DIAGNOSIS — Z13.820 SCREENING FOR OSTEOPOROSIS: ICD-10-CM

## 2022-06-23 DIAGNOSIS — N94.10 FEMALE DYSPAREUNIA: ICD-10-CM

## 2022-06-23 LAB
BILIRUB BLD-MCNC: NEGATIVE MG/DL
CLARITY, POC: CLEAR
COLOR UR: YELLOW
GLUCOSE UR STRIP-MCNC: NEGATIVE MG/DL
KETONES UR QL: NEGATIVE
LEUKOCYTE EST, POC: NEGATIVE
NITRITE UR-MCNC: NEGATIVE MG/ML
PH UR: 5 [PH] (ref 5–8)
PROT UR STRIP-MCNC: NEGATIVE MG/DL
RBC # UR STRIP: NEGATIVE /UL
SP GR UR: 1.03 (ref 1–1.03)
UROBILINOGEN UR QL: NORMAL

## 2022-06-23 PROCEDURE — 81002 URINALYSIS NONAUTO W/O SCOPE: CPT | Performed by: OBSTETRICS & GYNECOLOGY

## 2022-06-23 PROCEDURE — 99396 PREV VISIT EST AGE 40-64: CPT | Performed by: OBSTETRICS & GYNECOLOGY

## 2022-06-23 NOTE — PROGRESS NOTES
GYN Annual Exam     CC- Here for annual exam.     Cecily Broussard is a 62 y.o. female established patient here for annual and 6 month repeat pap smear. She had a TLH/RSO in 10/2021 for recurrent abnormal pap smears and had REAGAN 2 on path specimen. This is her first 6 month repeat pap. She denies any VB or bladder issues. She occ has L sided pain with sex, no other time is it painful.        OB History        2    Para   2    Term   2            AB        Living   2       SAB        IAB        Ectopic        Molar        Multiple        Live Births              Obstetric Comments   1   1 C/S             Menarche:13  Menopause:50  HRT:none  Current contraception: S/P TLH /RSO 10/2021 ( L ovary adhered to sidewall)   History of abnormal Pap smear: yes, REAGAN 2 on TLH path  History of abnormal mammogram: no  Family history of uterine, colon or ovarian cancer: no  Family history of breast cancer: yes - mom age 69, M aunt ? age  STD's: none  Last pap smear 2021- ASCUS + HPV  BRENT; none      Health Maintenance   Topic Date Due   • ZOSTER VACCINE (1 of 2) Never done   • Annual Gynecologic Pelvic and Breast Exam  2021   • COVID-19 Vaccine (3 - Booster for Pfizer series) 2021   • INFLUENZA VACCINE  10/01/2022   • COLORECTAL CANCER SCREENING  2022   • LIPID PANEL  2023   • ANNUAL PHYSICAL  02/15/2023   • TDAP/TD VACCINES (2 - Td or Tdap) 2023   • MAMMOGRAM  2023   • PAP SMEAR  2024   • HEPATITIS C SCREENING  Completed   • Pneumococcal Vaccine 0-64  Aged Out       Past Medical History:   Diagnosis Date   • Abnormal Pap smear of cervix 2016   • Arthritis    • Cervical dysplasia 2016    REAGAN 1on bx, CKC with REAGAN 2, neg margins   • Depression    • Disease of thyroid gland    • Hyperlipidemia    • Menopause        Past Surgical History:   Procedure Laterality Date   • ABDOMINOPLASTY     • CERVICAL CONIZATION N/A 2020    Procedure: CERVICAL CONIZATION;  Surgeon: Rikki  Queta Webb MD;  Location: [facility];  Service: Obstetrics/Gynecology;  Laterality: N/A;   •  SECTION      x 1   • COLONOSCOPY     • LAPAROSCOPIC ASSISTED VAGINAL HYSTERECTOMY SALPINGO OOPHORECTOMY     • OOPHORECTOMY      RSO   • TOTAL ABDOMINAL HYSTERECTOMY WITH SALPINGO OOPHORECTOMY     • TOTAL LAPAROSCOPIC HYSTERECTOMY Right 10/19/2021    Procedure: TOTAL LAPAROSCOPIC HYSTERECTOMY, right salpingo-oophorectomy;  Surgeon: Queta Brandt MD;  Location: [facility];  Service: Obstetrics/Gynecology;  Laterality: Right;   • TUBAL ABDOMINAL LIGATION           Current Outpatient Medications:   •  celecoxib (CeleBREX) 200 MG capsule, TAKE ONE CAPSULE BY MOUTH DAILY, Disp: 90 capsule, Rfl: 1  •  Cholecalciferol (VITAMIN D) 2000 UNITS capsule, Take 1 capsule by mouth Daily., Disp: 30 capsule, Rfl: 11  •  ezetimibe (ZETIA) 10 MG tablet, Take 1 tablet by mouth Daily., Disp: 90 tablet, Rfl: 2  •  levothyroxine (SYNTHROID, LEVOTHROID) 100 MCG tablet, TAKE ONE TABLET BY MOUTH DAILY, Disp: 90 tablet, Rfl: 3  •  traMADol ER (ULTRAM-ER) 100 MG 24 hr tablet, TAKE ONE TABLET BY MOUTH DAILY, Disp: 30 tablet, Rfl: 0  •  venlafaxine XR (EFFEXOR-XR) 75 MG 24 hr capsule, TAKE ONE CAPSULE BY MOUTH DAILY, Disp: 90 capsule, Rfl: 1    Allergies   Allergen Reactions   • Estrogens Other (See Comments)     Mother had breast cancer  Mother had breast cancer  Mother had breast cancer   • Statins Myalgia and Other (See Comments)     Joint pains  Joint pains  Joint pains       Social History     Tobacco Use   • Smoking status: Former Smoker     Packs/day: 0.00     Years: 2.00     Pack years: 0.00     Types: Cigarettes, Cigarettes     Quit date: 1985     Years since quittin.4   • Smokeless tobacco: Never Used   Vaping Use   • Vaping Use: Never used   Substance Use Topics   • Alcohol use: Yes     Comment: Not very often   • Drug use: No       Family History   Problem Relation Age of Onset   • Breast cancer Mother 69  "  • Breast cancer Maternal Aunt    • Kidney cancer Sister    • Ovarian cancer Neg Hx    • Colon cancer Neg Hx    • Deep vein thrombosis Neg Hx    • Pulmonary embolism Neg Hx    • Malig Hyperthermia Neg Hx        Review of Systems   Constitutional: Positive for activity change. Negative for appetite change, fatigue, fever and unexpected weight change.   Respiratory: Negative for cough and shortness of breath.    Cardiovascular: Negative for chest pain and palpitations.   Gastrointestinal: Negative for abdominal distention, abdominal pain, constipation, diarrhea and nausea.   Endocrine: Negative for cold intolerance and heat intolerance.   Genitourinary: Positive for dyspareunia. Negative for dysuria, menstrual problem, pelvic pain, vaginal bleeding, vaginal discharge and vaginal pain.   Skin: Negative for color change and rash.   Neurological: Negative for headaches.   Psychiatric/Behavioral: Negative for dysphoric mood. The patient is not nervous/anxious.    All other systems reviewed and are negative.      /80   Ht 160 cm (62.99\")   Wt 82.6 kg (182 lb)   LMP  (LMP Unknown)   BMI 32.25 kg/m²     Physical Exam   Constitutional: She is oriented to person, place, and time. She appears well-developed.   HENT:   Head: Normocephalic and atraumatic.   Eyes: Conjunctivae are normal. No scleral icterus.   Neck: No thyromegaly present.   Cardiovascular: Normal rate and regular rhythm.   Pulmonary/Chest: Effort normal and breath sounds normal. Right breast exhibits no inverted nipple, no mass, no nipple discharge, no skin change and no tenderness. Left breast exhibits no inverted nipple, no mass, no nipple discharge, no skin change and no tenderness.   Abdominal: Soft. Normal appearance and bowel sounds are normal. She exhibits no distension and no mass. There is no abdominal tenderness. There is no rebound and no guarding. No hernia.   S/P abdominoplasty   Genitourinary:    Rectum normal.      Pelvic exam was " performed with patient supine.   There is no rash, tenderness or lesion on the right labia. There is no rash, tenderness or lesion on the left labia. Right adnexum displays no mass, no tenderness and no fullness. Left adnexum displays no mass, no tenderness and no fullness.    No vaginal discharge, erythema, tenderness or bleeding.   No erythema, tenderness or bleeding in the vagina.    No foreign body in the vagina.      No signs of injury in the vagina.      Genitourinary Comments: Cystocele noted  Uterus and cervix absent  Normal cuff   no pain reproducible on exam     Neurological: She is alert and oriented to person, place, and time.   Skin: Skin is warm and dry.   Psychiatric: Her behavior is normal. Mood, judgment and thought content normal.   Nursing note and vitals reviewed.         Assessment/Plan    1) GYN HM: check pap/HPV. This is her first 6 month repeat pap s/p TLH.  SBE demonstrated and encouraged.  2) STD screening: declines.Condoms encouraged.  3) Bone health - Weight bearing exercise, dietary calcium recommendations and vitamin D reviewed.   4) Diet and Exercise discussed  5) Smoking Status: No   6) Social: no issues  7)MMG:  UTD 11/2021, BI-RADS 1.  Schedule MMG 11/2022  8) DEXA-UTD 10/2019-normal DEXA.  Repeat in 11/2022  9)C scope-scheduled for 9/2022  10)Dyspareunia- not reproducible on exam. Pt declines US. RTO if pain recurs for US  11)Parts of this document have been copied or forwarded from her previous visits and have been reviewed, updated and edited as indicated.   12)I saw the patient with a face mask, gloves and eye protection  The patient herself was masked.  Social distancing was observed as appropriate.  13)Follow up repeat pap in 6 months       Diagnoses and all orders for this visit:    1. Pap smear, low-risk (Primary)  -     POC Urinalysis Dipstick  -     IgP, Aptima HPV    2. Special screening examination for human papillomavirus (HPV)  -     POC Urinalysis Dipstick  -     IgP,  Aptima HPV    3. Encounter for screening mammogram for malignant neoplasm of breast  -     Mammo Screening Bilateral With CAD; Future    4. Screening for osteoporosis  -     DEXA Bone Density Axial; Future    5. H/O abnormal cervical Papanicolaou smear    6. Female dyspareunia        Queta Brandt MD  6/23/2022  13:34 EDT

## 2022-06-26 DIAGNOSIS — N95.9 MENOPAUSAL DISORDER: ICD-10-CM

## 2022-06-26 DIAGNOSIS — M25.50 ARTHRALGIA OF MULTIPLE JOINTS: ICD-10-CM

## 2022-06-27 RX ORDER — CELECOXIB 200 MG/1
CAPSULE ORAL
Qty: 90 CAPSULE | Refills: 1 | Status: SHIPPED | OUTPATIENT
Start: 2022-06-27 | End: 2023-01-20

## 2022-06-27 RX ORDER — VENLAFAXINE HYDROCHLORIDE 75 MG/1
CAPSULE, EXTENDED RELEASE ORAL
Qty: 90 CAPSULE | Refills: 1 | Status: SHIPPED | OUTPATIENT
Start: 2022-06-27 | End: 2023-01-20

## 2022-06-27 NOTE — TELEPHONE ENCOUNTER
Rx Refill Note  Requested Prescriptions     Pending Prescriptions Disp Refills   • venlafaxine XR (EFFEXOR-XR) 75 MG 24 hr capsule [Pharmacy Med Name: VENLAFAXINE HCL ER 75 MG CAP] 90 capsule 1     Sig: TAKE ONE CAPSULE BY MOUTH DAILY   • celecoxib (CeleBREX) 200 MG capsule [Pharmacy Med Name: CELECOXIB 200 MG CAPSULE] 90 capsule 1     Sig: TAKE ONE CAPSULE BY MOUTH DAILY      Last office visit with prescribing clinician: 2/14/2022      Next office visit with prescribing clinician: 2/15/2023            Sonia Garcia MA  06/27/22, 10:54 EDT

## 2022-06-29 LAB
CYTOLOGIST CVX/VAG CYTO: ABNORMAL
CYTOLOGY CVX/VAG DOC CYTO: ABNORMAL
CYTOLOGY CVX/VAG DOC THIN PREP: ABNORMAL
DX ICD CODE: ABNORMAL
DX ICD CODE: ABNORMAL
HIV 1 & 2 AB SER-IMP: ABNORMAL
HPV I/H RISK 4 DNA CVX QL PROBE+SIG AMP: NEGATIVE
OTHER STN SPEC: ABNORMAL
PATHOLOGIST CVX/VAG CYTO: ABNORMAL
RECOM F/U CVX/VAG CYTO: ABNORMAL
STAT OF ADQ CVX/VAG CYTO-IMP: ABNORMAL

## 2022-07-29 ENCOUNTER — TELEPHONE (OUTPATIENT)
Dept: PLASTIC SURGERY | Facility: CLINIC | Age: 62
End: 2022-07-29

## 2022-07-29 ENCOUNTER — OFFICE VISIT (OUTPATIENT)
Dept: INTERNAL MEDICINE | Facility: CLINIC | Age: 62
End: 2022-07-29

## 2022-07-29 VITALS
TEMPERATURE: 97.7 F | SYSTOLIC BLOOD PRESSURE: 128 MMHG | DIASTOLIC BLOOD PRESSURE: 88 MMHG | OXYGEN SATURATION: 98 % | WEIGHT: 180.4 LBS | HEART RATE: 103 BPM | HEIGHT: 63 IN | BODY MASS INDEX: 31.96 KG/M2

## 2022-07-29 DIAGNOSIS — E03.9 ADULT HYPOTHYROIDISM: ICD-10-CM

## 2022-07-29 DIAGNOSIS — N62 LARGE BREASTS: ICD-10-CM

## 2022-07-29 DIAGNOSIS — Z79.899 DRUG THERAPY: Primary | ICD-10-CM

## 2022-07-29 DIAGNOSIS — Z79.899 DRUG THERAPY: ICD-10-CM

## 2022-07-29 DIAGNOSIS — M54.2 CHRONIC NECK PAIN: ICD-10-CM

## 2022-07-29 DIAGNOSIS — M25.50 ARTHRALGIA OF MULTIPLE JOINTS: ICD-10-CM

## 2022-07-29 DIAGNOSIS — G89.29 CHRONIC NECK PAIN: ICD-10-CM

## 2022-07-29 DIAGNOSIS — E78.2 MIXED HYPERLIPIDEMIA: Primary | ICD-10-CM

## 2022-07-29 PROCEDURE — 99214 OFFICE O/P EST MOD 30 MIN: CPT | Performed by: NURSE PRACTITIONER

## 2022-07-29 RX ORDER — TRAMADOL HYDROCHLORIDE 100 MG/1
100 TABLET, EXTENDED RELEASE ORAL DAILY
Qty: 30 TABLET | Refills: 2 | Status: SHIPPED | OUTPATIENT
Start: 2022-07-29

## 2022-07-29 NOTE — PROGRESS NOTES
Chief Complaint   Patient presents with   • Med Refill     Tramadol, also wants to discuss breast reduction       Subjective     Cecily Broussard is a 62 y.o. female being seen for a follow up appointment today regarding Hypothyroidism, hyperlpidemia, menopausal symptoms, and OA of multiple sites. She is on Zetia for hyperlipidemia. She is statin intolerant. She walks 12, 000 steps a day.      She is taking Celebrex 200 mg daily for OA. She has chronic joint pains, mostly in neck and shoulders. Described as stiffness and aching pain. She uses Ultram as needed for severe breakthru pain, usually QOD. Daily OA pain ranges for 4-6 of 10. Her bra is cutting into her shoulder s and her large breasts are contributing to pains, 3 female family members have needed breast reductions. She has been to chiro and tried losing weight.      She takes Effexor Xr for Menopausal symptoms. She denies any hot flashes, Chest pains, SOA. She had a cervical conization 11-. She had a partial hysterectomy 10-.     She is on Levothyroxine 100mcgs daily for hypothyroidism. She denies changes in hair/skin/nails.        Answers for HPI/ROS submitted by the patient on 7/27/2022  Please describe your symptoms.: Tramadol and discuss breast reduction  Have you had these symptoms before?: No  How long have you been having these symptoms?: Greater than 2 weeks  Please list any medications you are currently taking for this condition.: Advil  Please describe any probable cause for these symptoms. : Large breast  causing  shoulder neck pain and headaches  What is the primary reason for your visit?: Other      History of Present Illness     Allergies   Allergen Reactions   • Estrogens Other (See Comments)     Mother had breast cancer  Mother had breast cancer  Mother had breast cancer   • Statins Myalgia and Other (See Comments)     Joint pains  Joint pains  Joint pains         Current Outpatient Medications:   •  celecoxib (CeleBREX) 200 MG  capsule, TAKE ONE CAPSULE BY MOUTH DAILY, Disp: 90 capsule, Rfl: 1  •  Cholecalciferol (VITAMIN D) 2000 UNITS capsule, Take 1 capsule by mouth Daily., Disp: 30 capsule, Rfl: 11  •  ezetimibe (ZETIA) 10 MG tablet, Take 1 tablet by mouth Daily., Disp: 90 tablet, Rfl: 2  •  levothyroxine (SYNTHROID, LEVOTHROID) 100 MCG tablet, TAKE ONE TABLET BY MOUTH DAILY, Disp: 90 tablet, Rfl: 3  •  traMADol ER (ULTRAM-ER) 100 MG 24 hr tablet, TAKE ONE TABLET BY MOUTH DAILY, Disp: 30 tablet, Rfl: 0  •  venlafaxine XR (EFFEXOR-XR) 75 MG 24 hr capsule, TAKE ONE CAPSULE BY MOUTH DAILY, Disp: 90 capsule, Rfl: 1    The following portions of the patient's history were reviewed and updated as appropriate: allergies, current medications, past family history, past medical history, past social history, past surgical history and problem list.    Review of Systems   HENT: Negative.    Eyes: Negative.    Respiratory: Negative.  Negative for wheezing and stridor.    Cardiovascular: Negative.  Negative for chest pain and leg swelling.   Gastrointestinal: Negative.    Endocrine: Negative.    Genitourinary: Negative.    Musculoskeletal: Positive for arthralgias and back pain.   Skin: Negative.    Allergic/Immunologic: Negative.  Negative for environmental allergies and food allergies.   Neurological: Negative.    Hematological: Negative.    Psychiatric/Behavioral: Negative.        Assessment     Physical Exam  Constitutional:       Appearance: Normal appearance. She is obese.   HENT:      Head: Normocephalic.      Right Ear: Tympanic membrane normal.      Left Ear: Tympanic membrane normal.   Cardiovascular:      Rate and Rhythm: Normal rate and regular rhythm.      Pulses: Normal pulses.      Heart sounds: Normal heart sounds.   Pulmonary:      Effort: Pulmonary effort is normal. No respiratory distress.      Breath sounds: Normal breath sounds.   Musculoskeletal:      Cervical back: Neck supple. No rigidity. Pain with movement (C4-5 and bilateral  trapezius) present. Normal range of motion.   Skin:     General: Skin is warm and dry.   Neurological:      General: No focal deficit present.      Mental Status: She is alert and oriented to person, place, and time.   Psychiatric:         Mood and Affect: Mood normal.         Behavior: Behavior normal.         Thought Content: Thought content normal.         Plan     Her fasting labs were reviewed with the patient from last week.     Diagnoses and all orders for this visit:    1. Mixed hyperlipidemia (Primary)  -     Lipid Panel With / Chol / HDL Ratio  -     Comprehensive Metabolic Panel    2. Adult hypothyroidism  -     TSH  -     T4, Free    3. Chronic neck pain  -     Urine Drug Screen - Urine, Clean Catch; Future  -     traMADol ER (ULTRAM-ER) 100 MG 24 hr tablet; Take 1 tablet by mouth Daily.  Dispense: 30 tablet; Refill: 2  -     Ambulatory Referral to Physical Therapy Evaluate and treat    4. Large breasts  -     Ambulatory Referral to Plastic Surgery    5. Drug therapy  -     Urine Drug Screen - Urine, Clean Catch; Future    6. Arthralgia of multiple joints        I feel like she would benefit from breast reduction surgery, referral placed.    On Zetia for hyperlipidemia, LDL goal < 100.    Will check thyroid levels today for levothyroxine dosing    The patient has read and signed the [facility] Controlled Substance Contract.  I will continue to see patient for regular follow up appointments.  They are well controlled on their medication.  MANDY is updated every 3 months. The patient is aware of the potential for addiction and dependence.    Follow up with a CPE in 6 months

## 2022-07-29 NOTE — TELEPHONE ENCOUNTER
What is your current bra size: E AND H DEPENDING ON THE KIND OF BRA  Has your bra cup size been stable for at least the last 6 months: [x]Yes []No    Have you had treatment for upper back, neck, and/or shoulder pain: [x]Yes []No     What treatment:  Chiropractor, PT, AND PAIN MEDS   Do you have a rash and/or moisture: [x]Yes []No   Are you using a Rx cream: []Yes [x]No     What Cream and who prescribed it:WILL ASK MD FOR IT   Do you have shoulder Grooving: [x]Yes []No    Do you wear multiple bras: []Yes [x]No    Any trouble with activities of daily living: []Yes [x]No      What activities:    Any trouble exercising: []Yes [x]No     What exercises:   Nicotine use    Do you smoke: []Yes [x]No     Do you Vape: []Yes [x]No     Contain Nicotine: []Yes [x]No   Do you use smokeless tobacco products: []Yes [x]No

## 2022-07-30 LAB
ALBUMIN SERPL-MCNC: 4.2 G/DL (ref 3.8–4.8)
ALBUMIN/GLOB SERPL: 1.6 {RATIO} (ref 1.2–2.2)
ALP SERPL-CCNC: 155 IU/L (ref 44–121)
ALT SERPL-CCNC: 30 IU/L (ref 0–32)
AMPHETAMINES UR QL SCN: NEGATIVE NG/ML
AST SERPL-CCNC: 42 IU/L (ref 0–40)
BARBITURATES UR QL SCN: NEGATIVE NG/ML
BENZODIAZ UR QL SCN: NEGATIVE NG/ML
BILIRUB SERPL-MCNC: 0.3 MG/DL (ref 0–1.2)
BUN SERPL-MCNC: 12 MG/DL (ref 8–27)
BUN/CREAT SERPL: 17 (ref 12–28)
BZE UR QL SCN: NEGATIVE NG/ML
CALCIUM SERPL-MCNC: 8.9 MG/DL (ref 8.7–10.3)
CANNABINOIDS UR QL SCN: NEGATIVE NG/ML
CHLORIDE SERPL-SCNC: 105 MMOL/L (ref 96–106)
CHOLEST SERPL-MCNC: 183 MG/DL (ref 100–199)
CHOLEST/HDLC SERPL: 3.3 RATIO (ref 0–4.4)
CO2 SERPL-SCNC: 25 MMOL/L (ref 20–29)
CREAT SERPL-MCNC: 0.72 MG/DL (ref 0.57–1)
CREAT UR-MCNC: 324.7 MG/DL (ref 20–300)
EGFRCR SERPLBLD CKD-EPI 2021: 94 ML/MIN/1.73
GLOBULIN SER CALC-MCNC: 2.7 G/DL (ref 1.5–4.5)
GLUCOSE SERPL-MCNC: 99 MG/DL (ref 65–99)
HDLC SERPL-MCNC: 56 MG/DL
LABORATORY COMMENT REPORT: ABNORMAL
LDLC SERPL CALC-MCNC: 112 MG/DL (ref 0–99)
METHADONE UR QL SCN: NEGATIVE NG/ML
OPIATES UR QL SCN: NEGATIVE NG/ML
OXYCODONE+OXYMORPHONE UR QL SCN: NEGATIVE NG/ML
PCP UR QL: NEGATIVE NG/ML
PH UR: 5.5 [PH] (ref 4.5–8.9)
POTASSIUM SERPL-SCNC: 4.4 MMOL/L (ref 3.5–5.2)
PROPOXYPH UR QL SCN: NEGATIVE NG/ML
PROT SERPL-MCNC: 6.9 G/DL (ref 6–8.5)
SODIUM SERPL-SCNC: 142 MMOL/L (ref 134–144)
T4 FREE SERPL-MCNC: 1.45 NG/DL (ref 0.82–1.77)
TRIGL SERPL-MCNC: 80 MG/DL (ref 0–149)
TSH SERPL DL<=0.005 MIU/L-ACNC: 0.11 UIU/ML (ref 0.45–4.5)
VLDLC SERPL CALC-MCNC: 15 MG/DL (ref 5–40)

## 2022-08-02 ENCOUNTER — TELEPHONE (OUTPATIENT)
Dept: INTERNAL MEDICINE | Facility: CLINIC | Age: 62
End: 2022-08-02

## 2022-08-02 DIAGNOSIS — E03.9 ADULT HYPOTHYROIDISM: Primary | ICD-10-CM

## 2022-08-02 RX ORDER — LEVOTHYROXINE SODIUM 0.07 MG/1
75 TABLET ORAL
Qty: 30 TABLET | Refills: 1 | Status: SHIPPED | OUTPATIENT
Start: 2022-08-02 | End: 2022-12-30 | Stop reason: SDUPTHER

## 2022-08-02 NOTE — TELEPHONE ENCOUNTER
Okay for hub to share with pt,  Labs are showing an improvement in total cholesterol, total from 214 to 183. Levothyroxine dose too high, reduce to 75mcgs daily and repeat TSH and free T4. Pt needs lab appt.

## 2022-08-12 ENCOUNTER — TELEPHONE (OUTPATIENT)
Dept: PLASTIC SURGERY | Facility: CLINIC | Age: 62
End: 2022-08-12

## 2022-08-12 NOTE — TELEPHONE ENCOUNTER
Called and left patient a message to call me back to go over BBR requirements for insurance to approve. Awaiting on a phone call to discuss.

## 2022-08-15 ENCOUNTER — TELEPHONE (OUTPATIENT)
Dept: PLASTIC SURGERY | Facility: CLINIC | Age: 62
End: 2022-08-15

## 2022-08-15 ENCOUNTER — TELEPHONE (OUTPATIENT)
Dept: INTERNAL MEDICINE | Facility: CLINIC | Age: 62
End: 2022-08-15

## 2022-08-15 NOTE — TELEPHONE ENCOUNTER
PATIENT IS HAVING A BREAST REDUCTION CONSULTATION ON AUG 30TH THAT WE REFERRED HER TOO. HOWEVER THE SURGEON RECOMMENDS PATIENT TO REQUEST [facility]  TO PRESCRIBE A SAVE  FOR RAWNESS, CHAFFING, MOISTURE ETC UNDER THE BREAST                                                                                                                  [facility] [place] [id number] - [place] - [address] - [phone]  - [phone] FX  [phone]                                                                                                   PATIENT> [phone]

## 2022-08-15 NOTE — TELEPHONE ENCOUNTER
What is your current bra size: Has your bra cup size been stable for at least the last 6 months: [x]Yes []No    Have you had treatment for upper back, neck, and/or shoulder pain: [x]Yes []No     What treatment: (Physical therapy, chiropractor, massage therapy, medications,   Etc.)PHYSICAL THERAPY   Do you have a rash and/or moisture: [x]Yes []No   Are you using a Rx cream: []Yes [x]No (If no prescribed cream, refer to PCP to have something prescribed.)    What Cream and who prescribed it:   Do you have shoulder Grooving: [x]Yes []No    Do you wear multiple bras: []Yes [x]No    Any trouble with activities of daily living: [x]Yes []No      What activities:    Any trouble exercising: [x]Yes []No     What exercises:  Nicotine use    Do you smoke: []Yes [x]No     Do you Vape: []Yes [x]No     Contain Nicotine: []Yes [x]No   Do you use smokeless tobacco products: []Yes [x]No   If yes to any of the above, are you willing to quit: []Yes [x]No

## 2022-08-17 RX ORDER — NYSTATIN 100000 U/G
1 OINTMENT TOPICAL 2 TIMES DAILY
Qty: 30 G | Refills: 0 | Status: SHIPPED | OUTPATIENT
Start: 2022-08-17

## 2022-08-23 NOTE — PROGRESS NOTES
"Chief Complaint  Consult (Breast Reduction /)    Subjective          History of Present Illness  Cecily Broussard is a 62 y.o. female who presents to [facility] as a consult from PCP and would like to discuss breast reduction.  Her current bra size is a 38, DDD cup.  She would like to be a D cup.  She patient does have a family history of breast cancer (her mother).  Neck, shoulders, and upper back pain present for 3-4 years.  Conservative treatments of chiropractor, physical therapy, headaches treated with tramadol with little or temporary relief.  Experiences rashes and moisture, treats with nystatin cream that was presribed. Trouble sleeping, cannot get comfortable.  Trouble exercising, cannot do activities that she would like to do, generally has to wear many sports bras and has to special order bras.  Recommendations for today of breast reduction.  No history of skin infections. Mammogram is due in November. Last mammogram 11/29/21. Came back benign.  Mother had (hormonal) breast cancer.     Has excess skin underneath bilateral arms. Self conscious and can't fit in her clothes right. Would like quote for cosmetic brachioplasty.     Allergies: Estrogens and Statins  Allergies Reconciled.    Review of Systems  All system were reviewed and were negative, except the ones noted above.     Review of Systems     Objective     BP (!) 143/114 (BP Location: Right arm, Patient Position: Sitting)   Pulse 91   Temp 97.1 °F (36.2 °C)   Ht 160 cm (62.99\")   Wt 82 kg (180 lb 12.8 oz)   SpO2 97%   BMI 32.04 kg/m²     Body mass index is 32.04 kg/m².    Physical Exam      Patient awake, alert, oriented  Respirations are non elaborated  Patient is not tachycardic    Breast exam:   Masses: No masses  Nipple Discharge: No nipple discharge  Breast Symmetry:  Axillary Lymphadenopathy: No axillary lymphadenopathy  SN-N (Right Breast): 33  SN-N (Left Breast): 35  SN-IMF (Right " Breast): 12  SN-IMF (Left Breast): 10  N-IMF (Right Breast):  N-IMF (Left Breast):  Base Width bilaterally: 14    Left breast is larger than right. Deep moderate shoulder grooving. Slight moisture under breast.     Schnur Scale Score: 482  Body surface area is 1.85 meters squared.      Result Review :{Labs  Result Review  Imaging  Med Tab  Media :23}           PROCEDURE:  SPLIT-RECON- BILATERAL BREAST REDUCTION- 2HR WITH BRACHIOPLASTY 2HR.  NEEDS QUOTE.  Will let us know if she wants to proceed with both.     Assessment and Plan      Diagnoses and all orders for this visit:    1. Macromastia (Primary)    2. Excessive and redundant skin and subcutaneous tissue           Plan:  The patient was given literature in regards to the procedure and encouraged to visit the websites of [employer] and [employer].  Pictures obtained.  We will have the patient send the report or undergo a pre-operative mammogram in November.  We discussed the procedure for bilateral breast reduction under general anesthesia as well as the postoperative recover time and the need for limitation of activities.  The risks of surgery were discussed including bleeding, infection, scarring (for which diagrams were drawn), pain, poor healing, loss of skin and or nipple, change in nipple sensation, inability to breast feed, asymmetry, no guarantee of post-operative cup size.    I think that this patient is an excellent candidate for a breast reduction.  If feel that this procedure is medically necessary to relieve her symptoms.  I would anticipate resecting at least 482 grams of breast tissue from each breast.  We will contact the insurance company for pre-authorization.    Discussed risk and benefits of brachioplasty including bleeding, infection, reoccurrence of excess tissue, possible asymmetry, and scarring. She verbalizes understanding that she will need to wear compression garment up to 6 months and will have a minimum of 4-6 weeks for recovery. Patient  wishes to proceed. Will submit to insurance and schedule for in-office excision. RTC for procedure.     She can obtain her quote and she verbalizes understanding she may proceed with breast reduction only and brachioplasty is an option, only if she desires.   Scribed by Gabby Solis MA, acting as a scribe for ESTHER Tolliver.  ESTHER Tolliver's signature on the note affirms that the note adequately documents the care provided.    This patient has my office number to call with any further questions.   OR: 3 hrs under general anesthesia    Consent: bilateral breast reduction and bilateral brachioplasty    CPT  03988-44- breast reduction;   [facility] Brachioplasty      I spent 60 minutes caring for Cecily on this date of service. This time includes time spent by me in the following activities:performing a medically appropriate examination and/or evaluation , counseling and educating the patient/family/caregiver, documenting information in the medical record, and care coordination. Patient is checking with insurance and will let us know if they go by Schnur scale.      Scribed by ESTHER Tolliver, acting as a scribe for ESTHER Tolliver.  ESTHER Tolliver's signature on the note affirms that the note adequately documents the care provided.        •           Follow Up     Patient was given instructions and counseling regarding her condition. Please see specific information pulled into the AVS if appropriate.     ESTHER Tolliver  08/30/2022

## 2022-08-24 ENCOUNTER — HOSPITAL ENCOUNTER (OUTPATIENT)
Dept: PHYSICAL THERAPY | Facility: HOSPITAL | Age: 62
Setting detail: THERAPIES SERIES
Discharge: HOME | End: 2022-08-24

## 2022-08-24 DIAGNOSIS — M54.2 CHRONIC NECK PAIN: Primary | ICD-10-CM

## 2022-08-24 DIAGNOSIS — G89.29 CHRONIC NECK PAIN: Primary | ICD-10-CM

## 2022-08-24 PROCEDURE — 97161 PT EVAL LOW COMPLEX 20 MIN: CPT

## 2022-08-24 NOTE — THERAPY EVALUATION
Outpatient Physical Therapy Ortho Initial Evaluation  [facility]     Patient Name: Cecily Broussard  : 1960  MRN: [id number]  Today's Date: 2022      Visit Date: 2022    Patient Active Problem List   Diagnosis   • Depression   • Hyperlipidemia   • Adult hypothyroidism   • Insomnia   • Low grade squamous intraepith lesion on cytologic smear cervix (lgsil)   • Menopausal disorder   • Arthralgia of multiple joints   • Osteoarthritis   • Positive anti-CCP test   • Vitamin D deficiency   • Healthcare maintenance   • History of colon polyps   • HPV (human papilloma virus) infection   • S/P conization of cervix   • Drug therapy   • Personal history of colonic polyps   • H/O abnormal cervical Papanicolaou smear   • Dysplasia of cervix, high grade REAGAN 2   • Large breasts   • Chronic neck pain        Past Medical History:   Diagnosis Date   • Abnormal Pap smear of cervix    • Arthritis    • Cervical dysplasia 2016    REAGAN 1on bx, CKC with REAGAN 2, neg margins   • Depression    • Disease of thyroid gland    • Hyperlipidemia    • Menopause         Past Surgical History:   Procedure Laterality Date   • ABDOMINOPLASTY     • CERVICAL CONIZATION N/A 2020    Procedure: CERVICAL CONIZATION;  Surgeon: Queta Brandt MD;  Location: [facility];  Service: Obstetrics/Gynecology;  Laterality: N/A;   •  SECTION      x 1   • COLONOSCOPY     • LAPAROSCOPIC ASSISTED VAGINAL HYSTERECTOMY SALPINGO OOPHORECTOMY     • OOPHORECTOMY      RSO   • TOTAL ABDOMINAL HYSTERECTOMY WITH SALPINGO OOPHORECTOMY     • TOTAL LAPAROSCOPIC HYSTERECTOMY Right 10/19/2021    Procedure: TOTAL LAPAROSCOPIC HYSTERECTOMY, right salpingo-oophorectomy;  Surgeon: Queta Brandt MD;  Location: [facility];  Service: Obstetrics/Gynecology;  Laterality: Right;   • TUBAL ABDOMINAL LIGATION         Visit Dx:     ICD-10-CM ICD-9-CM   1. Chronic neck pain  M54.2 723.1    G89.29 338.29          Patient History     Row  "Name 08/24/22 0900 08/23/22 0856          History    Chief Complaint Muscle tenderness;Pain;Tightness;Joint stiffness  -LN Muscle tenderness;Pain;Tightness (P)    -patient     Type of Pain Neck pain;Shoulder pain;Back pain;Other pain  between shoulder blades  -LN Back pain;Neck pain;Shoulder pain (P)    -patient     Date Current Problem(s) Began 11/06/19  -LN 11/06/19 (P)    -patient     Brief Description of Current Complaint Pt with hx of neck and shoulder pain & tension HA since 2019. \"I do have an appointment to see about getting  a breast reduction.\" This appointment is on 8/30. \"It comes and goes.\"  No injury reported. She reports no radiating sx in arms; pain in neck and goes down between her shoulder blades and into UT/shoulders. No testing done on neck.  -LN Neck shoulders pain (P)    -patient     Patient/Caregiver Goals Relieve pain;Improve mobility;Know what to do to help the symptoms  -LN Relieve pain (P)    -patient     Patient/Caregiver Goals Comment \"Help with pain\"  -LN --     Hand Dominance left-handed  -LN left-handed (P)    -patient     Occupation/sports/leisure activities does not work; likes to walk  -LN Walking (P)    -patient     Patient seeing anyone else for problem(s)? PCP  -LN No (P)    -patient     How has patient tried to help current problem? tramadol; rest  -LN --     Results of Clinical Tests no tests done  -LN --     Related/Recent Hospitalizations No  -LN --     Surgery/Hospitalization n/a  -LN --     History of Previous Related Injuries none reported  -LN --     Are you or can you be pregnant No  -LN No (P)    -patient            Pain     Pain Location Neck;Shoulder  B; and up behind ears/OA areas  -LN --     Pain at Present 7  -LN --     Pain at Best 3  -LN --     Pain at Worst 10  -LN --     Pain Frequency Intermittent  -LN --     Pain Description Throbbing;Tightness;Other (Comment)  tension  -LN --     What Performance Factors Make the Current Problem(s) WORSE? Driving; reading  " -LN --     What Performance Factors Make the Current Problem(s) BETTER? rest; tramadol  -LN --     Is your sleep disturbed? Yes  at times  -LN --     What position do you sleep in? Right sidelying;Left sidelying  -LN --     Difficulties at work? does not work  -LN --     Difficulties with ADL's? none reported  -LN --     Difficulties with recreational activities? none reported  -LN --            Fall Risk Assessment    Any falls in the past year: No  -LN No (P)    -patient            Services    Prior Rehab/Home Health Experiences No  -LN No (P)    -patient     Are you currently receiving Home Health services No  -LN No (P)    -patient     Do you plan to receive Home Health services in the near future No  -LN No (P)    -patient            Daily Activities    Primary Language English  -LN English (P)    -patient     Are you able to read Yes  -LN No (P)    -patient     Are you able to write Yes  -LN Yes (P)    -patient     How does patient learn best? Listening;Reading;Demonstration;Pictures/Video  -LN Listening;Reading;Demonstration;Pictures/Video (P)    -patient     Teaching needs identified Home Exercise Program;Management of Condition;Other (comment)  Risks and benefits of treatment explained to patient.  -LN --     Patient is concerned about/has problems with Flexibility;Performing home management (household chores, shopping, care of dependents);Reaching over head;Repetitive movements of the hand, arm, shoulder  -LN --     Does patient have problems with the following? Depression  -LN --     Barriers to learning None  -LN --     Pt Participated in POC and Goals Yes  -LN --            Safety    Are you being hurt, hit, or frightened by anyone at home or in your life? No  checked no on paperwork  -LN Yes (P)    -patient     Are you being neglected by a caregiver No  -LN Yes (P)    -patient     Have you had any of the following issues with Depression  -LN Depression (P)    -patient           User Key  (r) = Recorded  "By, (t) = Taken By, (c) = Cosigned By    Initials Name Provider Type    LN Laura Pate, PT Physical Therapist    patient Cecily Broussard --                 PT Ortho     Row Name 08/24/22 1000       Subjective Comments    Subjective Comments Pt c/o pain that comes and goes in B neck/UT/midscapula area with tension at B OA area and radiates behind her ears. She reports tension HA that come and go. She does take tramadol when the pain gets really bad; has not used any ice or heat. \"I feel like if I could get a massage and my head pulled up every night that would help me a lot.\" No c/o any radiating sx in arms. \"My left side seems to hurt more than my right; maybe because I am left handed.\"  -LN       Precautions and Contraindications    Precautions/Limitations no known precautions/limitations  -LN       Subjective Pain    Able to rate subjective pain? yes  -LN    Pre-Treatment Pain Level 7  -LN    Subjective Pain Comment pain was not rated post-Rx but she reported feeling better manish on right side and reported that she felt her right ear open up.  -LN       Posture/Observations    Forward Head Mild  -LN    Cervical Lordosis Mild;Increased;Sitting posture  -LN    Thoracic Kyphosis Mild;Increased;Sitting posture  -LN    Rounded Shoulders Bilateral:;Mild;Moderate  -LN       Cervical Palpation    Occiput Bilateral:;Tender;Guarded/taut  L.R  -LN    Suboccipital Bilateral:;Tender;Guarded/taut  L>R  -LN    Scalenes Bilateral:;Tender;Guarded/taut  -LN    Spinous Process Tender  manish T3-T6 levels  -LN    Levator Scapula Bilateral:;Tender;Guarded/taut  -LN    Upper Traps Bilateral:;Tender;Guarded/taut  -LN    Middle Traps Bilateral:;Tender;Guarded/taut  -LN       Cervical/Thoracic Special Tests    Cervical Compression (Forarminal Compression vs. Facet Pain) Negative  -LN    Cervical Distraction (Foraminal Compression vs. Facet Pain) Negative  -LN       General ROM    GENERAL ROM COMMENTS B UE AROM WFL-WNL  -LN       " Head/Neck/Trunk    Neck Extension AROM WFL- mild OA discomfort  -LN    Neck Flexion AROM 75%- tends to flex to R- c/o pain on left side  -LN    Neck Lt Lateral Flexion AROM 25%- neck pain and tightness  -LN    Neck Rt Lateral Flexion AROM 25%- neck pain and tightness  -LN    Neck Lt Rotation AROM 50%  -LN    Neck Rt Rotation AROM 25%  -LN       MMT Right Upper Ext    Rt Shoulder Flexion MMT, Gross Movement (5/5) normal  -LN    Rt Shoulder Extension MMT, Gross Movement (5/5) normal  -LN    Rt Shoulder ABduction MMT, Gross Movement (5/5) normal  -LN    Rt Shoulder ADduction MMT, Gross Movement (5/5) normal  -LN    Rt Shoulder Internal Rotation MMT, Gross Movement (5/5) normal  -LN    Rt Shoulder External Rotation MMT, Gross Movement (5/5) normal  -LN       MMT Left Upper Ext    Lt Shoulder Flexion MMT, Gross Movement (5/5) normal  -LN    Lt Shoulder Extension MMT, Gross Movement (5/5) normal  -LN    Lt Shoulder ABduction MMT, Gross Movement (5/5) normal  -LN    Lt Shoulder ADduction MMT, Gross Movement (5/5) normal  -LN    Lt Shoulder Internal Rotation MMT, Gross Movement (5/5) normal  -LN    Lt Shoulder External Rotation MMT, Gross Movement (5/5) normal  -LN       Sensation    Sensation WNL? WNL  -LN    Light Touch No apparent deficits  -LN    Additional Comments no c/o any radiating arm sx  -LN       Upper Extremity Flexibility    Suboccipitals Right:;Mildly limited;Left:;Moderately limited  -LN    Scalenes Bilateral:;Moderately limited  -LN    Upper Trapezius Bilateral:;Moderately limited  -LN    Levator Scapula Bilateral:;Moderately limited  -LN    Pect Minor Bilateral:;Mildly limited;Moderately limited  -LN       Gait/Stairs (Locomotion)    Comment, (Gait/Stairs) Pt independent with all functional mobility and gait; no AD used.  -LN          User Key  (r) = Recorded By, (t) = Taken By, (c) = Cosigned By    Initials Name Provider Type    Laura Cannon, PT Physical Therapist                             Therapy Education  Education Details: Pt to work on HEP 2 x day as tolerated and to use MH PRN.  Given: HEP, Symptoms/condition management, Pain management, Posture/body mechanics  Program: New  How Provided: Verbal, Demonstration, Written  Provided to: Patient  Level of Understanding: Teach back education performed, Verbalized, Demonstrated      PT OP Goals     Row Name 08/24/22 1000          PT Short Term Goals    STG Date to Achieve 09/07/22  -LN     STG 1 Patient to verbally report decreased neck pain to <6/10 with ADLs & everyday activities.  -LN     STG 2 Pt independent with initial HEP issued by therapist.  -LN     STG 3 CROM improved by 25% to allow her to move her neck better with driving.  -LN     STG 4 Pt to have decreased muscle guarding B cervical PS/scalenes/levator/UT/MT to minimal.  -LN     STG 5 Pt to have decreased c/o tension HA by 25%-50%.  -LN            Long Term Goals    LTG Date to Achieve 09/21/22  -LN     LTG 1 Patient to verbally report decreased neck pain to <4/10 with ADLs & everyday activities.  -LN     LTG 2 Pt independent with more advanced HEP issued by therapist.  -LN     LTG 3 CROM WFL and painfree all planes.  -LN     LTG 4 Pt to have decreased muscle guarding B cervical PS/scalenes/levator/UT/MT to nominal.  -LN     LTG 5 Pt to have decreased c/o tension HA by 50%-75%.  -LN     LTG 6 Pt able to read for 30 minutes with no c/o any increased neck pain or tension HA.  -LN            Time Calculation    PT Goal Re-Cert Due Date 09/21/22  -LN           User Key  (r) = Recorded By, (t) = Taken By, (c) = Cosigned By    Initials Name Provider Type    LN Laura Pate, PT Physical Therapist                 PT Assessment/Plan     Row Name 08/24/22 1000          PT Assessment    Functional Limitations Limitation in home management;Limitations in community activities;Limitations in functional capacity and performance  -LN     Impairments Impaired flexibility;Joint  mobility;Muscle strength;Pain;Posture;Range of motion  -LN     Assessment Comments Patient presents with 3 year hx of B neck pain with pain radiating into B UT and into B midscapular areas with min to mod muscle guarding and tenderness and c/o intermittent tension HA (posterior); decreased CROM causing pain with driving; tightness B OA area, L>R, which causes discomfort behind her ears.  Pt with signs of cervical DDD/DJD and also has a very large chest, which I feel contributes to her pain and feel she would benefit from a breast reduction to help decrease the stress on her neck and midback.  -LN     Please refer to paper survey for additional self-reported information Yes  -LN     Rehab Potential Good  -LN     Patient/caregiver participated in establishment of treatment plan and goals Yes  -LN     Patient would benefit from skilled therapy intervention Yes  -LN            PT Plan    PT Frequency 2x/week  -LN     Predicted Duration of Therapy Intervention (PT) 4 weeks  -LN     Planned CPT's? PT EVAL LOW COMPLEXITY: 25820;PT THER PROC EA 15 MIN: 87285;PT MANUAL THERAPY EA 15 MIN: 91137;PT HOT OR COLD PACK TREAT MCARE;PT ELECTRICAL STIM UNATTEND: ;PT ULTRASOUND EA 15 MIN: 64308;PT TRACTION CERVICAL: 34084  -LN     Physical Therapy Interventions (Optional Details) home exercise program;manual therapy techniques;modalities;patient/family education;postural re-education;ROM (Range of Motion);strengthening;stretching;taping;dry needling  -LN     PT Plan Comments See patient 2 x week for therapeutic exercises with HEP; modalities PRN (US/IFC/MH/CP); STM/MFR PRN; manual therapy/gentle manual cervical traction as tolerated and consider trial of mechanical CTX as indicated. Pt and posture education; kinesiotape PRN. Consider trial of dry needling.  -LN           User Key  (r) = Recorded By, (t) = Taken By, (c) = Cosigned By    Initials Name Provider Type    Laura Cannon, PT Physical Therapist           "       Modalities     Row Name 08/24/22 1000             Precautions    Existing Precautions/Restrictions no known precautions/restrictions  -LN              Ultrasound 72115    Location B cervical PS/scalenes/OA area/UT/MT/rhomboids with pt seated in chair.  -LN      Duty Cycle 100  -LN      Frequency 1.0 MHz  -LN      Intensity - Wts/cm 1.5  -LN      89060 - PT Ultrasound Minutes 8  -LN            User Key  (r) = Recorded By, (t) = Taken By, (c) = Cosigned By    Initials Name Provider Type    LN Laura Pate, PT Physical Therapist               OP Exercises     Row Name 08/24/22 1000             Precautions    Existing Precautions/Restrictions no known precautions/restrictions  -LN              Subjective Comments    Subjective Comments Pt c/o pain that comes and goes in B neck/UT/midscapula area with tension at B OA area and radiates behind her ears. She reports tension HA that come and go. She does take tramadol when the pain gets really bad; has not used any ice or heat. \"I feel like if I could get a massage and my head pulled up every night that would help me a lot.\" No c/o any radiating sx in arms. \"My left side seems to hurt more than my right; maybe because I am left handed.\"  -LN              Subjective Pain    Able to rate subjective pain? yes  -LN      Pre-Treatment Pain Level 7  -LN      Subjective Pain Comment pain was not rated post-Rx but she reported feeling better manish on right side and reported that she felt her right ear open up.  -LN              Exercise 1    Exercise Name 1 supine chin tucks  -LN      Cueing 1 Verbal;Tactile;Demo  -LN      Reps 1 5  -LN      Time 1 5 sec  -LN              Exercise 2    Exercise Name 2 supine cervical rotation  -LN      Cueing 2 Verbal;Tactile;Demo  -LN      Reps 2 5 each  -LN      Time 2 2-3 sec  -LN            User Key  (r) = Recorded By, (t) = Taken By, (c) = Cosigned By    Initials Name Provider Type    LN Lauar Pate, PT Physical " Therapist              Manual Rx (last 36 hours)     Manual Treatments     Row Name 08/24/22 1000             Manual Rx 1    Manual Rx 1 Location STM to B cervical PS/scalenes/UT/MT after US with pt seated.  -LN      Manual Rx 1 Duration 6 minutes  -LN              Manual Rx 2    Manual Rx 2 Location supine SOR  -LN      Manual Rx 2 Duration 4 minutes; 3 minutes  -LN              Manual Rx 3    Manual Rx 3 Location supine gentle manual cervical traction  -LN      Manual Rx 3 Duration 10 x 10-20 seconds  -LN              Manual Rx 4    Manual Rx 4 Location supine cervical passive cervical rotation  -LN      Manual Rx 4 Duration 3 x 5 seconds each  -LN              Manual Rx 5    Manual Rx 5 Location supine gentle passive levator scapulae stretch  -LN      Manual Rx 5 Duration 3 x 5 seconds each  -LN              Manual Rx 6    Manual Rx 6 Location supine gentle passive UT stretch  -LN      Manual Rx 6 Duration 3 x 10 sec each  -LN              Manual Rx 7    Manual Rx 7 Location supine gentle passive cervical flexion  -LN      Manual Rx 7 Duration 3 x 5 seconds  -LN            User Key  (r) = Recorded By, (t) = Taken By, (c) = Cosigned By    Initials Name Provider Type    Laura Cannon, PT Physical Therapist                            Outcome Measure Options: Neck Disability Index (NDI)  Neck Disability Index  Section 1 - Pain Intensity: The pain is moderate and does not vary much.  Section 2 - Personal Care: I can look after myself normally without causing extra pain.  Section 3 - Lifting: I can lift heavy weights, but it causes extra pain.  Section 4 - Work: I can do as much work as I want.  Section 5 - Headaches: I have moderate headaches that come infrequently.  Section 6 - Concentration: I can concentrate fully when I want to without difficulty.  Section 7 - Sleeping: My sleep is slightly disturbed (less than 1 hour sleepless).  Section 8 - Driving: I can drive as long as I want with slight neck  pain.  Section 9 - Reading: I can read as much as I want with moderate neck pain.  Section 10 - Recreation: I am able to engage in all recreational activities with some pain in my neck.  Neck Disability Index Score: 11      Time Calculation:     Start Time: 1005  Stop Time: 1105  Time Calculation (min): 60 min  Timed Charges  20126 - PT Ultrasound Minutes: 8  Total Minutes  Timed Charges Total Minutes: 8   Total Minutes: 8     Therapy Charges for Today     Code Description Service Date Service Provider Modifiers Qty    97012311658 HC PT EVAL LOW COMPLEXITY 4 8/24/2022 Laura Pate, PT GP 1          PT G-Codes  Outcome Measure Options: Neck Disability Index (NDI)  Neck Disability Index Score: 11         Laura Pate, PT  8/24/2022

## 2022-08-26 ENCOUNTER — HOSPITAL ENCOUNTER (OUTPATIENT)
Dept: PHYSICAL THERAPY | Facility: HOSPITAL | Age: 62
Setting detail: THERAPIES SERIES
Discharge: HOME | End: 2022-08-26

## 2022-08-26 DIAGNOSIS — G89.29 CHRONIC NECK PAIN: Primary | ICD-10-CM

## 2022-08-26 DIAGNOSIS — M54.2 CHRONIC NECK PAIN: Primary | ICD-10-CM

## 2022-08-26 PROCEDURE — 97140 MANUAL THERAPY 1/> REGIONS: CPT

## 2022-08-26 PROCEDURE — 97035 APP MDLTY 1+ULTRASOUND EA 15: CPT

## 2022-08-26 PROCEDURE — 97110 THERAPEUTIC EXERCISES: CPT

## 2022-08-26 NOTE — THERAPY TREATMENT NOTE
Outpatient Physical Therapy Ortho Treatment Note  KORY Llamas     Patient Name: Cecily Broussard  : 1960  MRN: [id number]  Today's Date: 2022      Visit Date: 2022    Visit Dx:    ICD-10-CM ICD-9-CM   1. Chronic neck pain  M54.2 723.1    G89.29 338.29       Patient Active Problem List   Diagnosis   • Depression   • Hyperlipidemia   • Adult hypothyroidism   • Insomnia   • Low grade squamous intraepith lesion on cytologic smear cervix (lgsil)   • Menopausal disorder   • Arthralgia of multiple joints   • Osteoarthritis   • Positive anti-CCP test   • Vitamin D deficiency   • Healthcare maintenance   • History of colon polyps   • HPV (human papilloma virus) infection   • S/P conization of cervix   • Drug therapy   • Personal history of colonic polyps   • H/O abnormal cervical Papanicolaou smear   • Dysplasia of cervix, high grade REAGAN 2   • Large breasts   • Chronic neck pain        Past Medical History:   Diagnosis Date   • Abnormal Pap smear of cervix    • Arthritis    • Cervical dysplasia 2016    REAGAN 1on bx, CKC with REAGAN 2, neg margins   • Depression    • Disease of thyroid gland    • Hyperlipidemia    • Menopause         Past Surgical History:   Procedure Laterality Date   • ABDOMINOPLASTY     • CERVICAL CONIZATION N/A 2020    Procedure: CERVICAL CONIZATION;  Surgeon: Queta Brandt MD;  Location: [facility];  Service: Obstetrics/Gynecology;  Laterality: N/A;   •  SECTION      x 1   • COLONOSCOPY     • LAPAROSCOPIC ASSISTED VAGINAL HYSTERECTOMY SALPINGO OOPHORECTOMY     • OOPHORECTOMY      RSO   • TOTAL ABDOMINAL HYSTERECTOMY WITH SALPINGO OOPHORECTOMY     • TOTAL LAPAROSCOPIC HYSTERECTOMY Right 10/19/2021    Procedure: TOTAL LAPAROSCOPIC HYSTERECTOMY, right salpingo-oophorectomy;  Surgeon: Queta Brandt MD;  Location: [facility];  Service: Obstetrics/Gynecology;  Laterality: Right;   • TUBAL ABDOMINAL LIGATION          PT Ortho     Row Name 22  "0800       Subjective Comments    Subjective Comments Pt reports she feels really tight in between her shoulder blades & on the left side at base of skull.  No HA today.  \"I have been doing the stretches, but it didn't feel any better.\"  \"I need to get a heating pad for home.\"  -LN       Precautions and Contraindications    Precautions/Limitations no known precautions/limitations  -LN       Subjective Pain    Able to rate subjective pain? yes  -LN    Pre-Treatment Pain Level 7  -LN          User Key  (r) = Recorded By, (t) = Taken By, (c) = Cosigned By    Initials Name Provider Type    Laura Cannon, PT Physical Therapist                             PT Assessment/Plan     Row Name 08/26/22 1200          PT Assessment    Assessment Comments Patient continues to c/o pain and tightness in B OA area, L>R and into B UT and into B midscapula areas; tightness is worse on right then left with multiple trigger points palpated. She did have decreased pain and tightness after session. She is tolerating exercises fairly well.  No HA today.  -LN            PT Plan    PT Frequency 2x/week  -LN     PT Plan Comments Continue per POC. Progress with exercises as tolerated. Consider trial of mechanical ICTX and trial of kinesiotape. Modalities PRN; manual therapy and pt education.  -LN           User Key  (r) = Recorded By, (t) = Taken By, (c) = Cosigned By    Initials Name Provider Type    Laura Cannon, PT Physical Therapist                 Modalities     Row Name 08/26/22 0800             Precautions    Existing Precautions/Restrictions no known precautions/restrictions  -LN              Ultrasound 68955    Location B cervical PS/scalenes/OA area/UT/MT/rhomboids with pt seated in chair.  -LN      Duty Cycle 100  -LN      Frequency 1.0 MHz  -LN      Intensity - Wts/cm 1.5  -LN      03707 - PT Ultrasound Minutes 8  -LN            User Key  (r) = Recorded By, (t) = Taken By, (c) = Cosigned By    Initials Name " "Provider Type    LN Laura Pate, PT Physical Therapist               OP Exercises     Row Name 08/26/22 0800 08/26/22 0700          Precautions    Existing Precautions/Restrictions no known precautions/restrictions  -LN --            Subjective Comments    Subjective Comments Pt reports she feels really tight in between her shoulder blades & on the left side at base of skull.  No HA today.  \"I have been doing the stretches, but it didn't feel any better.\"  \"I need to get a heating pad for home.\"  -LN --            Subjective Pain    Able to rate subjective pain? yes  -LN --     Pre-Treatment Pain Level 7  -LN --            Total Minutes    63138 - PT Therapeutic Exercise Minutes 8  -LN --     35096 - PT Manual Therapy Minutes -- 30  -LN            Exercise 1    Exercise Name 1 supine chin tucks  -LN --     Cueing 1 Verbal;Tactile;Demo  -LN --     Reps 1 5  -LN --     Time 1 5 sec  -LN --            Exercise 2    Exercise Name 2 supine cervical rotation  -LN --     Cueing 2 Verbal;Tactile;Demo  -LN --     Reps 2 5 each  -LN --     Time 2 2-3 sec  -LN --            Exercise 3    Exercise Name 3 UT stretch  -LN --     Cueing 3 Verbal;Tactile;Demo  -LN --     Reps 3 3 each  -LN --     Time 3 10 sec each  -LN --            Exercise 4    Exercise Name 4 scapular retraction  -LN --     Cueing 4 Verbal;Tactile;Demo  -LN --     Reps 4 5  -LN --     Time 4 10 sec  -LN --           User Key  (r) = Recorded By, (t) = Taken By, (c) = Cosigned By    Initials Name Provider Type    LN Laura Pate, PT Physical Therapist                         Manual Rx (last 36 hours)     Manual Treatments     Row Name 08/26/22 0700             Total Minutes    01273 - PT Manual Therapy Minutes 30  -LN              Manual Rx 1    Manual Rx 1 Location STM to B cervical PS/scalenes/UT/MT after US with pt seated.  -LN      Manual Rx 1 Duration 8 minutes  -LN              Manual Rx 2    Manual Rx 2 Location supine SOR  -LN      " Manual Rx 2 Duration 4 minutes; 3 minutes  -LN              Manual Rx 3    Manual Rx 3 Location supine gentle manual cervical traction  -LN      Manual Rx 3 Duration 12 x 10-20 seconds  -LN              Manual Rx 4    Manual Rx 4 Location supine cervical passive cervical rotation  -LN      Manual Rx 4 Duration 3 x 5 seconds each  -LN              Manual Rx 5    Manual Rx 5 Location supine gentle passive levator scapulae stretch  -LN      Manual Rx 5 Duration 3 x 5 seconds each  -LN              Manual Rx 6    Manual Rx 6 Location supine gentle passive UT stretch  -LN      Manual Rx 6 Duration 3 x 10 sec each  -LN              Manual Rx 7    Manual Rx 7 Location supine gentle passive cervical flexion  -LN      Manual Rx 7 Duration 3 x 5 seconds  -LN              Manual Rx 8    Manual Rx 8 Location supine cervical massage and gentle cervical glides  -LN      Manual Rx 8 Duration 5 min  -LN              Manual Rx 9    Manual Rx 9 Location UT with arm circles stretch  -LN      Manual Rx 9 Duration 3 cycles on each side  -LN            User Key  (r) = Recorded By, (t) = Taken By, (c) = Cosigned By    Initials Name Provider Type    Laura Cannon, PT Physical Therapist                    Therapy Education  Education Details: Pt to add UT stretch and scapular retraction to HEP and instructed patient in home SOR with tennis balls; she is to try this at  home up to 5 minutes as tolerated to help relieve OA tightness and tension HA. She is going to purchase a MHP and use as needed.  Given: HEP, Symptoms/condition management, Pain management, Posture/body mechanics  Program: New, Reinforced (added UT stretch & scapular retraction)  How Provided: Verbal, Demonstration, Written  Provided to: Patient  Level of Understanding: Teach back education performed, Verbalized, Demonstrated              Time Calculation:   Start Time: 0855  Stop Time: 1015  Time Calculation (min): 80 min  Timed Charges  52142 - PT Ultrasound  Minutes: 8  80502 - PT Therapeutic Exercise Minutes: 8  26582 - PT Manual Therapy Minutes: 30  Total Minutes  Timed Charges Total Minutes: 16   Total Minutes: 16  Therapy Charges for Today     Code Description Service Date Service Provider Modifiers Qty    60200689871  PT THER PROC EA 15 MIN 8/26/2022 Laura Pate, PT GP 1    [id number]  PT MANUAL THERAPY EA 15 MIN 8/26/2022 Laura Pate, PT GP 2    [id number]  PT ULTRASOUND EA 15 MIN 8/26/2022 Laura Pate, PT GP 1                    Laura Pate, PT  8/26/2022

## 2022-08-30 ENCOUNTER — PREP FOR SURGERY (OUTPATIENT)
Dept: OTHER | Facility: HOSPITAL | Age: 62
End: 2022-08-30

## 2022-08-30 ENCOUNTER — OFFICE VISIT (OUTPATIENT)
Dept: PLASTIC SURGERY | Facility: CLINIC | Age: 62
End: 2022-08-30

## 2022-08-30 VITALS
HEIGHT: 63 IN | OXYGEN SATURATION: 97 % | SYSTOLIC BLOOD PRESSURE: 143 MMHG | DIASTOLIC BLOOD PRESSURE: 114 MMHG | WEIGHT: 180.8 LBS | TEMPERATURE: 97.1 F | HEART RATE: 91 BPM | BODY MASS INDEX: 32.04 KG/M2

## 2022-08-30 DIAGNOSIS — N62 MACROMASTIA: Primary | ICD-10-CM

## 2022-08-30 DIAGNOSIS — L98.7 EXCESSIVE AND REDUNDANT SKIN AND SUBCUTANEOUS TISSUE: ICD-10-CM

## 2022-08-30 PROCEDURE — 99214 OFFICE O/P EST MOD 30 MIN: CPT | Performed by: NURSE PRACTITIONER

## 2022-08-30 RX ORDER — CEFAZOLIN SODIUM 2 G/100ML
2 INJECTION, SOLUTION INTRAVENOUS ONCE
Status: CANCELLED | OUTPATIENT
Start: 2022-08-30 | End: 2022-08-30

## 2022-08-31 ENCOUNTER — HOSPITAL ENCOUNTER (OUTPATIENT)
Dept: PHYSICAL THERAPY | Facility: HOSPITAL | Age: 62
Setting detail: THERAPIES SERIES
Discharge: HOME | End: 2022-08-31

## 2022-08-31 ENCOUNTER — PATIENT ROUNDING (BHMG ONLY) (OUTPATIENT)
Dept: PLASTIC SURGERY | Facility: CLINIC | Age: 62
End: 2022-08-31

## 2022-08-31 DIAGNOSIS — M54.2 CHRONIC NECK PAIN: Primary | ICD-10-CM

## 2022-08-31 DIAGNOSIS — G89.29 CHRONIC NECK PAIN: Primary | ICD-10-CM

## 2022-08-31 PROCEDURE — 97035 APP MDLTY 1+ULTRASOUND EA 15: CPT

## 2022-08-31 PROCEDURE — 97140 MANUAL THERAPY 1/> REGIONS: CPT

## 2022-08-31 NOTE — PROGRESS NOTES
A [employer] message has been sent to the patient for PATIENT ROUNDING with [facility].

## 2022-09-02 ENCOUNTER — HOSPITAL ENCOUNTER (OUTPATIENT)
Dept: PHYSICAL THERAPY | Facility: HOSPITAL | Age: 62
Setting detail: THERAPIES SERIES
Discharge: HOME | End: 2022-09-02

## 2022-09-02 DIAGNOSIS — M54.2 CHRONIC NECK PAIN: Primary | ICD-10-CM

## 2022-09-02 DIAGNOSIS — G89.29 CHRONIC NECK PAIN: Primary | ICD-10-CM

## 2022-09-02 PROCEDURE — 97035 APP MDLTY 1+ULTRASOUND EA 15: CPT

## 2022-09-02 PROCEDURE — 97140 MANUAL THERAPY 1/> REGIONS: CPT

## 2022-09-02 NOTE — THERAPY TREATMENT NOTE
Outpatient Physical Therapy Ortho Treatment Note  KORY Llamas     Patient Name: Cecily Broussard  : 1960  MRN: [id number]  Today's Date: 2022      Visit Date: 2022    Visit Dx:    ICD-10-CM ICD-9-CM   1. Chronic neck pain  M54.2 723.1    G89.29 338.29       Patient Active Problem List   Diagnosis   • Depression   • Hyperlipidemia   • Adult hypothyroidism   • Insomnia   • Low grade squamous intraepith lesion on cytologic smear cervix (lgsil)   • Menopausal disorder   • Arthralgia of multiple joints   • Osteoarthritis   • Positive anti-CCP test   • Vitamin D deficiency   • Healthcare maintenance   • History of colon polyps   • HPV (human papilloma virus) infection   • S/P conization of cervix   • Drug therapy   • Personal history of colonic polyps   • H/O abnormal cervical Papanicolaou smear   • Dysplasia of cervix, high grade REAGAN 2   • Large breasts   • Chronic neck pain        Past Medical History:   Diagnosis Date   • Abnormal Pap smear of cervix    • Arthritis    • Cervical dysplasia 2016    REAGAN 1on bx, CKC with REAGAN 2, neg margins   • Depression    • Disease of thyroid gland    • Hyperlipidemia    • Menopause         Past Surgical History:   Procedure Laterality Date   • ABDOMINOPLASTY     • CERVICAL CONIZATION N/A 2020    Procedure: CERVICAL CONIZATION;  Surgeon: Queta Brandt MD;  Location: [facility];  Service: Obstetrics/Gynecology;  Laterality: N/A;   •  SECTION      x 1   • COLONOSCOPY     • LAPAROSCOPIC ASSISTED VAGINAL HYSTERECTOMY SALPINGO OOPHORECTOMY     • OOPHORECTOMY      RSO   • TOTAL ABDOMINAL HYSTERECTOMY WITH SALPINGO OOPHORECTOMY     • TOTAL LAPAROSCOPIC HYSTERECTOMY Right 10/19/2021    Procedure: TOTAL LAPAROSCOPIC HYSTERECTOMY, right salpingo-oophorectomy;  Surgeon: Queta Brandt MD;  Location: [facility];  Service: Obstetrics/Gynecology;  Laterality: Right;   • TUBAL ABDOMINAL LIGATION          PT Ortho     Row Name 22  1000       Subjective Comments    Subjective Comments pt states she has improved ROM and is feeling much better  -          User Key  (r) = Recorded By, (t) = Taken By, (c) = Cosigned By    Initials Name Provider Type     Luke Cantor PTA Physical Therapist Assistant                             PT Assessment/Plan     Row Name 09/02/22 1353          PT Assessment    Assessment Comments pt with improved cervical AROM and overall decreased symptoms; continues with palpable muscle tightness (B) UT/cervical PS (R)>(L)  -            PT Plan    PT Plan Comments Cont per POC, progressing as tolerated; continue to consider trial of ICTX and kinesiotape  -           User Key  (r) = Recorded By, (t) = Taken By, (c) = Cosigned By    Initials Name Provider Type    PAUL Devaughn Luke Bal PTA Physical Therapist Assistant                 Modalities     Row Name 09/02/22 1000             Precautions    Existing Precautions/Restrictions no known precautions/restrictions  -              Moist Heat    Location MH to cervical area with pt supine in hooklying.  -      PT Moist Heat Minutes 10  -[facility] S/P Rx Yes  -              Ultrasound 92033    Location B cervical PS/scalenes/OA area/UT/MT/rhomboids with pt seated in chair.  -      Duty Cycle 100  -      Frequency 1.0 MHz  -      Intensity - Wts/cm 1.5  -      31056 - PT Ultrasound Minutes 8  -MH            User Key  (r) = Recorded By, (t) = Taken By, (c) = Cosigned By    Initials Name Provider Type    PAUL Cantor Luke Bal PTA Physical Therapist Assistant               OP Exercises     Row Name 09/02/22 1356 09/02/22 1000          Precautions    Existing Precautions/Restrictions -- no known precautions/restrictions  -            Subjective Comments    Subjective Comments -- pt states she has improved ROM and is feeling much better  -            Total Minutes    90282 - PT Manual Therapy Minutes 30  -MH --           User Key  (r) = Recorded By, (t) = Taken  By, (c) = Cosigned By    Initials Name Provider Type    PAUL Cantor Luke Bal, EVAN Physical Therapist Assistant                         Manual Rx (last 36 hours)     Manual Treatments     Row Name 09/02/22 1356 09/02/22 1000          Total Minutes    39537 - PT Manual Therapy Minutes 30  - --            Manual Rx 1    Manual Rx 1 Location -- STM to B cervical PS/scalenes/UT/MT after US with pt seated.  -     Manual Rx 1 Duration -- 8 minutes  -            Manual Rx 2    Manual Rx 2 Location -- supine SOR  -     Manual Rx 2 Duration -- 4 minutes; 3 minutes  -            Manual Rx 3    Manual Rx 3 Location -- supine gentle manual cervical traction  -     Manual Rx 3 Duration -- 12 x 10-20 seconds  -            Manual Rx 4    Manual Rx 4 Location -- supine cervical passive cervical rotation  -     Manual Rx 4 Duration -- 3 x 5 seconds each  -            Manual Rx 5    Manual Rx 5 Location -- supine gentle passive levator scapulae stretch  -     Manual Rx 5 Duration -- 3 x 5 seconds each  -            Manual Rx 6    Manual Rx 6 Location -- supine gentle passive UT stretch  -     Manual Rx 6 Duration -- 3 x 10 sec each  -            Manual Rx 7    Manual Rx 7 Location -- supine gentle passive cervical flexion  -     Manual Rx 7 Duration -- 3 x 5 seconds  -           User Key  (r) = Recorded By, (t) = Taken By, (c) = Cosigned By    Initials Name Provider Type    Luke Vallejo EVAN Bal Physical Therapist Assistant                                   Time Calculation:   Start Time: 1008  Stop Time: 1110  Time Calculation (min): 62 min  Timed Charges  54194 - PT Ultrasound Minutes: 8  29646 - PT Manual Therapy Minutes: 30  Untimed Charges  PT Moist Heat Minutes: 10  Total Minutes  Timed Charges Total Minutes: 30  Untimed Charges Total Minutes: 10   Total Minutes: 30  Therapy Charges for Today     Code Description Service Date Service Provider Modifiers Qty    70561036410 HC PT MANUAL THERAPY EA 15  MIN 9/2/2022 Luke Cantor, EVAN GP 2    [id number] HC PT ULTRASOUND EA 15 MIN 9/2/2022 Luke Cantor PTA GP 1                    Luke Cantor PTA  9/2/2022

## 2022-09-07 ENCOUNTER — HOSPITAL ENCOUNTER (OUTPATIENT)
Dept: PHYSICAL THERAPY | Facility: HOSPITAL | Age: 62
Setting detail: THERAPIES SERIES
Discharge: HOME | End: 2022-09-07

## 2022-09-07 DIAGNOSIS — M54.2 CHRONIC NECK PAIN: Primary | ICD-10-CM

## 2022-09-07 DIAGNOSIS — G89.29 CHRONIC NECK PAIN: Primary | ICD-10-CM

## 2022-09-07 PROCEDURE — 97035 APP MDLTY 1+ULTRASOUND EA 15: CPT

## 2022-09-07 PROCEDURE — 97140 MANUAL THERAPY 1/> REGIONS: CPT

## 2022-09-07 NOTE — THERAPY TREATMENT NOTE
Outpatient Physical Therapy Ortho Treatment Note  KORY Llamas     Patient Name: Cecily Broussard  : 1960  MRN: [id number]  Today's Date: 2022      Visit Date: 2022    Visit Dx:    ICD-10-CM ICD-9-CM   1. Chronic neck pain  M54.2 723.1    G89.29 338.29       Patient Active Problem List   Diagnosis   • Depression   • Hyperlipidemia   • Adult hypothyroidism   • Insomnia   • Low grade squamous intraepith lesion on cytologic smear cervix (lgsil)   • Menopausal disorder   • Arthralgia of multiple joints   • Osteoarthritis   • Positive anti-CCP test   • Vitamin D deficiency   • Healthcare maintenance   • History of colon polyps   • HPV (human papilloma virus) infection   • S/P conization of cervix   • Drug therapy   • Personal history of colonic polyps   • H/O abnormal cervical Papanicolaou smear   • Dysplasia of cervix, high grade REAGAN 2   • Large breasts   • Chronic neck pain        Past Medical History:   Diagnosis Date   • Abnormal Pap smear of cervix    • Arthritis    • Cervical dysplasia 2016    REAGAN 1on bx, CKC with REAGAN 2, neg margins   • Depression    • Disease of thyroid gland    • Hyperlipidemia    • Menopause         Past Surgical History:   Procedure Laterality Date   • ABDOMINOPLASTY     • CERVICAL CONIZATION N/A 2020    Procedure: CERVICAL CONIZATION;  Surgeon: Queta Brandt MD;  Location: [facility];  Service: Obstetrics/Gynecology;  Laterality: N/A;   •  SECTION      x 1   • COLONOSCOPY     • LAPAROSCOPIC ASSISTED VAGINAL HYSTERECTOMY SALPINGO OOPHORECTOMY     • OOPHORECTOMY      RSO   • TOTAL ABDOMINAL HYSTERECTOMY WITH SALPINGO OOPHORECTOMY     • TOTAL LAPAROSCOPIC HYSTERECTOMY Right 10/19/2021    Procedure: TOTAL LAPAROSCOPIC HYSTERECTOMY, right salpingo-oophorectomy;  Surgeon: Queta Brandt MD;  Location: [facility];  Service: Obstetrics/Gynecology;  Laterality: Right;   • TUBAL ABDOMINAL LIGATION          PT Ortho     Row Name 22  "1000       Subjective Comments    Subjective Comments pt states her (R) side has been feeling much better but had pain on the (L) side; continues to complain of pain and soreness (B) OA area and behind ears  -          User Key  (r) = Recorded By, (t) = Taken By, (c) = Cosigned By    Initials Name Provider Type    Luke Vallejo PTA Physical Therapist Assistant                             PT Assessment/Plan     Row Name 09/07/22 1115          PT Assessment    Assessment Comments pt continues with overall improvement of symptoms and ROM since starting therapy but still with palpable muscle tightness throughout (B) UT/MT and cervical PS; most tightness noted (R) but pt reports most discomfort (L)  -            PT Plan    PT Plan Comments Pt to continue with HEP; check response to kinesiotape  -           User Key  (r) = Recorded By, (t) = Taken By, (c) = Cosigned By    Initials Name Provider Type    Luke Vallejo PTA Physical Therapist Assistant                 Modalities     Row Name 09/07/22 1000             Precautions    Existing Precautions/Restrictions no known precautions/restrictions  -              Moist Heat    Location  - 2 cervical packs, one drapped over each shoulder/UT/MT area - pt supine with bolster under knees  -      PT Moist Heat Minutes 10  -[facility] S/P Rx Yes  -              Ultrasound 13424    Location B cervical PS/scalenes/OA area/UT/MT/rhomboids with pt seated in chair.  -      Duty Cycle 100  -      Frequency 1.0 MHz  -      Intensity - Wts/cm 1.5  -      43935 - PT Ultrasound Minutes 8  -              Other Treatment Provided    Taping / Bracing Trial of kinesiotape to (B) UT for inhibition using \"I\" strips.  Pt to leave tape up to 5 days and trim/remove as needed.  -            User Key  (r) = Recorded By, (t) = Taken By, (c) = Cosigned By    Initials Name Provider Type    Luke Vallejo PTA Physical Therapist Assistant                         "   Manual Rx (last 36 hours)     Manual Treatments     Row Name 09/07/22 1120 09/07/22 1000          Total Minutes    08833 - PT Manual Therapy Minutes 35  - --            Manual Rx 1    Manual Rx 1 Location -- STM to B cervical PS/scalenes/UT/MT after US with pt seated.  -     Manual Rx 1 Duration -- 11 min  -            Manual Rx 2    Manual Rx 2 Location -- supine SOR  -     Manual Rx 2 Duration -- 4 minutes; 3 minutes  -            Manual Rx 3    Manual Rx 3 Location -- supine gentle manual cervical traction  -     Manual Rx 3 Duration -- 12 x 10-20 seconds  -            Manual Rx 4    Manual Rx 4 Location -- supine cervical passive cervical rotation  -     Manual Rx 4 Duration -- 3 x 5 seconds each  -            Manual Rx 5    Manual Rx 5 Location -- supine gentle passive levator scapulae stretch  -     Manual Rx 5 Duration -- 3 x 5 seconds each  -            Manual Rx 6    Manual Rx 6 Location -- supine gentle passive UT stretch  -     Manual Rx 6 Duration -- 3 x 10 sec each  -            Manual Rx 7    Manual Rx 7 Location -- supine gentle passive cervical flexion  -     Manual Rx 7 Duration -- 3 x 5 seconds  -           User Key  (r) = Recorded By, (t) = Taken By, (c) = Cosigned By    Initials Name Provider Type     Luke Cantor, PTA Physical Therapist Assistant                    Therapy Education  Education Details: Explanation of kinesiotape with instruction for care of tape, wear schedule and safe removal; discussed use of tennis ball in a sock then standing against the wall to apply pressure to trigger points  Given: HEP, Symptoms/condition management  Program: Reinforced  How Provided: Verbal  Provided to: Patient  Level of Understanding: Verbalized              Time Calculation:   Start Time: 0958  Stop Time: 1108  Time Calculation (min): 70 min  Timed Charges  90412 - PT Ultrasound Minutes: 8  20864 - PT Manual Therapy Minutes: 35  Untimed Charges  PT Moist Heat  Minutes: 10  Total Minutes  Timed Charges Total Minutes: 35  Untimed Charges Total Minutes: 10   Total Minutes: 35  Therapy Charges for Today     Code Description Service Date Service Provider Modifiers Qty    55169961009 HC PT MANUAL THERAPY EA 15 MIN 9/7/2022 Luke Cantor PTA GP 2    23088036411 HC PT ULTRASOUND EA 15 MIN 9/7/2022 Luke Cantor PTA GP 1                    Luke Cantor PTA  9/7/2022

## 2022-09-09 ENCOUNTER — HOSPITAL ENCOUNTER (OUTPATIENT)
Dept: PHYSICAL THERAPY | Facility: HOSPITAL | Age: 62
Setting detail: THERAPIES SERIES
Discharge: HOME | End: 2022-09-09

## 2022-09-09 DIAGNOSIS — G89.29 CHRONIC NECK PAIN: Primary | ICD-10-CM

## 2022-09-09 DIAGNOSIS — M54.2 CHRONIC NECK PAIN: Primary | ICD-10-CM

## 2022-09-09 PROCEDURE — 97035 APP MDLTY 1+ULTRASOUND EA 15: CPT

## 2022-09-09 PROCEDURE — 97140 MANUAL THERAPY 1/> REGIONS: CPT

## 2022-09-09 PROCEDURE — 97012 MECHANICAL TRACTION THERAPY: CPT

## 2022-09-09 NOTE — THERAPY TREATMENT NOTE
Outpatient Physical Therapy Ortho Treatment Note  [facility]lam[facility]     Patient Name: Cecily Broussard  : 1960  MRN: [id number]  Today's Date: 2022      Visit Date: 2022    Visit Dx:    ICD-10-CM ICD-9-CM   1. Chronic neck pain  M54.2 723.1    G89.29 338.29       Patient Active Problem List   Diagnosis   • Depression   • Hyperlipidemia   • Adult hypothyroidism   • Insomnia   • Low grade squamous intraepith lesion on cytologic smear cervix (lgsil)   • Menopausal disorder   • Arthralgia of multiple joints   • Osteoarthritis   • Positive anti-CCP test   • Vitamin D deficiency   • Healthcare maintenance   • History of colon polyps   • HPV (human papilloma virus) infection   • S/P conization of cervix   • Drug therapy   • Personal history of colonic polyps   • H/O abnormal cervical Papanicolaou smear   • Dysplasia of cervix, high grade REAGAN 2   • Large breasts   • Chronic neck pain        Past Medical History:   Diagnosis Date   • Abnormal Pap smear of cervix    • Arthritis    • Cervical dysplasia 2016    REAGAN 1on bx, CKC with REAGAN 2, neg margins   • Depression    • Disease of thyroid gland    • Hyperlipidemia    • Menopause         Past Surgical History:   Procedure Laterality Date   • ABDOMINOPLASTY     • CERVICAL CONIZATION N/A 2020    Procedure: CERVICAL CONIZATION;  Surgeon: Queta Brandt MD;  Location: [facility];  Service: Obstetrics/Gynecology;  Laterality: N/A;   •  SECTION      x 1   • COLONOSCOPY     • LAPAROSCOPIC ASSISTED VAGINAL HYSTERECTOMY SALPINGO OOPHORECTOMY     • OOPHORECTOMY      RSO   • TOTAL ABDOMINAL HYSTERECTOMY WITH SALPINGO OOPHORECTOMY     • TOTAL LAPAROSCOPIC HYSTERECTOMY Right 10/19/2021    Procedure: TOTAL LAPAROSCOPIC HYSTERECTOMY, right salpingo-oophorectomy;  Surgeon: Queta Brandt MD;  Location: [facility];  Service: Obstetrics/Gynecology;  Laterality: Right;   • TUBAL ABDOMINAL LIGATION          PT Ortho     Row Name 22  "1000       Subjective Comments    Subjective Comments pt states (R) side neck doing well - same pain on (L); pt reporting midscap area increased soreness - \"It feels bruised\"  -          User Key  (r) = Recorded By, (t) = Taken By, (c) = Cosigned By    Initials Name Provider Type     Luke Cantor PTA Physical Therapist Assistant                             PT Assessment/Plan     Row Name 09/09/22 1249          PT Assessment    Assessment Comments pt continues with moderate muscle tightness/spasms (B) UT's with increased tenderness to palpation of (B) mid scap areas - feel this tenderness is likely due to deep tissue/tp massage last session; pt reporting decreased symptoms after session  -            PT Plan    PT Plan Comments Cont per POC; check response to mechanical cervical traction  -           User Key  (r) = Recorded By, (t) = Taken By, (c) = Cosigned By    Initials Name Provider Type     Luke Cantor PTA Physical Therapist Assistant                 Modalities     Row Name 09/09/22 1000             Moist Heat    Location MHP - cervical and lumbar - cspine and mid back - pt supine with bolster under knees  -      PT Moist Heat Minutes 10  -MH       S/P Rx --  after manual, prior to traction  -              Ultrasound 96408    Location (B) UT/midscap area; (L) cervical PS  -      Duty Cycle 100  -      Frequency 1.0 MHz  -      Intensity - Wts/cm 1.5  -      52313 - PT Ultrasound Minutes 8  -MH              Traction 68933    PT Traction Rx Minutes 11  -MH      Duration Intermittent  -      Position Hook-lying  bolster under knees  -      Weight 15  -MH      Hold 45  -MH      Relax 15  -MH            User Key  (r) = Recorded By, (t) = Taken By, (c) = Cosigned By    Initials Name Provider Type     Luke Cantor PTA Physical Therapist Assistant               OP Exercises     Row Name 09/09/22 1254 09/09/22 1000          Subjective Comments    Subjective Comments -- pt " "states (R) side neck doing well - same pain on (L); pt reporting midscap area increased soreness - \"It feels bruised\"  -            Total Minutes    75978 - PT Manual Therapy Minutes 30  -MH --           User Key  (r) = Recorded By, (t) = Taken By, (c) = Cosigned By    Initials Name Provider Type     Luke Cantor PTA Physical Therapist Assistant                         Manual Rx (last 36 hours)     Manual Treatments     Row Name 09/09/22 1254 09/09/22 1000          Total Minutes    67038 - PT Manual Therapy Minutes 30  -MH --            Manual Rx 1    Manual Rx 1 Location -- (B) UT/cervical PS/mid scap area  -     Manual Rx 1 Type -- STM/MFR  -     Manual Rx 1 Duration -- 8 min  -            Manual Rx 2    Manual Rx 2 Location -- supine SOR  -     Manual Rx 2 Duration -- 3 min; 3 min  -            Manual Rx 3    Manual Rx 3 Location -- supine gentle manual cervical traction  -     Manual Rx 3 Duration -- 3 x 10 sec  -           User Key  (r) = Recorded By, (t) = Taken By, (c) = Cosigned By    Initials Name Provider Type     Luke Cantor PTA Physical Therapist Assistant                    Therapy Education  Given: HEP, Symptoms/condition management  Program: Reinforced  How Provided: Verbal  Provided to: Patient  Level of Understanding: Verbalized              Time Calculation:   Start Time: 1010  Stop Time: 1121  Time Calculation (min): 71 min  Timed Charges  39204 - PT Ultrasound Minutes: 8  38641 - PT Manual Therapy Minutes: 30  Untimed Charges  PT Traction Rx Minutes: 11  PT Moist Heat Minutes: 10  Total Minutes  Timed Charges Total Minutes: 30  Untimed Charges Total Minutes: 21   Total Minutes: 30  Therapy Charges for Today     Code Description Service Date Service Provider Modifiers Qty    50114942083 HC PT MANUAL THERAPY EA 15 MIN 9/9/2022 Luke Cantor PTA GP 2    54789772554 HC PT ULTRASOUND EA 15 MIN 9/9/2022 Luke Cantor PTA GP 1    87011510257 HC PT TRACTION CERVICAL " 9/9/2022 Luke Cantor, PTA GP 1                    Luke Cantor, PTA  9/9/2022

## 2022-09-14 ENCOUNTER — TELEPHONE (OUTPATIENT)
Dept: PLASTIC SURGERY | Facility: CLINIC | Age: 62
End: 2022-09-14

## 2022-09-27 ENCOUNTER — HOSPITAL ENCOUNTER (OUTPATIENT)
Dept: PHYSICAL THERAPY | Facility: HOSPITAL | Age: 62
Setting detail: THERAPIES SERIES
Discharge: HOME | End: 2022-09-27

## 2022-09-27 DIAGNOSIS — M54.2 CHRONIC NECK PAIN: Primary | ICD-10-CM

## 2022-09-27 DIAGNOSIS — G89.29 CHRONIC NECK PAIN: Primary | ICD-10-CM

## 2022-09-27 PROCEDURE — 97012 MECHANICAL TRACTION THERAPY: CPT

## 2022-09-27 PROCEDURE — 97035 APP MDLTY 1+ULTRASOUND EA 15: CPT

## 2022-09-27 PROCEDURE — 97140 MANUAL THERAPY 1/> REGIONS: CPT

## 2022-09-27 NOTE — THERAPY TREATMENT NOTE
Outpatient Physical Therapy Ortho Treatment Note  [facility]     Patient Name: Cecily Broussard  : 1960  MRN: [id number]  Today's Date: 2022      Visit Date: 2022    Visit Dx:    ICD-10-CM ICD-9-CM   1. Chronic neck pain  M54.2 723.1    G89.29 338.29       Patient Active Problem List   Diagnosis   • Depression   • Hyperlipidemia   • Adult hypothyroidism   • Insomnia   • Low grade squamous intraepith lesion on cytologic smear cervix (lgsil)   • Menopausal disorder   • Arthralgia of multiple joints   • Osteoarthritis   • Positive anti-CCP test   • Vitamin D deficiency   • Healthcare maintenance   • History of colon polyps   • HPV (human papilloma virus) infection   • S/P conization of cervix   • Drug therapy   • Personal history of colonic polyps   • H/O abnormal cervical Papanicolaou smear   • Dysplasia of cervix, high grade REAGAN 2   • Large breasts   • Chronic neck pain        Past Medical History:   Diagnosis Date   • Abnormal Pap smear of cervix    • Arthritis    • Cervical dysplasia 2016    REAGAN 1on bx, CKC with REAGAN 2, neg margins   • Depression    • Disease of thyroid gland    • Hyperlipidemia    • Menopause         Past Surgical History:   Procedure Laterality Date   • ABDOMINOPLASTY     • CERVICAL CONIZATION N/A 2020    Procedure: CERVICAL CONIZATION;  Surgeon: Queta Brandt MD;  Location: [facility];  Service: Obstetrics/Gynecology;  Laterality: N/A;   •  SECTION      x 1   • COLONOSCOPY     • LAPAROSCOPIC ASSISTED VAGINAL HYSTERECTOMY SALPINGO OOPHORECTOMY     • OOPHORECTOMY      RSO   • TOTAL ABDOMINAL HYSTERECTOMY WITH SALPINGO OOPHORECTOMY     • TOTAL LAPAROSCOPIC HYSTERECTOMY Right 10/19/2021    Procedure: TOTAL LAPAROSCOPIC HYSTERECTOMY, right salpingo-oophorectomy;  Surgeon: Queta Brandt MD;  Location: [facility];  Service: Obstetrics/Gynecology;  Laterality: Right;   • TUBAL ABDOMINAL LIGATION          PT Ortho     Row Name 22  1000       Subjective Comments    Subjective Comments pt states she has noticed little to no lasting change in her symptoms and ROM since starting therapy; states she feels good after her sessions but it doesn't last; pt states she is going to be scheduled for breast reduction (both MD's feel this is cause of her symptoms) but can't get an answer from MD office on when it will be - pt states she is so far down on the list that she is concerned it may be next year.  -          User Key  (r) = Recorded By, (t) = Taken By, (c) = Cosigned By    Initials Name Provider Type     Luke Cantor PTA Physical Therapist Assistant                             PT Assessment/Plan     Row Name 09/27/22 1347          PT Assessment    Assessment Comments pt continues with palpable muscle tightness/spasms throught cervical/trap muscles; pt demonstrates increased cervical AROM and reports decreased symptoms after therapy but minimal carryover at this time  -            PT Plan    PT Plan Comments Cont per POC, progress scapular strengthening as pt tolerates  -           User Key  (r) = Recorded By, (t) = Taken By, (c) = Cosigned By    Initials Name Provider Type     Luke Cantor PTA Physical Therapist Assistant                 Modalities     Row Name 09/27/22 1000             Moist Heat    Location MHP (cervical x2) - cspine and mid back - pt supine with bolster under knees  -      PT Moist Heat Minutes 10  -[facility] S/P Rx --  after manual, prior to traction  -              Ultrasound 48597    Location (B) UT/midscap area; (L) cervical PS  -      Duty Cycle 100  -      Frequency 1.0 MHz  -      Intensity - Wts/cm 1.5  -      77504 - PT Ultrasound Minutes 8  prior to manual therapy  -              Traction 06170    PT Traction Rx Minutes 15  -MH      Position Hook-lying  bolster under knees  -      Weight 15  -MH      Hold 60  -MH      Relax 20  -MH              Other Treatment Provided    Taping /  "Bracing Applied kinesiotape to (B) UT for inhibition with additional \"I\" strip applied with space correction across (B) UT.  Pt to leave tape up to 5 days and trim/remove as needed.  -            User Key  (r) = Recorded By, (t) = Taken By, (c) = Cosigned By    Initials Name Provider Type    Luke Vallejo PTA Physical Therapist Assistant               OP Exercises     Row Name 09/27/22 1353 09/27/22 1000          Subjective Comments    Subjective Comments -- pt states she has noticed little to no lasting change in her symptoms and ROM since starting therapy; states she feels good after her sessions but it doesn't last; pt states she is going to be scheduled for breast reduction (both MD's feel this is cause of her symptoms) but can't get an answer from MD office on when it will be - pt states she is so far down on the list that she is concerned it may be next year.  -            Total Minutes    71841 - PT Therapeutic Exercise Minutes --  - --     55090 - PT Manual Therapy Minutes 15  - --            Exercise 1    Exercise Name 1 -- Verbal review of HEP - pt to add depression to scapula retraction and progress  to 20 reps x 5 second hold  -           User Key  (r) = Recorded By, (t) = Taken By, (c) = Cosigned By    Initials Name Provider Type    Luke Vallejo PTA Physical Therapist Assistant                         Manual Rx (last 36 hours)     Manual Treatments     Row Name 09/27/22 1353 09/27/22 1000          Total Minutes    12563 - PT Manual Therapy Minutes 15  - --            Manual Rx 1    Manual Rx 1 Location -- (B) cervical PS/UT/medial scap border - pt seated with pillow support  -     Manual Rx 1 Type -- STM; trigger point; MFR  -     Manual Rx 1 Duration -- 10  -           User Key  (r) = Recorded By, (t) = Taken By, (c) = Cosigned By    Initials Name Provider Type    Luke Vallejo PTA Physical Therapist Assistant                    Therapy Education  Education Details: " Again discussed with pt use of tennis ball in sock for applying pressure to trigger points; review of kinesiotape care  Given: HEP, Symptoms/condition management  Program: Reinforced, Progressed  How Provided: Verbal, Demonstration  Provided to: Patient  Level of Understanding: Teach back education performed, Verbalized, Demonstrated              Time Calculation:   Start Time: 1000  Stop Time: 1123  Time Calculation (min): 83 min  Timed Charges  77715 - PT Ultrasound Minutes: 8 (prior to manual therapy)  80878 - PT Manual Therapy Minutes: 15  Untimed Charges  PT Traction Rx Minutes: 15  PT Moist Heat Minutes: 10  Total Minutes  Timed Charges Total Minutes: 15  Untimed Charges Total Minutes: 25   Total Minutes: 15  Therapy Charges for Today     Code Description Service Date Service Provider Modifiers Qty    07014998550 HC PT MANUAL THERAPY EA 15 MIN 9/27/2022 Luke Cantor, EVAN GP 1    41500502357 HC PT TRACTION CERVICAL 9/27/2022 Luke Cantor PTA GP 1    33437326714 HC PT ULTRASOUND EA 15 MIN 9/27/2022 Luke Cantor PTA GP 1                    Luke Cantor PTA  9/27/2022

## 2022-09-29 ENCOUNTER — ANESTHESIA EVENT (OUTPATIENT)
Dept: PERIOP | Facility: HOSPITAL | Age: 62
End: 2022-09-29

## 2022-09-30 ENCOUNTER — ANESTHESIA (OUTPATIENT)
Dept: PERIOP | Facility: HOSPITAL | Age: 62
End: 2022-09-30

## 2022-09-30 ENCOUNTER — HOSPITAL ENCOUNTER (OUTPATIENT)
Facility: HOSPITAL | Age: 62
Setting detail: HOSPITAL OUTPATIENT SURGERY
Discharge: HOME | End: 2022-09-30
Attending: INTERNAL MEDICINE | Admitting: INTERNAL MEDICINE

## 2022-09-30 VITALS
WEIGHT: 178.4 LBS | TEMPERATURE: 97.6 F | OXYGEN SATURATION: 97 % | SYSTOLIC BLOOD PRESSURE: 142 MMHG | BODY MASS INDEX: 30.46 KG/M2 | RESPIRATION RATE: 16 BRPM | DIASTOLIC BLOOD PRESSURE: 87 MMHG | HEART RATE: 82 BPM | HEIGHT: 64 IN

## 2022-09-30 PROCEDURE — 45378 DIAGNOSTIC COLONOSCOPY: CPT | Performed by: INTERNAL MEDICINE

## 2022-09-30 PROCEDURE — 25010000002 PROPOFOL 10 MG/ML EMULSION: Performed by: NURSE ANESTHETIST, CERTIFIED REGISTERED

## 2022-09-30 RX ORDER — SODIUM CHLORIDE, SODIUM LACTATE, POTASSIUM CHLORIDE, CALCIUM CHLORIDE 600; 310; 30; 20 MG/100ML; MG/100ML; MG/100ML; MG/100ML
100 INJECTION, SOLUTION INTRAVENOUS CONTINUOUS
Status: DISCONTINUED | OUTPATIENT
Start: 2022-09-30 | End: 2022-09-30 | Stop reason: HOSPADM

## 2022-09-30 RX ORDER — SODIUM CHLORIDE 9 MG/ML
40 INJECTION, SOLUTION INTRAVENOUS AS NEEDED
Status: DISCONTINUED | OUTPATIENT
Start: 2022-09-30 | End: 2022-09-30 | Stop reason: HOSPADM

## 2022-09-30 RX ORDER — PROPOFOL 10 MG/ML
VIAL (ML) INTRAVENOUS AS NEEDED
Status: DISCONTINUED | OUTPATIENT
Start: 2022-09-30 | End: 2022-09-30 | Stop reason: SURG

## 2022-09-30 RX ORDER — LIDOCAINE HYDROCHLORIDE 10 MG/ML
0.5 INJECTION, SOLUTION EPIDURAL; INFILTRATION; INTRACAUDAL; PERINEURAL ONCE AS NEEDED
Status: DISCONTINUED | OUTPATIENT
Start: 2022-09-30 | End: 2022-09-30 | Stop reason: HOSPADM

## 2022-09-30 RX ORDER — SODIUM CHLORIDE, SODIUM LACTATE, POTASSIUM CHLORIDE, CALCIUM CHLORIDE 600; 310; 30; 20 MG/100ML; MG/100ML; MG/100ML; MG/100ML
9 INJECTION, SOLUTION INTRAVENOUS CONTINUOUS
Status: DISCONTINUED | OUTPATIENT
Start: 2022-09-30 | End: 2022-09-30 | Stop reason: HOSPADM

## 2022-09-30 RX ORDER — SODIUM CHLORIDE 0.9 % (FLUSH) 0.9 %
10 SYRINGE (ML) INJECTION EVERY 12 HOURS SCHEDULED
Status: DISCONTINUED | OUTPATIENT
Start: 2022-09-30 | End: 2022-09-30 | Stop reason: HOSPADM

## 2022-09-30 RX ORDER — ONDANSETRON 2 MG/ML
4 INJECTION INTRAMUSCULAR; INTRAVENOUS ONCE AS NEEDED
Status: DISCONTINUED | OUTPATIENT
Start: 2022-09-30 | End: 2022-09-30 | Stop reason: HOSPADM

## 2022-09-30 RX ORDER — LIDOCAINE HYDROCHLORIDE 20 MG/ML
INJECTION, SOLUTION INFILTRATION; PERINEURAL AS NEEDED
Status: DISCONTINUED | OUTPATIENT
Start: 2022-09-30 | End: 2022-09-30 | Stop reason: SURG

## 2022-09-30 RX ORDER — SODIUM CHLORIDE 0.9 % (FLUSH) 0.9 %
10 SYRINGE (ML) INJECTION AS NEEDED
Status: DISCONTINUED | OUTPATIENT
Start: 2022-09-30 | End: 2022-09-30 | Stop reason: HOSPADM

## 2022-09-30 RX ADMIN — PROPOFOL 100 MG: 10 INJECTION, EMULSION INTRAVENOUS at 09:04

## 2022-09-30 RX ADMIN — PROPOFOL 100 MG: 10 INJECTION, EMULSION INTRAVENOUS at 09:17

## 2022-09-30 RX ADMIN — LIDOCAINE HYDROCHLORIDE 100 MG: 20 INJECTION, SOLUTION INFILTRATION; PERINEURAL at 09:04

## 2022-09-30 RX ADMIN — SODIUM CHLORIDE, POTASSIUM CHLORIDE, SODIUM LACTATE AND CALCIUM CHLORIDE 9 ML/HR: 600; 310; 30; 20 INJECTION, SOLUTION INTRAVENOUS at 08:45

## 2022-09-30 RX ADMIN — PROPOFOL 100 MG: 10 INJECTION, EMULSION INTRAVENOUS at 09:09

## 2022-09-30 NOTE — ANESTHESIA PREPROCEDURE EVALUATION
Anesthesia Evaluation     Patient summary reviewed and Nursing notes reviewed   no history of anesthetic complications:  NPO Solid Status: > 8 hours  NPO Liquid Status: > 8 hours           Airway   Mallampati: III  TM distance: >3 FB  Neck ROM: full  No difficulty expected  Dental - normal exam     Pulmonary - normal exam   Cardiovascular   Exercise tolerance: good (4-7 METS)    Rhythm: regular  Rate: normal    (+) hyperlipidemia,       Neuro/Psych  (+) psychiatric history Depression,    GI/Hepatic/Renal/Endo    (+) obesity,   thyroid problem hypothyroidism    Musculoskeletal     (+) chronic pain, neck pain,   Abdominal   (+) obese,    Substance History   (+) alcohol use,      OB/GYN negative ob/gyn ROS         Other   arthritis,    history of cancer remission                    Anesthesia Plan    ASA 2     MAC     intravenous induction     Anesthetic plan, risks, benefits, and alternatives have been provided, discussed and informed consent has been obtained with: patient.    Use of blood products discussed with patient  Consented to blood products.       CODE STATUS:

## 2022-09-30 NOTE — ANESTHESIA POSTPROCEDURE EVALUATION
Patient: Cecily Broussard    Procedure Summary     Date: 09/30/22 Room / Location: [facility] ENDOSCOPY 1 /  [facility]    Anesthesia Start: 0852 Anesthesia Stop: 0924    Procedure: COLONOSCOPY (N/A ) Diagnosis:       Screening for colon cancer      (screening colon cancer)    Surgeons: Patrice Noel MD Provider: Vikram Ba CRNA    Anesthesia Type: MAC ASA Status: 2          Anesthesia Type: MAC    Vitals  Vitals Value Taken Time   /89 09/30/22 0926   Temp 97.6 °F (36.4 °C) 09/30/22 0926   Pulse 77 09/30/22 0926   Resp 16 09/30/22 0926   SpO2 97 % 09/30/22 0926           Post Anesthesia Care and Evaluation    Patient location during evaluation: PHASE II  Patient participation: complete - patient participated  Level of consciousness: awake  Pain management: adequate    Airway patency: patent  Anesthetic complications: No anesthetic complications  PONV Status: none  Cardiovascular status: acceptable  Respiratory status: acceptable  Hydration status: acceptable

## 2022-10-06 ENCOUNTER — HOSPITAL ENCOUNTER (OUTPATIENT)
Dept: PHYSICAL THERAPY | Facility: HOSPITAL | Age: 62
Setting detail: THERAPIES SERIES
Discharge: HOME | End: 2022-10-06

## 2022-10-06 ENCOUNTER — APPOINTMENT (OUTPATIENT)
Dept: PHYSICAL THERAPY | Facility: HOSPITAL | Age: 62
End: 2022-10-06

## 2022-10-06 DIAGNOSIS — G89.29 CHRONIC NECK PAIN: Primary | ICD-10-CM

## 2022-10-06 DIAGNOSIS — M54.2 CHRONIC NECK PAIN: Primary | ICD-10-CM

## 2022-10-06 PROCEDURE — 97035 APP MDLTY 1+ULTRASOUND EA 15: CPT

## 2022-10-06 PROCEDURE — 97012 MECHANICAL TRACTION THERAPY: CPT

## 2022-10-06 PROCEDURE — 97140 MANUAL THERAPY 1/> REGIONS: CPT

## 2022-10-06 NOTE — THERAPY RE-EVALUATION
Outpatient Physical Therapy Ortho Re-Evaluation  KORY Llamas     Patient Name: Cecily Broussard  : 1960  MRN: [id number]  Today's Date: 10/6/2022      Visit Date: 10/06/2022    Patient Active Problem List   Diagnosis   • Depression   • Hyperlipidemia   • Adult hypothyroidism   • Insomnia   • Low grade squamous intraepith lesion on cytologic smear cervix (lgsil)   • Menopausal disorder   • Arthralgia of multiple joints   • Osteoarthritis   • Positive anti-CCP test   • Vitamin D deficiency   • Healthcare maintenance   • History of colon polyps   • HPV (human papilloma virus) infection   • S/P conization of cervix   • Drug therapy   • Personal history of colonic polyps   • H/O abnormal cervical Papanicolaou smear   • Dysplasia of cervix, high grade REAGAN 2   • Large breasts   • Chronic neck pain        Past Medical History:   Diagnosis Date   • Abnormal Pap smear of cervix    • Arthritis    • Cervical dysplasia     REAGAN 1on bx, CKC with REAGAN 2, neg margins   • Depression    • Disease of thyroid gland    • Hyperlipidemia    • Menopause         Past Surgical History:   Procedure Laterality Date   • ABDOMINOPLASTY     • CERVICAL CONIZATION N/A 2020    Procedure: CERVICAL CONIZATION;  Surgeon: Queta Brandt MD;  Location: [facility];  Service: Obstetrics/Gynecology;  Laterality: N/A;   •  SECTION      x 1   • COLONOSCOPY     • COLONOSCOPY N/A 2022    Procedure: COLONOSCOPY;  Surgeon: Patrice Noel MD;  Location: [facility];  Service: Gastroenterology;  Laterality: N/A;   • LAPAROSCOPIC ASSISTED VAGINAL HYSTERECTOMY SALPINGO OOPHORECTOMY     • OOPHORECTOMY      RSO   • TOTAL ABDOMINAL HYSTERECTOMY WITH SALPINGO OOPHORECTOMY     • TOTAL LAPAROSCOPIC HYSTERECTOMY Right 10/19/2021    Procedure: TOTAL LAPAROSCOPIC HYSTERECTOMY, right salpingo-oophorectomy;  Surgeon: Queta Brandt MD;  Location: [facility];  Service: Obstetrics/Gynecology;  Laterality:  "Right;   • TUBAL ABDOMINAL LIGATION         Visit Dx:     ICD-10-CM ICD-9-CM   1. Chronic neck pain  M54.2 723.1    G89.29 338.29              PT Ortho     Row Name 10/06/22 0900       Subjective Comments    Subjective Comments Pt reports pain in B side of neck and \"I find that I wake up in the middle of the night and my neck really hurts.\" Pt reports that she does feel temporary relief with PT. \"I am going to have breast reduction surgery but it isn't scheduled yet.\" \"It probably won't be until next year.\"  No c/o any HA.  -LN       Subjective Pain    Pre-Treatment Pain Level 5  -LN    Subjective Pain Comment She reported that her neck felt better and \"much looser\" after session.  -LN       Cervical Palpation    Occiput Bilateral:;Tender;Guarded/taut  -LN    Suboccipital Bilateral:;Tender;Guarded/taut  -LN    Scalenes Bilateral:;Tender;Guarded/taut  -LN    Levator Scapula Bilateral:;Tender;Guarded/taut  -LN    Upper Traps Bilateral:;Tender;Guarded/taut  -LN    Middle Traps Bilateral:;Tender;Guarded/taut  -LN       General ROM    GENERAL ROM COMMENTS B UE AROM WFL-WNL  -LN       Head/Neck/Trunk    Neck Extension AROM WFL- painfree  -LN    Neck Flexion AROM WFL- pulling  -LN    Neck Lt Lateral Flexion AROM 25%  -LN    Neck Rt Lateral Flexion AROM 50%  -LN    Neck Lt Rotation AROM 50%- pain and tigh  -LN    Neck Rt Rotation AROM WFL  -LN       MMT Right Upper Ext    Rt Shoulder Flexion MMT, Gross Movement (5/5) normal  -LN    Rt Shoulder Extension MMT, Gross Movement (5/5) normal  -LN    Rt Shoulder ABduction MMT, Gross Movement (5/5) normal  -LN    Rt Shoulder ADduction MMT, Gross Movement (5/5) normal  -LN    Rt Shoulder Internal Rotation MMT, Gross Movement (5/5) normal  -LN    Rt Shoulder External Rotation MMT, Gross Movement (5/5) normal  -LN       MMT Left Upper Ext    Lt Shoulder Flexion MMT, Gross Movement (5/5) normal  -LN    Lt Shoulder Extension MMT, Gross Movement (5/5) normal  -LN    Lt Shoulder ABduction " MMT, Gross Movement (5/5) normal  -LN    Lt Shoulder ADduction MMT, Gross Movement (5/5) normal  -LN    Lt Shoulder Internal Rotation MMT, Gross Movement (5/5) normal  -LN    Lt Shoulder External Rotation MMT, Gross Movement (5/5) normal  -LN       Sensation    Sensation WNL? WNL  -LN    Light Touch No apparent deficits  -LN       Upper Extremity Flexibility    Suboccipitals Right:;Mildly limited;Left:;Moderately limited  -LN    Scalenes Bilateral:;Moderately limited  -LN    Upper Trapezius Bilateral:;Moderately limited  -LN    Levator Scapula Bilateral:;Moderately limited  -LN    Pect Minor Bilateral:;Mildly limited  -LN       Gait/Stairs (Locomotion)    Comment, (Gait/Stairs) Pt independent with all functional mobility and gait.  -LN          User Key  (r) = Recorded By, (t) = Taken By, (c) = Cosigned By    Initials Name Provider Type    Laura Cannon, PT Physical Therapist                            Therapy Education  Given: HEP, Symptoms/condition management, Posture/body mechanics  Program: Reinforced  How Provided: Verbal  Provided to: Patient  Level of Understanding: Verbalized      PT OP Goals     Row Name 10/06/22 0900          PT Short Term Goals    STG Date to Achieve 10/20/22  -LN     STG 1 Patient to verbally report decreased neck pain to <6/10 with ADLs & everyday activities.  -LN     STG 1 Progress Met  -LN     STG 1 Progress Comments Pt rates pain at 5/10 today.  -LN     STG 2 Pt independent with initial HEP issued by therapist.  -LN     STG 2 Progress Met  -LN     STG 3 CROM improved by 25% to allow her to move her neck better with driving.  -LN     STG 3 Progress Partially Met;Ongoing;Progressing  -LN     STG 3 Progress Comments met except for cervical L rotation and L lateral flexion; was improved after session.  -LN     STG 4 Pt to have decreased muscle guarding B cervical PS/scalenes/levator/UT/MT to minimal.  -LN     STG 4 Progress Partially Met;Ongoing;Progressing  -LN     STG 5  Pt to have decreased c/o tension HA by 25%-50%.  -LN     STG 5 Progress Met  -LN     STG 5 Progress Comments No c/o any MEJÍA.  -LN            Long Term Goals    LTG Date to Achieve 11/03/22  -LN     LTG 1 Patient to verbally report decreased neck pain to <4/10 with ADLs & everyday activities.  -LN     LTG 1 Progress Ongoing;Progressing  -LN     LTG 1 Progress Comments Pt rates pain at 5/10 today.  -LN     LTG 2 Pt independent with more advanced HEP issued by therapist.  -LN     LTG 2 Progress Ongoing;Progressing  -LN     LTG 3 CROM WFL and painfree all planes.  -LN     LTG 3 Progress Partially Met;Ongoing;Progressing  -LN     LTG 3 Progress Comments only met for cervical extension and R rotation.  -LN     LTG 4 Pt to have decreased muscle guarding B cervical PS/scalenes/levator/UT/MT to nominal.  -LN     LTG 4 Progress Ongoing;Progressing  -LN     LTG 5 Pt to have decreased c/o tension HA by 50%-75%.  -LN     LTG 5 Progress Met  -LN     LTG 5 Progress Comments no c/o any HA  -LN     LTG 6 Pt able to read for 30 minutes with no c/o any increased neck pain or tension HA.  -LN     LTG 6 Progress Ongoing;Progressing  -LN            Time Calculation    PT Goal Re-Cert Due Date 11/03/22  -LN           User Key  (r) = Recorded By, (t) = Taken By, (c) = Cosigned By    Initials Name Provider Type    Laura Cannon, PT Physical Therapist                 PT Assessment/Plan     Row Name 10/06/22 1000          PT Assessment    Assessment Comments Pt with overall decreased neck and UT pain reported but persists, manish left side of neck. CROM improved, but still limited manish in left rotation and left lateral flexion. Pt continues with min to mod muscle guarding B cervical PS, L>R and B UT/scalenes/levator, but does decrease after treatment. Pt no longer c/o any HA. She does seem to get benefit from PT, but only temporary. She needs a breast reduction, but is on waiting list to get it scheduled. Feel she will not be able to  be painfree until after having this done. She has met 3 out of 5 STG and has partially met 2 STG; she has met 1 LTG and partially met 1 LTG. She is making good progress towards remaining goals. Do feel she will benefit from continued skilled PT to progress with ROM and stretching exercises and continue modalities and cervical traction to get her pain level down & keep it down manish since it will be awhile before she can have her breast reduction ( 6 months or more due to waiting list at surgeon's office).  -LN            PT Plan    PT Frequency 2x/week;1x/week  -LN     Predicted Duration of Therapy Intervention (PT) 4 weeks  -LN     PT Plan Comments Continue per POC. Progress with exercises as tolerated. Modalities and cervical traction PRN. STM/MFR. pt education. Kinesiotape PRN. Continued posture education.  -LN           User Key  (r) = Recorded By, (t) = Taken By, (c) = Cosigned By    Initials Name Provider Type    LN Laura Pate, PT Physical Therapist                 Modalities     Row Name 10/06/22 0900             Precautions    Existing Precautions/Restrictions no known precautions/restrictions  -LN              Subjective Pain    Able to rate subjective pain? yes  -LN              Moist Heat    MH Applied Yes  -LN      Location MHP (cervical x2) - C-spine and mid back - pt supine with bolster under knees  -LN      PT Moist Heat Minutes 10  -LN      MH S/P Rx --  after manual, prior to traction  -LN              Ultrasound 26635    Location (B) UT/midscap area; (L) cervical PS  -LN      Duty Cycle 100  -LN      Frequency 1.0 MHz  -LN      Intensity - Wts/cm 1.5  -LN      38931 - PT Ultrasound Minutes 8  -LN              Traction 69621    Traction Type Cervical  -LN      PT Traction Rx Minutes 15  -LN      Duration Intermittent  -LN      Position Hook-lying  bolster under knees  -LN      Weight 15  -LN      Hold 60  -LN      Relax 20  -LN              Other Treatment Provided    Taping / Bracing No  "kinesiotape done today due to pt not feeling any difference with it on.  -LN            User Key  (r) = Recorded By, (t) = Taken By, (c) = Cosigned By    Initials Name Provider Type    Laura Cannon, PT Physical Therapist               OP Exercises     Row Name 10/06/22 0900             Precautions    Existing Precautions/Restrictions no known precautions/restrictions  -LN              Subjective Comments    Subjective Comments Pt reports pain in B side of neck and \"I find that I wake up in the middle of the night and my neck really hurts.\" Pt reports that she does feel temporary relief with PT. \"I am going to have breast reduction surgery but it isn't scheduled yet.\" \"It probably won't be until next year.\"  No c/o any HA.  -LN              Subjective Pain    Able to rate subjective pain? yes  -LN      Pre-Treatment Pain Level 5  -LN      Subjective Pain Comment She reported that her neck felt better and \"much looser\" after session.  -LN              Total Minutes    54647 - PT Manual Therapy Minutes 10  -LN              Exercise 1    Exercise Name 1 Verbal review of HEP  -LN            User Key  (r) = Recorded By, (t) = Taken By, (c) = Cosigned By    Initials Name Provider Type    Laura Cannon, PT Physical Therapist              Manual Rx (last 36 hours)     Manual Treatments     Row Name 10/06/22 0900             Total Minutes    17299 - PT Manual Therapy Minutes 10  -LN              Manual Rx 1    Manual Rx 1 Location (B) cervical PS/UT/medial scap border - pt seated with pillow support  -LN      Manual Rx 1 Type STM; trigger point; MFR  -LN      Manual Rx 1 Duration 10  -LN            User Key  (r) = Recorded By, (t) = Taken By, (c) = Cosigned By    Initials Name Provider Type    Laura Cannon, PT Physical Therapist                                      Time Calculation:     Start Time: 1000  Stop Time: 1115  Time Calculation (min): 75 min  Timed Charges  88855 - PT " Ultrasound Minutes: 8  96196 - PT Manual Therapy Minutes: 10  Untimed Charges  PT Traction Rx Minutes: 15  PT Moist Heat Minutes: 10  Total Minutes  Timed Charges Total Minutes: 18  Untimed Charges Total Minutes: 25   Total Minutes: 43     Therapy Charges for Today     Code Description Service Date Service Provider Modifiers Qty    65405933901 HC PT MANUAL THERAPY EA 15 MIN 10/6/2022 Laura Pate, PT GP 1    59368611487 HC PT TRACTION CERVICAL 10/6/2022 Laura Pate, PT GP 1    40703527737 HC PT ULTRASOUND EA 15 MIN 10/6/2022 Laura Pate, PT GP 1                    Laura Pate, PT  10/6/2022

## 2022-10-13 ENCOUNTER — HOSPITAL ENCOUNTER (OUTPATIENT)
Dept: PHYSICAL THERAPY | Facility: HOSPITAL | Age: 62
Setting detail: THERAPIES SERIES
Discharge: HOME | End: 2022-10-13

## 2022-10-13 DIAGNOSIS — M54.2 CHRONIC NECK PAIN: Primary | ICD-10-CM

## 2022-10-13 DIAGNOSIS — G89.29 CHRONIC NECK PAIN: Primary | ICD-10-CM

## 2022-10-13 PROCEDURE — 97140 MANUAL THERAPY 1/> REGIONS: CPT

## 2022-10-13 PROCEDURE — 97035 APP MDLTY 1+ULTRASOUND EA 15: CPT

## 2022-10-13 PROCEDURE — 97012 MECHANICAL TRACTION THERAPY: CPT

## 2022-10-13 NOTE — THERAPY TREATMENT NOTE
Outpatient Physical Therapy Ortho Treatment Note  K[facility] Llamas     Patient Name: Cecily Broussard  : 1960  MRN: [id number]  Today's Date: 10/13/2022      Visit Date: 10/13/2022    Visit Dx:    ICD-10-CM ICD-9-CM   1. Chronic neck pain  M54.2 723.1    G89.29 338.29       Patient Active Problem List   Diagnosis   • Depression   • Hyperlipidemia   • Adult hypothyroidism   • Insomnia   • Low grade squamous intraepith lesion on cytologic smear cervix (lgsil)   • Menopausal disorder   • Arthralgia of multiple joints   • Osteoarthritis   • Positive anti-CCP test   • Vitamin D deficiency   • Healthcare maintenance   • History of colon polyps   • HPV (human papilloma virus) infection   • S/P conization of cervix   • Drug therapy   • Personal history of colonic polyps   • H/O abnormal cervical Papanicolaou smear   • Dysplasia of cervix, high grade REAGAN 2   • Large breasts   • Chronic neck pain        Past Medical History:   Diagnosis Date   • Abnormal Pap smear of cervix    • Arthritis    • Cervical dysplasia 2016    REAGAN 1on bx, CKC with REAGAN 2, neg margins   • Depression    • Disease of thyroid gland    • Hyperlipidemia    • Menopause         Past Surgical History:   Procedure Laterality Date   • ABDOMINOPLASTY     • CERVICAL CONIZATION N/A 2020    Procedure: CERVICAL CONIZATION;  Surgeon: Queta Brandt MD;  Location: [facility];  Service: Obstetrics/Gynecology;  Laterality: N/A;   •  SECTION      x 1   • COLONOSCOPY     • COLONOSCOPY N/A 2022    Procedure: COLONOSCOPY;  Surgeon: Patrice Noel MD;  Location: [facility];  Service: Gastroenterology;  Laterality: N/A;   • LAPAROSCOPIC ASSISTED VAGINAL HYSTERECTOMY SALPINGO OOPHORECTOMY     • OOPHORECTOMY      RSO   • TOTAL ABDOMINAL HYSTERECTOMY WITH SALPINGO OOPHORECTOMY     • TOTAL LAPAROSCOPIC HYSTERECTOMY Right 10/19/2021    Procedure: TOTAL LAPAROSCOPIC HYSTERECTOMY, right salpingo-oophorectomy;  Surgeon:  Queta rBandt MD;  Location: [facility];  Service: Obstetrics/Gynecology;  Laterality: Right;   • TUBAL ABDOMINAL LIGATION          PT Ortho     Row Name 10/13/22 1300       Subjective Comments    Subjective Comments pt continues to complain of (B) tightness and discomfort with (L) > (R) and especially into (L) side of neck; overall feels ROM (R) has improved but no lasting changes (L)  -          User Key  (r) = Recorded By, (t) = Taken By, (c) = Cosigned By    Initials Name Provider Type     Luke Cantor PTA Physical Therapist Assistant                             PT Assessment/Plan     Row Name 10/13/22 1551          PT Assessment    Assessment Comments pt continues to have decreased symptoms following treatment as well as improved cervical AROM but limited carryover to next treatment (L); slowly making gains with sympotms and ROM (R)  -        PT Plan    PT Plan Comments Cont per POC  -           User Key  (r) = Recorded By, (t) = Taken By, (c) = Cosigned By    Initials Name Provider Type     Luke Cantor PTA Physical Therapist Assistant                 Modalities     Row Name 10/13/22 1300             Precautions    Existing Precautions/Restrictions no known precautions/restrictions  -         Moist Heat    Location MHP (cervical x2) - C-spine and upper/mid back - pt supine with bolster under knees  -      PT Moist Heat Minutes 10  -MH       S/P Rx --  after manual, prior to traction  -         Ultrasound 29582    Location (B) UT/midscap area; (L) cervical PS  -      Duty Cycle 100  -MH      Frequency 1.0 MHz  -      Intensity - Wts/cm 1.5  -      68983 - PT Ultrasound Minutes 8  -         Traction 38345    Traction Type Cervical  -      PT Traction Rx Minutes 15  -MH      Position Hook-lying  bolster under knees  -      Weight 15  -MH      Hold 60  -MH      Relax 20  -MH            User Key  (r) = Recorded By, (t) = Taken By, (c) = Cosigned By    Initials Name  Provider Type     Luke Cantor PTA Physical Therapist Assistant               OP Exercises     Row Name 10/13/22 1553 10/13/22 1300          Precautions    Existing Precautions/Restrictions -- no known precautions/restrictions  -        Subjective Comments    Subjective Comments -- pt continues to complain of (B) tightness and discomfort with (L) > (R) and especially into (L) side of neck; overall feels ROM (R) has improved but no lasting changes (L)  [facility]        Total Minutes    30577 - PT Manual Therapy Minutes 10  - --           User Key  (r) = Recorded By, (t) = Taken By, (c) = Cosigned By    Initials Name Provider Type     Devaughn Luke Bal PTA Physical Therapist Assistant                         Manual Rx (last 36 hours)     Manual Treatments     Row Name 10/13/22 1553 10/13/22 1300          Total Minutes    62330 - PT Manual Therapy Minutes 10  - --        Manual Rx 1    Manual Rx 1 Location -- (B) cervical PS/UT/medial scap border - pt seated with pillow support  -     Manual Rx 1 Type -- STM; trigger point; MFR  -     Manual Rx 1 Duration -- 10  -           User Key  (r) = Recorded By, (t) = Taken By, (c) = Cosigned By    Initials Name Provider Type     Devaughn Luke Bal PTA Physical Therapist Assistant                    Therapy Education  Given: HEP, Symptoms/condition management  Program: Reinforced  How Provided: Verbal  Provided to: Patient  Level of Understanding: Verbalized              Time Calculation:   Start Time: 1258  Stop Time: 1413  Time Calculation (min): 75 min  Timed Charges  03114 - PT Ultrasound Minutes: 8  83700 - PT Manual Therapy Minutes: 10  Untimed Charges  PT Traction Rx Minutes: 15  PT Moist Heat Minutes: 10  Total Minutes  Timed Charges Total Minutes: 10  Untimed Charges Total Minutes: 25   Total Minutes: 10  Therapy Charges for Today     Code Description Service Date Service Provider Modifiers Qty    26695860527 HC PT MANUAL THERAPY EA 15 MIN 10/13/2022 Devaughn  Luke Bal, PTA GP 1    32475976408 HC PT TRACTION CERVICAL 10/13/2022 Luke Cantor, PTA GP 1    46607765040 HC PT ULTRASOUND EA 15 MIN 10/13/2022 Luke Cantor, PTA GP 1                    Luke Cantor, EVAN  10/13/2022

## 2022-10-20 ENCOUNTER — HOSPITAL ENCOUNTER (OUTPATIENT)
Dept: PHYSICAL THERAPY | Facility: HOSPITAL | Age: 62
Setting detail: THERAPIES SERIES
Discharge: HOME | End: 2022-10-20

## 2022-10-20 DIAGNOSIS — G89.29 CHRONIC NECK PAIN: Primary | ICD-10-CM

## 2022-10-20 DIAGNOSIS — M54.2 CHRONIC NECK PAIN: Primary | ICD-10-CM

## 2022-10-20 PROCEDURE — 97035 APP MDLTY 1+ULTRASOUND EA 15: CPT

## 2022-10-20 PROCEDURE — 97012 MECHANICAL TRACTION THERAPY: CPT

## 2022-10-20 PROCEDURE — 97140 MANUAL THERAPY 1/> REGIONS: CPT

## 2022-10-20 NOTE — THERAPY TREATMENT NOTE
Outpatient Physical Therapy Ortho Treatment Note  KORY Llamas     Patient Name: Cecily Broussard  : 1960  MRN: [id number]  Today's Date: 10/20/2022      Visit Date: 10/20/2022    Visit Dx:    ICD-10-CM ICD-9-CM   1. Chronic neck pain  M54.2 723.1    G89.29 338.29       Patient Active Problem List   Diagnosis   • Depression   • Hyperlipidemia   • Adult hypothyroidism   • Insomnia   • Low grade squamous intraepith lesion on cytologic smear cervix (lgsil)   • Menopausal disorder   • Arthralgia of multiple joints   • Osteoarthritis   • Positive anti-CCP test   • Vitamin D deficiency   • Healthcare maintenance   • History of colon polyps   • HPV (human papilloma virus) infection   • S/P conization of cervix   • Drug therapy   • Personal history of colonic polyps   • H/O abnormal cervical Papanicolaou smear   • Dysplasia of cervix, high grade REAGAN 2   • Large breasts   • Chronic neck pain        Past Medical History:   Diagnosis Date   • Abnormal Pap smear of cervix    • Arthritis    • Cervical dysplasia 2016    REAGAN 1on bx, CKC with REAGAN 2, neg margins   • Depression    • Disease of thyroid gland    • Hyperlipidemia    • Menopause         Past Surgical History:   Procedure Laterality Date   • ABDOMINOPLASTY     • CERVICAL CONIZATION N/A 2020    Procedure: CERVICAL CONIZATION;  Surgeon: Queta Brandt MD;  Location: [facility];  Service: Obstetrics/Gynecology;  Laterality: N/A;   •  SECTION      x 1   • COLONOSCOPY     • COLONOSCOPY N/A 2022    Procedure: COLONOSCOPY;  Surgeon: Patrice Noel MD;  Location: [facility];  Service: Gastroenterology;  Laterality: N/A;   • LAPAROSCOPIC ASSISTED VAGINAL HYSTERECTOMY SALPINGO OOPHORECTOMY     • OOPHORECTOMY      RSO   • TOTAL ABDOMINAL HYSTERECTOMY WITH SALPINGO OOPHORECTOMY     • TOTAL LAPAROSCOPIC HYSTERECTOMY Right 10/19/2021    Procedure: TOTAL LAPAROSCOPIC HYSTERECTOMY, right salpingo-oophorectomy;  Surgeon:  Queta Brandt MD;  Location: [facility];  Service: Obstetrics/Gynecology;  Laterality: Right;   • TUBAL ABDOMINAL LIGATION          PT Ortho     Row Name 10/20/22 0900       Subjective Comments    Subjective Comments pt states she has felt better since last session; still with some pain especially (L) side of neck but improved ROM and decreased overall symptoms until the past day or so and unsure why symptoms returned  -       Subjective Pain    Able to rate subjective pain? yes  -    Pre-Treatment Pain Level 5  -          User Key  (r) = Recorded By, (t) = Taken By, (c) = Cosigned By    Initials Name Provider Type     Luke Cantor PTA Physical Therapist Assistant                             PT Assessment/Plan     Row Name 10/20/22 0957          PT Assessment    Assessment Comments pt continues with [facility] as instructed and reporting decreased pain with increased ROM until the past couple of days - no apparent cause for return of symptoms; minimal to no discomfort (R) unless being palpated  -        PT Plan    PT Plan Comments Cont per POC  -           User Key  (r) = Recorded By, (t) = Taken By, (c) = Cosigned By    Initials Name Provider Type     Luke Cantor PTA Physical Therapist Assistant                 Modalities     Row Name 10/20/22 0900             Precautions    Existing Precautions/Restrictions no known precautions/restrictions  -         Moist Heat    Location MHP (cervical x2) - C-spine and upper/mid back - pt supine with bolster under knees  -      PT Moist Heat Minutes 10  -[facility] S/P Rx --  after manual, prior to traction  -         Ultrasound 37948    Location (B) UT/midscap area; (L) cervical PS  -      Duty Cycle 100  -      Frequency 1.0 MHz  -      Intensity - Wts/cm 1.5  -      24029 - PT Ultrasound Minutes 8  -         Traction 48722    Traction Type Cervical  -      PT Traction Rx Minutes 15  -      Position Hook-lying  bolster under  knees  -      Weight 17  -MH      Hold 60  -MH      Relax 20  -MH            User Key  (r) = Recorded By, (t) = Taken By, (c) = Cosigned By    Initials Name Provider Type     Luke Cantor PTA Physical Therapist Assistant               OP Exercises     Row Name 10/20/22 1007 10/20/22 0900          Precautions    Existing Precautions/Restrictions -- no known precautions/restrictions  -        Subjective Comments    Subjective Comments -- pt states she has felt better since last session; still with some pain especially (L) side of neck but improved ROM and decreased overall symptoms until the past day or so and unsure why symptoms returned  -        Subjective Pain    Able to rate subjective pain? -- yes  -     Pre-Treatment Pain Level -- 5  -MH        Total Minutes    23824 - PT Manual Therapy Minutes 10  -MH --           User Key  (r) = Recorded By, (t) = Taken By, (c) = Cosigned By    Initials Name Provider Type     Luke Cantor PTA Physical Therapist Assistant                         Manual Rx (last 36 hours)     Manual Treatments     Row Name 10/20/22 1007 10/20/22 0900          Total Minutes    41872 - PT Manual Therapy Minutes 10  -MH --        Manual Rx 1    Manual Rx 1 Location -- (B) cervical PS/UT/medial scap border - pt seated with pillow support  -     Manual Rx 1 Type -- STM; trigger point; MFR  -     Manual Rx 1 Duration -- 10  -MH           User Key  (r) = Recorded By, (t) = Taken By, (c) = Cosigned By    Initials Name Provider Type     Luke Cantor PTA Physical Therapist Assistant                    Therapy Education  Given: HEP, Symptoms/condition management  Program: Reinforced  How Provided: Verbal  Provided to: Patient  Level of Understanding: Verbalized              Time Calculation:   Start Time: 0855  Stop Time: 0954  Time Calculation (min): 59 min  Timed Charges  23984 - PT Ultrasound Minutes: 8  37125 - PT Manual Therapy Minutes: 10  Untimed Charges  PT Traction  Rx Minutes: 15  PT Moist Heat Minutes: 10  Total Minutes  Timed Charges Total Minutes: 10  Untimed Charges Total Minutes: 25   Total Minutes: 10  Therapy Charges for Today     Code Description Service Date Service Provider Modifiers Qty    50096543419 HC PT MANUAL THERAPY EA 15 MIN 10/20/2022 Luke Cantor PTA GP 1    67367253023 HC PT ULTRASOUND EA 15 MIN 10/20/2022 Luke Cantor PTA GP 1    40972530312 HC PT TRACTION CERVICAL 10/20/2022 Luke Cantor PTA GP 1                    Luke Cantor PTA  10/20/2022

## 2022-10-28 DIAGNOSIS — E78.2 MIXED HYPERLIPIDEMIA: ICD-10-CM

## 2022-10-31 RX ORDER — EZETIMIBE 10 MG/1
TABLET ORAL
Qty: 90 TABLET | Refills: 2 | Status: SHIPPED | OUTPATIENT
Start: 2022-10-31

## 2022-11-07 ENCOUNTER — HOSPITAL ENCOUNTER (OUTPATIENT)
Dept: PHYSICAL THERAPY | Facility: HOSPITAL | Age: 62
Setting detail: THERAPIES SERIES
Discharge: HOME | End: 2022-11-07

## 2022-11-07 DIAGNOSIS — M54.2 CHRONIC NECK PAIN: Primary | ICD-10-CM

## 2022-11-07 DIAGNOSIS — G89.29 CHRONIC NECK PAIN: Primary | ICD-10-CM

## 2022-11-07 PROCEDURE — 97140 MANUAL THERAPY 1/> REGIONS: CPT

## 2022-11-07 PROCEDURE — 97012 MECHANICAL TRACTION THERAPY: CPT

## 2022-11-07 PROCEDURE — 97035 APP MDLTY 1+ULTRASOUND EA 15: CPT

## 2022-11-07 NOTE — THERAPY TREATMENT NOTE
Outpatient Physical Therapy Ortho Treatment Note  K[facility] Llamas     Patient Name: Cecily Broussard  : 1960  MRN: [id number]  Today's Date: 2022      Visit Date: 2022    Visit Dx:    ICD-10-CM ICD-9-CM   1. Chronic neck pain  M54.2 723.1    G89.29 338.29       Patient Active Problem List   Diagnosis   • Depression   • Hyperlipidemia   • Adult hypothyroidism   • Insomnia   • Low grade squamous intraepith lesion on cytologic smear cervix (lgsil)   • Menopausal disorder   • Arthralgia of multiple joints   • Osteoarthritis   • Positive anti-CCP test   • Vitamin D deficiency   • Healthcare maintenance   • History of colon polyps   • HPV (human papilloma virus) infection   • S/P conization of cervix   • Drug therapy   • Personal history of colonic polyps   • H/O abnormal cervical Papanicolaou smear   • Dysplasia of cervix, high grade REAGAN 2   • Large breasts   • Chronic neck pain        Past Medical History:   Diagnosis Date   • Abnormal Pap smear of cervix    • Arthritis    • Cervical dysplasia 2016    REAGAN 1on bx, CKC with REAGAN 2, neg margins   • Depression    • Disease of thyroid gland    • Hyperlipidemia    • Menopause         Past Surgical History:   Procedure Laterality Date   • ABDOMINOPLASTY     • CERVICAL CONIZATION N/A 2020    Procedure: CERVICAL CONIZATION;  Surgeon: Queta Brandt MD;  Location: [facility];  Service: Obstetrics/Gynecology;  Laterality: N/A;   •  SECTION      x 1   • COLONOSCOPY     • COLONOSCOPY N/A 2022    Procedure: COLONOSCOPY;  Surgeon: Patrice Noel MD;  Location: [facility];  Service: Gastroenterology;  Laterality: N/A;   • LAPAROSCOPIC ASSISTED VAGINAL HYSTERECTOMY SALPINGO OOPHORECTOMY     • OOPHORECTOMY      RSO   • TOTAL ABDOMINAL HYSTERECTOMY WITH SALPINGO OOPHORECTOMY     • TOTAL LAPAROSCOPIC HYSTERECTOMY Right 10/19/2021    Procedure: TOTAL LAPAROSCOPIC HYSTERECTOMY, right salpingo-oophorectomy;  Surgeon:  "Queta Brandt MD;  Location: [facility];  Service: Obstetrics/Gynecology;  Laterality: Right;   • TUBAL ABDOMINAL LIGATION          PT Ortho     Row Name 11/07/22 1100       Subjective Comments    Subjective Comments Pt stated that she still has pain in B OA area and \"that affects my neck motion.\" No HA. Pain primarily on left side of OA area and then radiates into shoulder/UT. Still no word when her surgery is going to be (breast reduction).  -LN       Subjective Pain    Able to rate subjective pain? yes  -LN    Pre-Treatment Pain Level 5  -LN          User Key  (r) = Recorded By, (t) = Taken By, (c) = Cosigned By    Initials Name Provider Type    Laura Cannon, PT Physical Therapist                             PT Assessment/Plan     Row Name 11/07/22 1100          PT Assessment    Assessment Comments Pt continues with persistent OA pain, L>R that radiates primarily into left UT/shoulder area. No c/o any HA. Moderate muscle guarding palpated in B UT and B cervical PS and left OA area; minimal R OA area; decreased after session; multiple trigger points palpated. She tolerated treatment well and she is doing well with her HEP.  She reports feeling much better after session. -LN        PT Plan    PT Frequency 2x/week;1x/week  -LN     PT Plan Comments Cont per POC; continue with her HEP and doing home SOR as tolerated.  -LN           User Key  (r) = Recorded By, (t) = Taken By, (c) = Cosigned By    Initials Name Provider Type    Laura Cannon, PT Physical Therapist                 Modalities     Row Name 11/07/22 1100             Moist Heat    MH Applied Yes  -LN      Location MHP (cervical x2) - C-spine and upper/mid back - pt supine with bolster under knees  -LN      PT Moist Heat Minutes 10  -LN      MH S/P Rx --  after manual, prior to traction  -LN         Ultrasound 14204    Location (B) UT/midscap area; (B) cervical PS; (B) OA area; L>R  -LN      Duty Cycle 100  -LN      " "Frequency 1.0 MHz  -LN      Intensity - Wts/cm 1.5  -LN      02577 - PT Ultrasound Minutes 8  -LN         Traction 04146    Traction Type Cervical  -LN      PT Traction Rx Minutes 15  -LN      Duration Intermittent  -LN      Position Hook-lying  bolster under knees  -LN      Weight 17  -LN      Hold 60  -LN      Relax 20  -LN            User Key  (r) = Recorded By, (t) = Taken By, (c) = Cosigned By    Initials Name Provider Type    Laura Cannon, PT Physical Therapist               OP Exercises     Row Name 11/07/22 1100             Precautions    Existing Precautions/Restrictions no known precautions/restrictions  -LN         Subjective Comments    Subjective Comments Pt stated that she still has pain in B OA area and \"that affects my neck motion.\" No HA. Pain primarily on left side of OA area and then radiates into shoulder/UT. Still no word when her surgery is going to be (breast reduction).  -LN         Subjective Pain    Able to rate subjective pain? yes  -LN      Pre-Treatment Pain Level 5  -LN         Total Minutes    77936 - PT Manual Therapy Minutes 10  -LN         Exercise 1    Exercise Name 1 Verbal review of HEP  -LN            User Key  (r) = Recorded By, (t) = Taken By, (c) = Cosigned By    Initials Name Provider Type    Laura Cannon, PT Physical Therapist                         Manual Rx (last 36 hours)     Manual Treatments     Row Name 11/07/22 1100             Total Minutes    29010 - PT Manual Therapy Minutes 10  -LN         Manual Rx 1    Manual Rx 1 Location (B) cervical PS/UT/OA areas/medial scap border - pt seated with pillow support  -LN      Manual Rx 1 Type STM; trigger point; MFR  -LN      Manual Rx 1 Duration 10  -LN            User Key  (r) = Recorded By, (t) = Taken By, (c) = Cosigned By    Initials Name Provider Type    Laura Cannon, PT Physical Therapist                    Therapy Education  Given: HEP, Symptoms/condition management, Pain " management  Program: Reinforced  How Provided: Verbal  Provided to: Patient  Level of Understanding: Verbalized              Time Calculation:   Start Time: 1102  Stop Time: 1207  Time Calculation (min): 65 min  Timed Charges  60762 - PT Ultrasound Minutes: 8  06469 - PT Manual Therapy Minutes: 10  Untimed Charges  PT Traction Rx Minutes: 15  PT Moist Heat Minutes: 10  Total Minutes  Timed Charges Total Minutes: 18  Untimed Charges Total Minutes: 25   Total Minutes: 43  Therapy Charges for Today     Code Description Service Date Service Provider Modifiers Qty    55595811975 HC PT MANUAL THERAPY EA 15 MIN 11/7/2022 Laura Pate, PT GP 1    27054433925 HC PT TRACTION CERVICAL 11/7/2022 Laura Pate, PT GP 1    29134666318 HC PT ULTRASOUND EA 15 MIN 11/7/2022 Laura Pate, PT GP 1                    Laura Pate, PT  11/7/2022

## 2022-11-18 ENCOUNTER — HOSPITAL ENCOUNTER (OUTPATIENT)
Dept: PHYSICAL THERAPY | Facility: HOSPITAL | Age: 62
Setting detail: THERAPIES SERIES
Discharge: HOME | End: 2022-11-18

## 2022-11-18 DIAGNOSIS — G89.29 CHRONIC NECK PAIN: Primary | ICD-10-CM

## 2022-11-18 DIAGNOSIS — M54.2 CHRONIC NECK PAIN: Primary | ICD-10-CM

## 2022-11-18 PROCEDURE — 97140 MANUAL THERAPY 1/> REGIONS: CPT

## 2022-11-18 PROCEDURE — 97035 APP MDLTY 1+ULTRASOUND EA 15: CPT

## 2022-11-18 PROCEDURE — 97012 MECHANICAL TRACTION THERAPY: CPT

## 2022-11-18 NOTE — THERAPY TREATMENT NOTE
Outpatient Physical Therapy Ortho Treatment Note  KORY Llamas     Patient Name: Cecily Broussard  : 1960  MRN: [id number]  Today's Date: 2022      Visit Date: 2022    Visit Dx:    ICD-10-CM ICD-9-CM   1. Chronic neck pain  M54.2 723.1    G89.29 338.29       Patient Active Problem List   Diagnosis   • Depression   • Hyperlipidemia   • Adult hypothyroidism   • Insomnia   • Low grade squamous intraepith lesion on cytologic smear cervix (lgsil)   • Menopausal disorder   • Arthralgia of multiple joints   • Osteoarthritis   • Positive anti-CCP test   • Vitamin D deficiency   • Healthcare maintenance   • History of colon polyps   • HPV (human papilloma virus) infection   • S/P conization of cervix   • Drug therapy   • Personal history of colonic polyps   • H/O abnormal cervical Papanicolaou smear   • Dysplasia of cervix, high grade REAGAN 2   • Large breasts   • Chronic neck pain        Past Medical History:   Diagnosis Date   • Abnormal Pap smear of cervix    • Arthritis    • Cervical dysplasia 2016    REAGAN 1on bx, CKC with REAGAN 2, neg margins   • Depression    • Disease of thyroid gland    • Hyperlipidemia    • Menopause         Past Surgical History:   Procedure Laterality Date   • ABDOMINOPLASTY     • CERVICAL CONIZATION N/A 2020    Procedure: CERVICAL CONIZATION;  Surgeon: Queta Brandt MD;  Location: [facility];  Service: Obstetrics/Gynecology;  Laterality: N/A;   •  SECTION      x 1   • COLONOSCOPY     • COLONOSCOPY N/A 2022    Procedure: COLONOSCOPY;  Surgeon: Patrice Noel MD;  Location: [facility];  Service: Gastroenterology;  Laterality: N/A;   • LAPAROSCOPIC ASSISTED VAGINAL HYSTERECTOMY SALPINGO OOPHORECTOMY     • OOPHORECTOMY      RSO   • TOTAL ABDOMINAL HYSTERECTOMY WITH SALPINGO OOPHORECTOMY     • TOTAL LAPAROSCOPIC HYSTERECTOMY Right 10/19/2021    Procedure: TOTAL LAPAROSCOPIC HYSTERECTOMY, right salpingo-oophorectomy;  Surgeon:  "Queta Brandt MD;  [facility];  Service: Obstetrics/Gynecology;  Laterality: Right;   • TUBAL ABDOMINAL LIGATION          PT Ortho     Row Name 11/18/22 1100       Subjective Comments    Subjective Comments \"I still feel pain in the left side of my neck; its the worst.\" \"I can feel how tight it is when I turn my neck.\" Still with pain in B OA area, L>R. No HA. \"The therapy & the traction do help.\"  \"I still haven't heard when my surgery is going to be.\" -LN       Precautions and Contraindications    Precautions/Limitations no known precautions/limitations  -LN       Subjective Pain    Able to rate subjective pain? yes  -LN    Pre-Treatment Pain Level 6  -LN          User Key  (r) = Recorded By, (t) = Taken By, (c) = Cosigned By    Initials Name Provider Type    Laura Cannon, PT Physical Therapist                             PT Assessment/Plan     Row Name 11/18/22 1100          PT Assessment    Assessment Comments Pt continues with persistent OA pain, L>R and pain and tightness into B UT, L>R.  She does report relief with PT and traction. Pt noted to have moderate muscle guarding B cervical PS/suboccipital areas/UT; decreased to minimal after session. She is doing well with HEP.  -LN        PT Plan    PT Frequency 2x/week;1x/week  -LN     PT Plan Comments Cont per POC; continue with her HEP and doing home SOR as tolerated.  -LN           User Key  (r) = Recorded By, (t) = Taken By, (c) = Cosigned By    Initials Name Provider Type    Laura Cannon, PT Physical Therapist                 Modalities     Row Name 11/18/22 1100             Precautions    Existing Precautions/Restrictions no known precautions/restrictions  -LN         Moist Heat    MH Applied Yes  -LN      Location MHP (cervical x2) - C-spine and upper/mid back - pt supine with bolster under knees  -LN      PT Moist Heat Minutes 10  -LN      MH S/P Rx --  after manual, prior to traction  -LN         " "Ultrasound 31531    Location (B) UT/midscap area; (B) cervical PS; (B) OA area; L>R  -LN      Duty Cycle 100  -LN      Frequency 1.0 MHz  -LN      Intensity - Wts/cm 1.5  -LN      97399 - PT Ultrasound Minutes 8  -LN         Traction 51232    Traction Type Cervical  used newer traction machine today  -LN      PT Traction Rx Minutes 15  -LN      Position Hook-lying  bolster under knees  -LN      Weight 19  -LN      Hold 60  -LN      Relax 20  -LN            User Key  (r) = Recorded By, (t) = Taken By, (c) = Cosigned By    Initials Name Provider Type    Laura Cannon, PT Physical Therapist               OP Exercises     Row Name 11/18/22 1100             Precautions    Existing Precautions/Restrictions no known precautions/restrictions  -LN         Subjective Comments    Subjective Comments \"I still feel pain in the left side of my neck; its the worst.\" \"I can feel how tight it is when I turn my neck.\" Still with pain in B OA area, L>R. No HA. \"The therapy & the traction do help.\"  -LN         Subjective Pain    Able to rate subjective pain? yes  -LN      Pre-Treatment Pain Level 6  -LN         Total Minutes    16726 - PT Manual Therapy Minutes 10  -LN         Exercise 1    Exercise Name 1 Verbal review of HEP  -LN            User Key  (r) = Recorded By, (t) = Taken By, (c) = Cosigned By    Initials Name Provider Type    Laura Cannon, PT Physical Therapist                         Manual Rx (last 36 hours)     Manual Treatments     Row Name 11/18/22 1100             Total Minutes    47968 - PT Manual Therapy Minutes 10  -LN         Manual Rx 1    Manual Rx 1 Location (B) cervical PS/OA area/UT/medial scap border - pt seated with pillow support  -LN      Manual Rx 1 Type STM; trigger point; MFR  -LN      Manual Rx 1 Duration 10  -LN            User Key  (r) = Recorded By, (t) = Taken By, (c) = Cosigned By    Initials Name Provider Type    Laura Cannon, PT Physical Therapist       "              Therapy Education  Education Details: Pt to pay attention over next couple days if this traction machine did any better than the one we usually use and will use whichever one gives her the best benefit.  Given: HEP, Symptoms/condition management, Pain management  Program: Reinforced  How Provided: Verbal  Provided to: Patient  Level of Understanding: Verbalized              Time Calculation:   Start Time: 1100  Stop Time: 1205  Time Calculation (min): 65 min  Timed Charges  37792 - PT Ultrasound Minutes: 8  70591 - PT Manual Therapy Minutes: 10  Untimed Charges  PT Traction Rx Minutes: 15  PT Moist Heat Minutes: 10  Total Minutes  Timed Charges Total Minutes: 18  Untimed Charges Total Minutes: 25   Total Minutes: 43  Therapy Charges for Today     Code Description Service Date Service Provider Modifiers Qty    77101459175 HC PT ULTRASOUND EA 15 MIN 11/18/2022 Laura Pate, PT GP 1    86829027080 HC PT MANUAL THERAPY EA 15 MIN 11/18/2022 Laura Pate, PT GP 1    29642080850 HC PT TRACTION CERVICAL 11/18/2022 Laura Pate, PT GP 1                    Laura Pate, PT  11/18/2022

## 2022-11-23 ENCOUNTER — HOSPITAL ENCOUNTER (OUTPATIENT)
Dept: PHYSICAL THERAPY | Facility: HOSPITAL | Age: 62
Setting detail: THERAPIES SERIES
Discharge: HOME | End: 2022-11-23

## 2022-11-23 DIAGNOSIS — M54.2 CHRONIC NECK PAIN: Primary | ICD-10-CM

## 2022-11-23 DIAGNOSIS — G89.29 CHRONIC NECK PAIN: Primary | ICD-10-CM

## 2022-11-23 PROCEDURE — 97035 APP MDLTY 1+ULTRASOUND EA 15: CPT

## 2022-11-23 PROCEDURE — 97140 MANUAL THERAPY 1/> REGIONS: CPT

## 2022-11-23 PROCEDURE — 97012 MECHANICAL TRACTION THERAPY: CPT

## 2022-11-23 NOTE — THERAPY TREATMENT NOTE
Outpatient Physical Therapy Ortho Treatment Note  KORY Llamas     Patient Name: Cecily Broussard  : 1960  MRN: [id number]  Today's Date: 2022      Visit Date: 2022    Visit Dx:    ICD-10-CM ICD-9-CM   1. Chronic neck pain  M54.2 723.1    G89.29 338.29       Patient Active Problem List   Diagnosis   • Depression   • Hyperlipidemia   • Adult hypothyroidism   • Insomnia   • Low grade squamous intraepith lesion on cytologic smear cervix (lgsil)   • Menopausal disorder   • Arthralgia of multiple joints   • Osteoarthritis   • Positive anti-CCP test   • Vitamin D deficiency   • Healthcare maintenance   • History of colon polyps   • HPV (human papilloma virus) infection   • S/P conization of cervix   • Drug therapy   • Personal history of colonic polyps   • H/O abnormal cervical Papanicolaou smear   • Dysplasia of cervix, high grade REAGAN 2   • Large breasts   • Chronic neck pain        Past Medical History:   Diagnosis Date   • Abnormal Pap smear of cervix    • Arthritis    • Cervical dysplasia 2016    REAGAN 1on bx, CKC with REAGAN 2, neg margins   • Depression    • Disease of thyroid gland    • Hyperlipidemia    • Menopause         Past Surgical History:   Procedure Laterality Date   • ABDOMINOPLASTY     • CERVICAL CONIZATION N/A 2020    Procedure: CERVICAL CONIZATION;  Surgeon: Queta Brandt MD;  Location: [facility];  Service: Obstetrics/Gynecology;  Laterality: N/A;   •  SECTION      x 1   • COLONOSCOPY     • COLONOSCOPY N/A 2022    Procedure: COLONOSCOPY;  Surgeon: Patrice Noel MD;  Location: [facility];  Service: Gastroenterology;  Laterality: N/A;   • LAPAROSCOPIC ASSISTED VAGINAL HYSTERECTOMY SALPINGO OOPHORECTOMY     • OOPHORECTOMY      RSO   • TOTAL ABDOMINAL HYSTERECTOMY WITH SALPINGO OOPHORECTOMY     • TOTAL LAPAROSCOPIC HYSTERECTOMY Right 10/19/2021    Procedure: TOTAL LAPAROSCOPIC HYSTERECTOMY, right salpingo-oophorectomy;  Surgeon:  "Queta Brandt MD;  Location: [facility];  Service: Obstetrics/Gynecology;  Laterality: Right;   • TUBAL ABDOMINAL LIGATION          PT Ortho     Row Name 11/23/22 1100       Subjective Comments    Subjective Comments \"It still bothers me on my left side.\" She reports about 1 day relief after PT \"but no matter what I do, the left side still bothers me.\" Pt c/o pain in left UT/left side of neck and left OA area. \"I do think that the traction helps me.\" She still hasn't heard anything about when surgery will be scheduled.  -LN       Precautions and Contraindications    Precautions/Limitations no known precautions/limitations  -LN       Subjective Pain    Able to rate subjective pain? yes  -LN    Pre-Treatment Pain Level 5  5-6/10  -LN          User Key  (r) = Recorded By, (t) = Taken By, (c) = Cosigned By    Initials Name Provider Type    Laura Cannon, PT Physical Therapist                             PT Assessment/Plan     Row Name 11/23/22 1100          PT Assessment    Assessment Comments Pt continues with c/o pain left UT and into left side of neck and left OA area, but overall muscle guarding seemed less today. She does report benefit from PT/cervical traction. She is doing well with HEP.  -LN        PT Plan    PT Frequency 1x/week  -LN     Predicted Duration of Therapy Intervention (PT) trial of every other week  -LN     PT Plan Comments Cont per POC; continue with her HEP and doing home SOR as tolerated.  Next appointment is 12/7/22 per pt request.  -LN           User Key  (r) = Recorded By, (t) = Taken By, (c) = Cosigned By    Initials Name Provider Type    Laura Cannon, PT Physical Therapist                 Modalities     Row Name 11/23/22 1100             Precautions    Existing Precautions/Restrictions no known precautions/restrictions  -LN         Moist Heat    MH Applied Yes  -LN      Location MHP (cervical x2) - C-spine and upper/mid back - pt supine with bolster " "under knees  -LN      PT Moist Heat Minutes 12  -LN      MH S/P Rx --  after manual, prior to traction  -LN         Ultrasound 48870    Location (B) UT/midscap area; (B) cervical PS; (B) OA area; L>R  -LN      Duty Cycle 100  -LN      Frequency 1.0 MHz  -LN      Intensity - Wts/cm 1.5  -LN      50225 - PT Ultrasound Minutes 8  -LN         Traction 05254    Traction Type Cervical  -LN      PT Traction Rx Minutes 15  -LN      Position Hook-lying  bolster under knees  -LN      Weight 19  -LN      Hold 60  -LN      Relax 20  -LN            User Key  (r) = Recorded By, (t) = Taken By, (c) = Cosigned By    Initials Name Provider Type    LN Laura Pate, PT Physical Therapist               OP Exercises     Row Name 11/23/22 1300 11/23/22 1100          Precautions    Existing Precautions/Restrictions -- no known precautions/restrictions  -LN        Subjective Comments    Subjective Comments -- \"It still bothers me on my left side.\" She reports about 1 day relief after PT \"but no matter what I do, the left side still bothers me.\" Pt c/o pain in left UT/left side of neck and left OA area. \"I do think that the traction helps me.\"  -LN        Subjective Pain    Able to rate subjective pain? -- yes  -LN     Pre-Treatment Pain Level -- 5  5-6/10  -LN        Total Minutes    35172 - PT Therapeutic Exercise Minutes -- 5  -LN     25716 - PT Manual Therapy Minutes 10  -LN --        Exercise 1    Exercise Name 1 -- Verbal review of HEP  -LN        Exercise 2    Exercise Name 2 -- levator scapula stretch  -LN     Cueing 2 -- Verbal;Tactile;Demo  -LN     Reps 2 -- 3 each side  -LN     Time 2 -- 10 sec  -LN        Exercise 3    Exercise Name 3 -- self suboccipital stretch- 2 fingers  -LN     Cueing 3 -- Verbal;Tactile;Demo  -LN     Reps 3 -- 5  -LN     Time 3 -- 10 sec  -LN     Additional Comments -- or as tolerated  -LN           User Key  (r) = Recorded By, (t) = Taken By, (c) = Cosigned By    Initials Name Provider Type    " Laura Cannon, PT Physical Therapist                         Manual Rx (last 36 hours)     Manual Treatments     Row Name 11/23/22 1300             Total Minutes    86295 - PT Manual Therapy Minutes 10  -LN         Manual Rx 1    Manual Rx 1 Location (B) cervical PS/OA area/UT/medial scap border - pt seated with pillow support  -LN      Manual Rx 1 Type STM; trigger point; MFR  -LN      Manual Rx 1 Duration 10  -LN            User Key  (r) = Recorded By, (t) = Taken By, (c) = Cosigned By    Initials Name Provider Type    Laura Cannon, PT Physical Therapist                    Therapy Education  Education Details: Pt to add  levator scapula stretch and self suboccipital stretch- 2 fingers to HEP as tolerated. Pt to use MH PRN.  Given: HEP, Symptoms/condition management, Pain management, Posture/body mechanics  Program: New, Reinforced (added levator scapula stretch and self suboccipital stretch- 2 fingers)  How Provided: Verbal  Provided to: Patient  Level of Understanding: Teach back education performed, Verbalized, Demonstrated              Time Calculation:   Start Time: 1200  Stop Time: 1310  Time Calculation (min): 70 min  Timed Charges  84307 - PT Ultrasound Minutes: 8  02910 - PT Therapeutic Exercise Minutes: 5  64407 - PT Manual Therapy Minutes: 10  Untimed Charges  PT Traction Rx Minutes: 15  PT Moist Heat Minutes: 12  Total Minutes  Timed Charges Total Minutes: 10  Untimed Charges Total Minutes: 27   Total Minutes: 10  Therapy Charges for Today     Code Description Service Date Service Provider Modifiers Qty    01962686737 HC PT MANUAL THERAPY EA 15 MIN 11/23/2022 Laura Pate, PT GP 1    03875805493 HC PT ULTRASOUND EA 15 MIN 11/23/2022 Laura Pate, PT GP 1    74172606871 HC PT TRACTION CERVICAL 11/23/2022 Laura Pate, PT GP 1                    Laura Pate, PT  11/23/2022

## 2022-12-01 ENCOUNTER — APPOINTMENT (OUTPATIENT)
Dept: BONE DENSITY | Facility: HOSPITAL | Age: 62
End: 2022-12-01

## 2022-12-01 ENCOUNTER — HOSPITAL ENCOUNTER (OUTPATIENT)
Dept: MAMMOGRAPHY | Facility: HOSPITAL | Age: 62
Discharge: HOME | End: 2022-12-01

## 2022-12-01 DIAGNOSIS — Z13.820 SCREENING FOR OSTEOPOROSIS: ICD-10-CM

## 2022-12-01 DIAGNOSIS — Z12.31 ENCOUNTER FOR SCREENING MAMMOGRAM FOR MALIGNANT NEOPLASM OF BREAST: ICD-10-CM

## 2022-12-01 PROCEDURE — 77067 SCR MAMMO BI INCL CAD: CPT

## 2022-12-01 PROCEDURE — 77080 DXA BONE DENSITY AXIAL: CPT

## 2022-12-01 PROCEDURE — 77063 BREAST TOMOSYNTHESIS BI: CPT

## 2022-12-05 NOTE — PROGRESS NOTES
PIOP= PIP= DEXA shows osteopenia, however, she does not meet the criteria for treatment with osteoporosis medication. Rec daily calcium and vit D intake along with weight bearing exercises. Repeat DEXA in 2-3 years.

## 2022-12-20 ENCOUNTER — HOSPITAL ENCOUNTER (OUTPATIENT)
Dept: PHYSICAL THERAPY | Facility: HOSPITAL | Age: 62
Setting detail: THERAPIES SERIES
Discharge: HOME | End: 2022-12-20

## 2022-12-20 DIAGNOSIS — G89.29 CHRONIC NECK PAIN: Primary | ICD-10-CM

## 2022-12-20 DIAGNOSIS — M54.2 CHRONIC NECK PAIN: Primary | ICD-10-CM

## 2022-12-20 PROCEDURE — 97035 APP MDLTY 1+ULTRASOUND EA 15: CPT

## 2022-12-20 PROCEDURE — 97140 MANUAL THERAPY 1/> REGIONS: CPT

## 2022-12-20 PROCEDURE — 97012 MECHANICAL TRACTION THERAPY: CPT

## 2022-12-20 NOTE — THERAPY TREATMENT NOTE
Outpatient Physical Therapy Ortho Treatment Note  KORY Llamas     Patient Name: Cecily Broussard  : 1960  MRN: [id number]  Today's Date: 2022      Visit Date: 2022    Visit Dx:    ICD-10-CM ICD-9-CM   1. Chronic neck pain  M54.2 723.1    G89.29 338.29       Patient Active Problem List   Diagnosis   • Depression   • Hyperlipidemia   • Adult hypothyroidism   • Insomnia   • Low grade squamous intraepith lesion on cytologic smear cervix (lgsil)   • Menopausal disorder   • Arthralgia of multiple joints   • Osteoarthritis   • Positive anti-CCP test   • Vitamin D deficiency   • Healthcare maintenance   • History of colon polyps   • HPV (human papilloma virus) infection   • S/P conization of cervix   • Drug therapy   • Personal history of colonic polyps   • H/O abnormal cervical Papanicolaou smear   • Dysplasia of cervix, high grade REAGAN 2   • Large breasts   • Chronic neck pain        Past Medical History:   Diagnosis Date   • Abnormal Pap smear of cervix    • Arthritis    • Cervical dysplasia 2016    REAGAN 1on bx, CKC with REAGAN 2, neg margins   • Depression    • Disease of thyroid gland    • Hyperlipidemia    • Menopause         Past Surgical History:   Procedure Laterality Date   • ABDOMINOPLASTY     • CERVICAL CONIZATION N/A 2020    Procedure: CERVICAL CONIZATION;  Surgeon: Queta Brandt MD;  Location: [facility];  Service: Obstetrics/Gynecology;  Laterality: N/A;   •  SECTION      x 1   • COLONOSCOPY     • COLONOSCOPY N/A 2022    Procedure: COLONOSCOPY;  Surgeon: Patrice Noel MD;  Location: [facility];  Service: Gastroenterology;  Laterality: N/A;   • LAPAROSCOPIC ASSISTED VAGINAL HYSTERECTOMY SALPINGO OOPHORECTOMY     • OOPHORECTOMY      RSO   • TOTAL ABDOMINAL HYSTERECTOMY WITH SALPINGO OOPHORECTOMY     • TOTAL LAPAROSCOPIC HYSTERECTOMY Right 10/19/2021    Procedure: TOTAL LAPAROSCOPIC HYSTERECTOMY, right salpingo-oophorectomy;  Surgeon:  "Queta Brandt MD;  Location: [facility];  Service: Obstetrics/Gynecology;  Laterality: Right;   • TUBAL ABDOMINAL LIGATION          PT Ortho                                               Row Name 12/20/22 1000       Subjective Comments    Subjective Comments \"My neck still feels tight.\" Pain L>R side of neck; UT; OA area and between shoulder blades. \"I see the doctor on 1/3/23 and I am seeing the new doctor, so I am hoping to be able to get in sooner for my breast reduction since I am going to see the new doctor.\" She reports doing well with her HEP.  \"The traction does still give me relief.\"  \"This is going to be my last therapy session until I see the plastic surgeon.\"  -LN       Precautions and Contraindications    Precautions/Limitations no known precautions/limitations  -LN       Subjective Pain    Able to rate subjective pain? yes  -LN    Pre-Treatment Pain Level 5  -LN       Head/Neck/Trunk    Neck Extension AROM WFL- \"pulling\"  -LN    Neck Flexion AROM WFL- painfree  -LN    Neck Lt Lateral Flexion AROM 25%  -LN    Neck Rt Lateral Flexion AROM 50%  -LN    Neck Lt Rotation AROM 75%  -LN    Neck Rt Rotation AROM WFL  -LN          User Key  (r) = Recorded By, (t) = Taken By, (c) = Cosigned By    Initials Name Provider Type    Laura Cannon, PT Physical Therapist                             PT Assessment/Plan     Row Name 12/20/22 1000          PT Assessment    Assessment Comments Pt continues with persistent pain and tightness, left UT/cervical PS and left OA area, but overall muscle guarding decreased to minimal. Decreased c/o HA. Pt is independent with HEP. CROM improved but still limited manish lateral flexion, L is more limited than R.  She has met 4 out of 5 STG and partially met 1 STG and she has met 2 LTG, partially met 2 LTG, & 2 LTG not met. Pt to work on HEP until she sees plastic surgeon & if surgery isn't going to be scheduled for awhile, feel she would benefit from some " "continued PT due to getting some benefit, manish with traction. Otherwise, if surgey is going to be soon, will discharge PT and continue as needed or ordered after her breast reduction surgery.  -LN        PT Plan    Predicted Duration of Therapy Intervention (PT) see plan below  -LN     PT Plan Comments Pt to continue with HEP and see how she does for the next couple weeks and then she sees plastic surgeon and if her surgery isn't going to be scheduled for awhile, pt to get a new order for PT.  Pt to call with any questions or concerns. Pt to use MH PRN.  -LN           User Key  (r) = Recorded By, (t) = Taken By, (c) = Cosigned By    Initials Name Provider Type    Laura Cannon, PT Physical Therapist                 Modalities     Row Name 12/20/22 1000             Precautions    Existing Precautions/Restrictions no known precautions/restrictions  -LN         Moist Heat    MH Applied Yes  -LN      Location MHP (cervical x2) - C-spine and upper/mid back - pt supine with bolster under knees  -LN      PT Moist Heat Minutes 12  -LN      MH S/P Rx --  after manual, prior to traction  -LN         Ultrasound 64763    Location (B) UT/midscap area; (B) cervical PS; (B) OA area; L>R  -LN      Duty Cycle 100  -LN      Frequency 1.0 MHz  -LN      Intensity - Wts/cm 1.5  -LN      46247 - PT Ultrasound Minutes 8  -LN         Traction 04453    Traction Type Cervical  -LN      PT Traction Rx Minutes 15  -LN      Position Hook-lying  bolster under knees  -LN      Weight 20 -LN      Hold 60  -LN      Relax 20  -LN            User Key  (r) = Recorded By, (t) = Taken By, (c) = Cosigned By    Initials Name Provider Type    Laura Cannon, PT Physical Therapist               OP Exercises     Row Name 12/20/22 1000 12/20/22 0900          Precautions    Existing Precautions/Restrictions no known precautions/restrictions  -LN --        Subjective Comments    Subjective Comments \"My neck still feels tight.\" Pain L>R " "side of neck; UT; OA area and between shoulder blades. \"I see the doctor on 1/3/23 and I am seeing the new doctor, so I am hoping to be able to get in sooner for my breast reduction since I am going to see the new doctor.\" She reports doing well with her HEP.  \"The traction does still give me relief.\"  \"This is going to be my last therapy session until I see the plastic surgeon.\"  -LN --        Subjective Pain    Able to rate subjective pain? yes  -LN --     Pre-Treatment Pain Level 5  -LN --        Total Minutes    18780 - PT Manual Therapy Minutes -- 10  -LN        Exercise 1    Exercise Name 1 Verbal review of HEP  -LN --           User Key  (r) = Recorded By, (t) = Taken By, (c) = Cosigned By    Initials Name Provider Type    Laura Cannon, PT Physical Therapist                         Manual Rx (last 36 hours)     Manual Treatments     Row Name 12/20/22 0900             Total Minutes    21550 - PT Manual Therapy Minutes 10  -LN         Manual Rx 1    Manual Rx 1 Location (B) cervical PS/OA area/UT/medial scap border - pt seated with pillow support  -LN      Manual Rx 1 Type STM; trigger point; MFR  -LN      Manual Rx 1 Duration 10  -LN            User Key  (r) = Recorded By, (t) = Taken By, (c) = Cosigned By    Initials Name Provider Type    Laura Cannon, PT Physical Therapist                 PT OP Goals     Row Name 12/20/22 1100          PT Short Term Goals    STG 1 Patient to verbally report decreased neck pain to <6/10 with ADLs & everyday activities.  -LN     STG 1 Progress Met  -LN     STG 2 Pt independent with initial HEP issued by therapist.  -LN     STG 2 Progress Met  -LN     STG 3 CROM improved by 25% to allow her to move her neck better with driving.  -LN     STG 3 Progress Partially Met;Ongoing;Progressing  -LN     STG 3 Progress Comments met except no change in B cervical lateral flexion; still limited manish to L.  -LN     STG 4 Pt to have decreased muscle guarding B " cervical PS/scalenes/levator/UT/MT to minimal.  -LN     STG 4 Progress Met  -LN     STG 5 Pt to have decreased c/o tension HA by 25%-50%.  -LN     STG 5 Progress Met  -LN        Long Term Goals    LTG 1 Patient to verbally report decreased neck pain to <4/10 with ADLs & everyday activities.  -LN     LTG 1 Progress Not Met  -LN     LTG 1 Progress Comments Pt rates pain at 5/10 today.  -LN     LTG 2 Pt independent with more advanced HEP issued by therapist.  -LN     LTG 2 Progress Met  -LN     LTG 3 CROM WFL and painfree all planes.  -LN     LTG 3 Progress Partially Met;Ongoing;Progressing  -LN     LTG 3 Progress Comments met for cervical flexion, extension and left rotation.  -LN     LTG 4 Pt to have decreased muscle guarding B cervical PS/scalenes/levator/UT/MT to nominal.  -LN     LTG 4 Progress Not Met  -LN     LTG 4 Progress Comments Minimal still persistis.  -LN     LTG 5 Pt to have decreased c/o tension HA by 50%-75%.  -LN     LTG 5 Progress Met  -LN     LTG 6 Pt able to read for 30 minutes with no c/o any increased neck pain or tension HA.  -LN     LTG 6 Progress Partially Met  -LN     LTG 6 Progress Comments still with neck pain but no tension HA reported.  -LN           User Key  (r) = Recorded By, (t) = Taken By, (c) = Cosigned By    Initials Name Provider Type    Laura Cannon, PT Physical Therapist                Therapy Education  Education Details: Pt to continue with HEP and use MH PRN. Pt to call with any questions or concerns.  Given: HEP, Symptoms/condition management, Pain management, Posture/body mechanics  Program: Reinforced  How Provided: Verbal  Provided to: Patient  Level of Understanding: Verbalized              Time Calculation:   Start Time: 1038  Stop Time: 1150  Time Calculation (min): 72 min  Timed Charges  95247 - PT Ultrasound Minutes: 8  09010 - PT Manual Therapy Minutes: 10  Untimed Charges  PT Traction Rx Minutes: 15  PT Moist Heat Minutes: 12  Total Minutes  Timed  Charges Total Minutes: 8  Untimed Charges Total Minutes: 27   Total Minutes: 35  Therapy Charges for Today     Code Description Service Date Service Provider Modifiers Qty    00148238983 HC PT MANUAL THERAPY EA 15 MIN 12/20/2022 Laura Pate, PT GP 1    96225413364 HC PT TRACTION CERVICAL 12/20/2022 Laura Pate, PT GP 1    72032534401 HC PT ULTRASOUND EA 15 MIN 12/20/2022 Laura Pate, PT GP 1                    Laura Pate, PT  12/20/2022

## 2022-12-28 NOTE — PROGRESS NOTES
Chief Complaint  Follow-up (Consult with Dr. De La Paz to get on surgery list for a little sooner-BBR and Brachioplasty)    Subjective          History of Present Illness  Cecily Broussard is a 62 y.o. female who presents to [facility] as a consult from PCP and would like to discuss breast reduction.  Her current bra size is a 38, DDD cup.  She would like to be a D cup.  She patient does have a family history of breast cancer (her mother).  Neck, shoulders, and upper back pain present for 3-4 years.  Conservative treatments of chiropractor, physical therapy, headaches treated with tramadol with little or temporary relief.  Experiences rashes and moisture, treats with nystatin cream that was presribed. Trouble sleeping, cannot get comfortable.  Trouble exercising, cannot do activities that she would like to do, generally has to wear many sports bras and has to special order bras.  Recommendations for today of breast reduction.  No history of skin infections. Mammogram is due in November. Last mammogram 11/29/21. Came back benign.  Mother had (hormonal) breast cancer.     Has excess skin underneath bilateral arms. Self conscious and can't fit in her clothes right.         Allergies: Estrogens and Statins  Allergies Reconciled.    Review of Systems  All system were reviewed and were negative, except the ones noted above.     Review of Systems     Objective     /91 (BP Location: Left arm, Patient Position: Sitting, Cuff Size: Adult)   Pulse 90   Temp 98.2 °F (36.8 °C) (Temporal)   Ht 162.6 cm (64.02\")   Wt 82.6 kg (182 lb 3.2 oz)   SpO2 96%   BMI 31.26 kg/m²     Body mass index is 31.26 kg/m².    Physical Exam   Patient awake, alert, oriented  Respirations are non elaborated  Patient is not tachycardic    Breast Measurements:    Right Left   A: Sternal notch to nipple distance (cm) 23 23   B: Midsternum to nipple distance (cm)  33  34   C: Midclavicle to nipple  distance (cm)       D: Nipple to inframammary fold (cm) 13 13   E: Base width (cm) 16 16   F: Inframammary distance (cm)        Right Left   Areolar diameter (cm) 6 6   Nipple diameter (cm)       Superior tissue pinch test (cm)     Breast ptosis (grade 0-3) 3 3     Patient with moderate axillary fullness.  Moderate loss of upper pole fullness.  No discrete breast masses.  Patient with hyperpigmentation along the inframammary fold.  She has moderate shoulder grooving.    Arms: Patient with ptotic upper arm skin.  No discrete masses.  No hand or wrist swelling.  Estimated tissue removal 450(R), 550(L)  Body surface area is 1.88 meters squared.      Result Review :           PROCEDURE:      Assessment and Plan          Diagnoses and all orders for this visit:    1. Macromastia (Primary)           Plan: Bilateral breast reduction  Removal of more than 550 g from the left side  Removal more than 450 g from the right side    Bilateral brachioplasty    • The patient was given literature in regards to the procedure and encouraged to visit the websites of [employer] and [facility].  • Pictures obtained.  • We will have the patient send the report or undergo a pre-operative mammogram.  • We will have the patient obtain pre-operative clearance.  • We discussed the procedure for bilateral breast reduction under general anesthesia as well as the postoperative recover time and the need for limitation of activities.  The risks of surgery were discussed including bleeding, infection, scarring (for which diagrams were drawn), pain, poor healing, loss of skin and or nipple, change in nipple sensation, inability to breast feed, asymmetry, no guarantee of post-operative cup size.    • I think that this patient is an excellent candidate for a breast reduction.  If feel that this procedure is medically necessary to relieve her symptoms.  I would anticipate resecting at least 550 gm from the left, 450 gm from the right breast   • We will contact the  insurance company for pre-authorization.    • This patient has my office number to call with any further questions.   • OR: 3 hrs under general anesthesia  •   • Consent: bilateral breast reduction  •   • CPT  52517-78- breast reduction        I spent 25 minutes caring for Cecily on this date of service. This time includes time spent by me in the following activities:performing a medically appropriate examination and/or evaluation , counseling and educating the patient/family/caregiver, documenting information in the medical record, and care coordination        Scribed by Blanco De La Paz MD, acting as a scribe for Blanco De La Paz MD, 01/03/23 15:33 EST.  Blanco De La Paz MD's signature on the note affirms that the note adequately documents the care provided.          Pt will RTC for Pre op once surgery is scheduled.    Scribed by Ying Gonzales, acting as a scribe for Blanco De La Paz MD, 01/03/23 15:14 EST.  Blanco De La Paz MD's signature on the note affirms that the note adequately documents the care provided.      Follow Up     Patient was given instructions and counseling regarding her condition. Please see specific information pulled into the AVS if appropriate.     Blanco De La Paz MD  01/03/2023

## 2022-12-30 ENCOUNTER — TELEPHONE (OUTPATIENT)
Dept: INTERNAL MEDICINE | Facility: CLINIC | Age: 62
End: 2022-12-30

## 2022-12-30 NOTE — TELEPHONE ENCOUNTER
UNABLE TO WARM TRANSFER    Caller: Cecily Broussard    Relationship to patient: Self    Patient is needing: PATIENT IS NEEDING TO RESCHEDULE HER LAB   APPOINTMENT THAT IS ON 02/08/2023.  PLEASE CALL. [phone]

## 2022-12-31 NOTE — TELEPHONE ENCOUNTER
Rx Refill Note  Requested Prescriptions     Pending Prescriptions Disp Refills    levothyroxine (Synthroid) 75 MCG tablet 30 tablet 1     Sig: Take 1 tablet by mouth Every Morning.      Last office visit with prescribing clinician: 7/29/2022   Last telemedicine visit with prescribing clinician: 12/30/2022   Next office visit with prescribing clinician: 12/30/2022                         Would you like a call back once the refill request has been completed: [] Yes [] No    If the office needs to give you a call back, can they leave a voicemail: [] Yes [] No    Shalonda Louis MA  12/31/22, 14:02 EST

## 2023-01-02 RX ORDER — LEVOTHYROXINE SODIUM 0.07 MG/1
75 TABLET ORAL
Qty: 90 TABLET | Refills: 1 | Status: SHIPPED | OUTPATIENT
Start: 2023-01-02

## 2023-01-03 ENCOUNTER — OFFICE VISIT (OUTPATIENT)
Dept: PLASTIC SURGERY | Facility: CLINIC | Age: 63
End: 2023-01-03
Payer: COMMERCIAL

## 2023-01-03 ENCOUNTER — PREP FOR SURGERY (OUTPATIENT)
Dept: OTHER | Facility: HOSPITAL | Age: 63
End: 2023-01-03
Payer: COMMERCIAL

## 2023-01-03 VITALS
HEART RATE: 90 BPM | WEIGHT: 182.2 LBS | DIASTOLIC BLOOD PRESSURE: 91 MMHG | BODY MASS INDEX: 31.1 KG/M2 | SYSTOLIC BLOOD PRESSURE: 131 MMHG | TEMPERATURE: 98.2 F | OXYGEN SATURATION: 96 % | HEIGHT: 64 IN

## 2023-01-03 DIAGNOSIS — N62 MACROMASTIA: Primary | ICD-10-CM

## 2023-01-03 PROCEDURE — 1160F RVW MEDS BY RX/DR IN RCRD: CPT | Performed by: SURGERY

## 2023-01-03 PROCEDURE — 99214 OFFICE O/P EST MOD 30 MIN: CPT | Performed by: SURGERY

## 2023-01-03 PROCEDURE — 1159F MED LIST DOCD IN RCRD: CPT | Performed by: SURGERY

## 2023-01-03 RX ORDER — CEFAZOLIN SODIUM 2 G/100ML
2 INJECTION, SOLUTION INTRAVENOUS ONCE
Status: CANCELLED | OUTPATIENT
Start: 2023-01-03 | End: 2023-01-03

## 2023-01-09 ENCOUNTER — TELEPHONE (OUTPATIENT)
Dept: PLASTIC SURGERY | Facility: CLINIC | Age: 63
End: 2023-01-09
Payer: COMMERCIAL

## 2023-01-20 DIAGNOSIS — N95.9 MENOPAUSAL DISORDER: ICD-10-CM

## 2023-01-20 DIAGNOSIS — M25.50 ARTHRALGIA OF MULTIPLE JOINTS: ICD-10-CM

## 2023-01-20 RX ORDER — VENLAFAXINE HYDROCHLORIDE 75 MG/1
CAPSULE, EXTENDED RELEASE ORAL
Qty: 90 CAPSULE | Refills: 1 | Status: SHIPPED | OUTPATIENT
Start: 2023-01-20

## 2023-01-20 RX ORDER — CELECOXIB 200 MG/1
CAPSULE ORAL
Qty: 90 CAPSULE | Refills: 3 | Status: SHIPPED | OUTPATIENT
Start: 2023-01-20

## 2023-01-20 NOTE — TELEPHONE ENCOUNTER
Rx Refill Note  Requested Prescriptions     Pending Prescriptions Disp Refills    celecoxib (CeleBREX) 200 MG capsule [Pharmacy Med Name: CELECOXIB 200 MG CAPSULE] 90 capsule 1     Sig: TAKE ONE CAPSULE BY MOUTH DAILY     Signed Prescriptions Disp Refills    venlafaxine XR (EFFEXOR-XR) 75 MG 24 hr capsule 90 capsule 1     Sig: TAKE ONE CAPSULE BY MOUTH DAILY     Authorizing Provider: KIESHA HOLLEY     Ordering User: MAYKEL GARCIA      Last office visit with prescribing clinician: 7/29/2022   Last telemedicine visit with prescribing clinician: 2/8/2023   Next office visit with prescribing clinician: 7/31/2023                         Would you like a call back once the refill request has been completed: [] Yes [] No    If the office needs to give you a call back, can they leave a voicemail: [] Yes [] No    Maykel Garcia MA  01/20/23, 13:41 EST

## 2023-01-23 ENCOUNTER — OFFICE VISIT (OUTPATIENT)
Dept: OBSTETRICS AND GYNECOLOGY | Facility: CLINIC | Age: 63
End: 2023-01-23
Payer: COMMERCIAL

## 2023-01-23 VITALS
SYSTOLIC BLOOD PRESSURE: 138 MMHG | BODY MASS INDEX: 31.07 KG/M2 | HEIGHT: 64 IN | DIASTOLIC BLOOD PRESSURE: 86 MMHG | WEIGHT: 182 LBS

## 2023-01-23 DIAGNOSIS — Z13.89 SCREENING FOR GENITOURINARY CONDITION: ICD-10-CM

## 2023-01-23 DIAGNOSIS — Z87.42 H/O ABNORMAL CERVICAL PAPANICOLAOU SMEAR: ICD-10-CM

## 2023-01-23 DIAGNOSIS — M85.80 OSTEOPENIA, SENILE: ICD-10-CM

## 2023-01-23 DIAGNOSIS — N87.9 CERVICAL DYSPLASIA: Primary | ICD-10-CM

## 2023-01-23 PROCEDURE — 81002 URINALYSIS NONAUTO W/O SCOPE: CPT | Performed by: OBSTETRICS & GYNECOLOGY

## 2023-01-23 PROCEDURE — 99213 OFFICE O/P EST LOW 20 MIN: CPT | Performed by: OBSTETRICS & GYNECOLOGY

## 2023-01-23 NOTE — PROGRESS NOTES
"      Cecily Broussard is a 62 y.o. patient who presents for follow up of   Chief Complaint   Patient presents with   • Follow-up     6 month repeat pap         63 yo est pt here for her second 6 month repeat pap smear. She had a TLH/RSO in 10/2021 for recurrent abnormal pap smears and had REAGAN 2 on path specimen. Her first 6 month repeat pap in 6/2022 was normal pap and neg HPV. She feels good and has no complaints.     The following portions of the patient's history were reviewed and updated as appropriate: allergies, current medications and problem list.    Review of Systems   Genitourinary: Negative for pelvic pain, vaginal bleeding, vaginal discharge and vaginal pain.       /86   Ht 162.6 cm (64.02\")   Wt 82.6 kg (182 lb)   LMP  (LMP Unknown)   BMI 31.22 kg/m²     Physical Exam  Vitals and nursing note reviewed. Exam conducted with a chaperone present.   Constitutional:       Appearance: Normal appearance. She is well-developed.   HENT:      Head: Normocephalic and atraumatic.   Neck:      Thyroid: No thyromegaly.   Abdominal:      General: There is no distension.      Palpations: Abdomen is soft. There is no mass.      Tenderness: There is no abdominal tenderness. There is no guarding or rebound.      Hernia: No hernia is present.   Genitourinary:     General: Normal vulva.      Vagina: Normal.      Uterus: Absent.       Adnexa: Right adnexa normal and left adnexa normal.      Comments: Uterus and cervix absent  Normal cuff  Skin:     General: Skin is warm and dry.   Neurological:      Mental Status: She is alert and oriented to person, place, and time.   Psychiatric:         Mood and Affect: Mood normal.         Behavior: Behavior normal.         Thought Content: Thought content normal.         Judgment: Judgment normal.         A/P:  1. 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 Second 6 month repeat pap/HPV today.   2. RHM- RTO 7/2023 annual and/or prn  3. UTD MMG 12/2022- B1.  4. " UTD DEXA 12/2022- osteopenia with LROF 7.6 & 0.5 %.       Assessment & Plan   Diagnoses and all orders for this visit:    1. Cervical dysplasia (Primary)  -     IGP, Apt HPV,rfx 16 / 18,45    2. Screening for genitourinary condition  -     POC Urinalysis Dipstick    3. H/O abnormal cervical Papanicolaou smear  -     IGP, Apt HPV,rfx 16 / 18,45    4. Osteopenia, senile                 No follow-ups on file.      Queta Brandt MD    1/23/2023  11:06 EST

## 2023-01-30 LAB
CYTOLOGIST CVX/VAG CYTO: ABNORMAL
CYTOLOGY CVX/VAG DOC CYTO: ABNORMAL
CYTOLOGY CVX/VAG DOC THIN PREP: ABNORMAL
DX ICD CODE: ABNORMAL
DX ICD CODE: ABNORMAL
HIV 1 & 2 AB SER-IMP: ABNORMAL
HPV I/H RISK 4 DNA CVX QL PROBE+SIG AMP: POSITIVE
HPV16 DNA CVX QL PROBE+SIG AMP: NEGATIVE
HPV18+45 E6+E7 MRNA CVX QL NAA+PROBE: NEGATIVE
OTHER STN SPEC: ABNORMAL
PATHOLOGIST CVX/VAG CYTO: ABNORMAL
STAT OF ADQ CVX/VAG CYTO-IMP: ABNORMAL

## 2023-02-01 RX ORDER — METRONIDAZOLE 500 MG/1
500 TABLET ORAL 2 TIMES DAILY
Qty: 14 TABLET | Refills: 0 | Status: SHIPPED | OUTPATIENT
Start: 2023-02-01 | End: 2023-02-08

## 2023-02-01 NOTE — PROGRESS NOTES
PIP= pap of cuff shows atypical cells and + HPV. Since she just had a TLH, we will repeat this pap in 6 months to look for any high grade changes. Please place in colpo recall. She also has BV and an RX for Flagyl was called in.

## 2023-02-03 NOTE — TELEPHONE ENCOUNTER
Left voicemail, please call back if you would like to schedule surgery with Dr. De La Paz. (need to give updated quote with tax amount)

## 2023-02-23 ENCOUNTER — TELEPHONE (OUTPATIENT)
Dept: PLASTIC SURGERY | Facility: CLINIC | Age: 63
End: 2023-02-23
Payer: COMMERCIAL

## 2023-02-23 NOTE — TELEPHONE ENCOUNTER
02/23/23- surgery date is now 10/04/23 pre-op appointment has been rescheduled as well. Patient aware.

## 2023-02-23 NOTE — TELEPHONE ENCOUNTER
02/22/23-patient left voicemail asking to reschedule surgery to the first week of October.    02/23/23-returned patients call, left voicemail. We can reschedule to the first week of October. Please call back to discuss the date and move pre-op appointment.

## 2023-04-13 DIAGNOSIS — M54.2 CHRONIC NECK PAIN: ICD-10-CM

## 2023-04-13 DIAGNOSIS — G89.29 CHRONIC NECK PAIN: ICD-10-CM

## 2023-04-14 RX ORDER — TRAMADOL HYDROCHLORIDE 100 MG/1
TABLET, EXTENDED RELEASE ORAL
Qty: 30 TABLET | OUTPATIENT
Start: 2023-04-14

## 2023-04-14 NOTE — TELEPHONE ENCOUNTER
Rx Refill Note  Requested Prescriptions     Pending Prescriptions Disp Refills    traMADol ER (ULTRAM-ER) 100 MG 24 hr tablet [Pharmacy Med Name: TRAMADOL HCL  MG TABLET] 30 tablet      Sig: TAKE ONE TABLET BY MOUTH DAILY      Last office visit with prescribing clinician: 7/29/2022   Last telemedicine visit with prescribing clinician: 7/31/2023   Next office visit with prescribing clinician: 7/31/2023                         Would you like a call back once the refill request has been completed: [] Yes [] No    If the office needs to give you a call back, can they leave a voicemail: [] Yes [] No    Betty Nelson, PCT  04/14/23, 13:30 EDT

## 2023-04-21 ENCOUNTER — PREP FOR SURGERY (OUTPATIENT)
Dept: OTHER | Facility: HOSPITAL | Age: 63
End: 2023-04-21
Payer: COMMERCIAL

## 2023-04-21 DIAGNOSIS — N62 MACROMASTIA: Primary | ICD-10-CM

## 2023-04-21 RX ORDER — CEFAZOLIN SODIUM 2 G/100ML
2 INJECTION, SOLUTION INTRAVENOUS ONCE
OUTPATIENT
Start: 2023-04-21 | End: 2023-04-21

## 2023-05-10 ENCOUNTER — OFFICE VISIT (OUTPATIENT)
Dept: INTERNAL MEDICINE | Facility: CLINIC | Age: 63
End: 2023-05-10
Payer: COMMERCIAL

## 2023-05-10 VITALS
SYSTOLIC BLOOD PRESSURE: 142 MMHG | DIASTOLIC BLOOD PRESSURE: 104 MMHG | OXYGEN SATURATION: 95 % | TEMPERATURE: 98 F | WEIGHT: 178.8 LBS | HEIGHT: 64 IN | BODY MASS INDEX: 30.52 KG/M2 | HEART RATE: 95 BPM

## 2023-05-10 DIAGNOSIS — R03.0 BLOOD PRESSURE ELEVATED WITHOUT HISTORY OF HTN: ICD-10-CM

## 2023-05-10 DIAGNOSIS — M25.50 ARTHRALGIA OF MULTIPLE JOINTS: Primary | ICD-10-CM

## 2023-05-10 DIAGNOSIS — M54.2 CHRONIC NECK PAIN: ICD-10-CM

## 2023-05-10 DIAGNOSIS — Z79.899 DRUG THERAPY: ICD-10-CM

## 2023-05-10 DIAGNOSIS — E03.9 HYPOTHYROIDISM, UNSPECIFIED TYPE: ICD-10-CM

## 2023-05-10 DIAGNOSIS — G89.29 CHRONIC NECK PAIN: ICD-10-CM

## 2023-05-10 DIAGNOSIS — N95.9 MENOPAUSAL DISORDER: ICD-10-CM

## 2023-05-10 RX ORDER — TRAMADOL HYDROCHLORIDE 100 MG/1
100 TABLET, EXTENDED RELEASE ORAL DAILY
Qty: 30 TABLET | Refills: 2 | Status: SHIPPED | OUTPATIENT
Start: 2023-05-10

## 2023-05-10 RX ORDER — VENLAFAXINE HYDROCHLORIDE 75 MG/1
75 CAPSULE, EXTENDED RELEASE ORAL DAILY
Qty: 90 CAPSULE | Refills: 1 | Status: SHIPPED | OUTPATIENT
Start: 2023-05-10

## 2023-05-10 RX ORDER — LEVOTHYROXINE SODIUM 0.07 MG/1
75 TABLET ORAL
Qty: 90 TABLET | Refills: 1 | Status: SHIPPED | OUTPATIENT
Start: 2023-05-10

## 2023-05-10 NOTE — PROGRESS NOTES
Chief Complaint   Patient presents with   • Med Refill       Subjective     Cecily Broussard is a 63 y.o. female being seen for a follow up appointment today regarding OA multiple joints.    She is taking Celebrex 200 mg daily for OA. She has chronic joint pains, mostly in neck and shoulders. Described as stiffness and aching pain. She uses Ultram as needed for severe breakthru pain, usually QOD. Daily OA pain ranges for 4-6 of 10. Her bra is cutting into her shoulders and her large breasts are contributing to pains, 3 female family members have needed breast reductions. She has been to chiro and tried losing weight. She has a breast reduction scheduled 10-5-2023.       History of Present Illness     Allergies   Allergen Reactions   • Estrogens Other (See Comments)     Mother had breast cancer       • Statins Other (See Comments) and Myalgia       Joint pains         Current Outpatient Medications:   •  celecoxib (CeleBREX) 200 MG capsule, TAKE ONE CAPSULE BY MOUTH DAILY, Disp: 90 capsule, Rfl: 3  •  Cholecalciferol (VITAMIN D) 2000 UNITS capsule, Take 1 capsule by mouth Daily., Disp: 30 capsule, Rfl: 11  •  ezetimibe (ZETIA) 10 MG tablet, TAKE ONE TABLET BY MOUTH DAILY, Disp: 90 tablet, Rfl: 2  •  levothyroxine (Synthroid) 75 MCG tablet, Take 1 tablet by mouth Every Morning., Disp: 90 tablet, Rfl: 1  •  nystatin (MYCOSTATIN) 846722 UNIT/GM ointment, Apply 1 application topically to the appropriate area as directed 2 (Two) Times a Day. Under breasts, Disp: 30 g, Rfl: 0  •  traMADol ER (ULTRAM-ER) 100 MG 24 hr tablet, Take 1 tablet by mouth Daily., Disp: 30 tablet, Rfl: 2  •  venlafaxine XR (EFFEXOR-XR) 75 MG 24 hr capsule, TAKE ONE CAPSULE BY MOUTH DAILY, Disp: 90 capsule, Rfl: 1    The following portions of the patient's history were reviewed and updated as appropriate: allergies, current medications, past family history, past medical history, past social history, past surgical history and problem list.    Review of  Systems   Constitutional: Negative.    HENT: Negative.    Eyes: Negative.    Respiratory: Negative.  Negative for shortness of breath and stridor.    Cardiovascular: Negative.    Gastrointestinal: Negative.    Endocrine: Negative.    Genitourinary: Negative.    Musculoskeletal: Positive for arthralgias. Negative for back pain.   Skin: Negative.    Allergic/Immunologic: Negative.    Neurological: Negative.    Hematological: Negative.    Psychiatric/Behavioral: Negative.    All other systems reviewed and are negative.      Assessment     Physical Exam  Vitals reviewed.   Constitutional:       Appearance: Normal appearance.   Neck:      Thyroid: No thyroid mass.   Cardiovascular:      Rate and Rhythm: Normal rate and regular rhythm.      Pulses: Normal pulses.      Heart sounds: Normal heart sounds. No murmur heard.  Pulmonary:      Effort: Pulmonary effort is normal. No respiratory distress.      Breath sounds: Normal breath sounds. No stridor.   Musculoskeletal:      Cervical back: No torticollis. Pain with movement present. Normal range of motion.      Right lower leg: No edema.      Left lower leg: No edema.   Skin:     General: Skin is warm and dry.   Neurological:      General: No focal deficit present.      Mental Status: She is alert and oriented to person, place, and time.   Psychiatric:         Mood and Affect: Mood normal.         Behavior: Behavior normal.         Thought Content: Thought content normal.         Plan     Her fasting labs were reviewed with the patient from last week.     Diagnoses and all orders for this visit:    1. Arthralgia of multiple joints (Primary)  -     Urine Drug Screen - Urine, Clean Catch; Future  -     Urine Drug Screen - Urine, Clean Catch  -     traMADol ER (ULTRAM-ER) 100 MG 24 hr tablet; Take 1 tablet by mouth Daily.  Dispense: 30 tablet; Refill: 2    2. Chronic neck pain  -     Urine Drug Screen - Urine, Clean Catch; Future  -     Urine Drug Screen - Urine, Clean Catch  -      traMADol ER (ULTRAM-ER) 100 MG 24 hr tablet; Take 1 tablet by mouth Daily.  Dispense: 30 tablet; Refill: 2    3. Drug therapy  -     Urine Drug Screen - Urine, Clean Catch; Future  -     Urine Drug Screen - Urine, Clean Catch    4. Menopausal disorder  -     venlafaxine XR (EFFEXOR-XR) 75 MG 24 hr capsule; Take 1 capsule by mouth Daily.  Dispense: 90 capsule; Refill: 1    5. Hypothyroidism, unspecified type  Comments:  Decline slabs today due to lab appt and CPE in July  Orders:  -     levothyroxine (Synthroid) 75 MCG tablet; Take 1 tablet by mouth Every Morning.  Dispense: 90 tablet; Refill: 1    6. Blood pressure elevated without history of HTN  Comments:  Check BP and bring into CPE apt      The patient has read and signed the [facility] Controlled Substance Contract.  I will continue to see patient for regular follow up appointments.  They are well controlled on their medication.  MANDY is updated every 3 months. The patient is aware of the potential for addiction and dependence.    Follow up at CPE

## 2023-05-11 LAB
AMPHETAMINES UR QL SCN: NEGATIVE NG/ML
BARBITURATES UR QL SCN: NEGATIVE NG/ML
BENZODIAZ UR QL SCN: NEGATIVE NG/ML
BZE UR QL SCN: NEGATIVE NG/ML
CANNABINOIDS UR QL SCN: NEGATIVE NG/ML
CREAT UR-MCNC: 171.4 MG/DL (ref 20–300)
LABORATORY COMMENT REPORT: NORMAL
METHADONE UR QL SCN: NEGATIVE NG/ML
OPIATES UR QL SCN: NEGATIVE NG/ML
OXYCODONE+OXYMORPHONE UR QL SCN: NEGATIVE NG/ML
PCP UR QL: NEGATIVE NG/ML
PH UR: 6 [PH] (ref 4.5–8.9)
PROPOXYPH UR QL SCN: NEGATIVE NG/ML

## 2023-05-26 ENCOUNTER — TELEPHONE (OUTPATIENT)
Dept: PLASTIC SURGERY | Facility: CLINIC | Age: 63
End: 2023-05-26
Payer: COMMERCIAL

## 2023-05-26 NOTE — TELEPHONE ENCOUNTER
LVM FOR PATIENT TO CALL BACK AND RESCHEDULE SURGERY.  PLEASE TRANSFER TO OFFICE WHEN SHE CALLS BACK.

## 2023-05-31 ENCOUNTER — TELEPHONE (OUTPATIENT)
Dept: PLASTIC SURGERY | Facility: CLINIC | Age: 63
End: 2023-05-31

## 2023-05-31 NOTE — TELEPHONE ENCOUNTER
Pt called the office today and requested a refund due to the fact Dr De La Paz is leaving the practice.

## 2023-07-25 DIAGNOSIS — E78.2 MIXED HYPERLIPIDEMIA: ICD-10-CM

## 2023-07-25 RX ORDER — EZETIMIBE 10 MG/1
TABLET ORAL
Qty: 90 TABLET | Refills: 2 | Status: SHIPPED | OUTPATIENT
Start: 2023-07-25

## 2023-10-05 ENCOUNTER — TELEPHONE (OUTPATIENT)
Dept: OBSTETRICS AND GYNECOLOGY | Facility: CLINIC | Age: 63
End: 2023-10-05
Payer: COMMERCIAL

## 2023-10-05 NOTE — TELEPHONE ENCOUNTER
She had an appointment on 11/9/23 and needs to be rescheduled with you being out of the office.   She does not want to wait til March 2024 because of her past abnormal paps.    Please advise on scheduling

## 2023-10-20 ENCOUNTER — TRANSCRIBE ORDERS (OUTPATIENT)
Dept: ADMINISTRATIVE | Facility: HOSPITAL | Age: 63
End: 2023-10-20
Payer: COMMERCIAL

## 2023-10-20 DIAGNOSIS — Z12.31 VISIT FOR SCREENING MAMMOGRAM: Primary | ICD-10-CM

## 2023-11-01 RX ORDER — VENLAFAXINE HYDROCHLORIDE 37.5 MG/1
37.5 CAPSULE, EXTENDED RELEASE ORAL DAILY
Qty: 30 CAPSULE | Refills: 0 | Status: SHIPPED | OUTPATIENT
Start: 2023-11-01

## 2023-11-28 RX ORDER — VENLAFAXINE HYDROCHLORIDE 37.5 MG/1
37.5 CAPSULE, EXTENDED RELEASE ORAL DAILY
Qty: 30 CAPSULE | Refills: 0 | Status: SHIPPED | OUTPATIENT
Start: 2023-11-28

## 2023-12-04 ENCOUNTER — HOSPITAL ENCOUNTER (OUTPATIENT)
Dept: MAMMOGRAPHY | Facility: HOSPITAL | Age: 63
Discharge: HOME | End: 2023-12-04
Admitting: NURSE PRACTITIONER
Payer: COMMERCIAL

## 2023-12-04 DIAGNOSIS — Z12.31 VISIT FOR SCREENING MAMMOGRAM: ICD-10-CM

## 2023-12-04 PROCEDURE — 77067 SCR MAMMO BI INCL CAD: CPT

## 2023-12-04 PROCEDURE — 77063 BREAST TOMOSYNTHESIS BI: CPT

## 2024-01-11 DIAGNOSIS — M54.2 CHRONIC NECK PAIN: ICD-10-CM

## 2024-01-11 DIAGNOSIS — M25.50 ARTHRALGIA OF MULTIPLE JOINTS: ICD-10-CM

## 2024-01-11 DIAGNOSIS — G89.29 CHRONIC NECK PAIN: ICD-10-CM

## 2024-01-11 NOTE — TELEPHONE ENCOUNTER
Rx Refill Note  Requested Prescriptions     Pending Prescriptions Disp Refills    traMADol ER (ULTRAM-ER) 100 MG 24 hr tablet [Pharmacy Med Name: traMADol HCL ER 100MG TABLET] 30 tablet      Sig: TAKE ONE TABLET BY MOUTH DAILY      Last office visit with prescribing clinician: 5/10/2023   Last telemedicine visit with prescribing clinician: Visit date not found   Next office visit with prescribing clinician: 1/30/2024                         Would you like a call back once the refill request has been completed: [] Yes [] No    If the office needs to give you a call back, can they leave a voicemail: [] Yes [] No    Shalonda Louis MA  01/11/24, 10:03 EST

## 2024-01-12 RX ORDER — TRAMADOL HYDROCHLORIDE 100 MG/1
100 TABLET, EXTENDED RELEASE ORAL DAILY
Qty: 30 TABLET | Refills: 0 | Status: SHIPPED | OUTPATIENT
Start: 2024-01-12

## 2024-01-17 ENCOUNTER — TELEPHONE (OUTPATIENT)
Dept: INTERNAL MEDICINE | Facility: CLINIC | Age: 64
End: 2024-01-17
Payer: COMMERCIAL

## 2024-01-17 DIAGNOSIS — E03.9 HYPOTHYROIDISM, UNSPECIFIED TYPE: ICD-10-CM

## 2024-01-17 RX ORDER — LEVOTHYROXINE SODIUM 0.07 MG/1
75 TABLET ORAL EVERY MORNING
Qty: 90 TABLET | Refills: 0 | Status: SHIPPED | OUTPATIENT
Start: 2024-01-17

## 2024-01-17 NOTE — TELEPHONE ENCOUNTER
Normal TSH in past 12 months     Rx Refill Note  Requested Prescriptions     Pending Prescriptions Disp Refills    levothyroxine (SYNTHROID, LEVOTHROID) 75 MCG tablet [Pharmacy Med Name: LEVOTHYROXINE 75 MCG TABLET] 90 tablet 1     Sig: TAKE ONE TABLET BY MOUTH EVERY MORNING      Last office visit with prescribing clinician: 5/10/2023   Last telemedicine visit with prescribing clinician: Visit date not found   Next office visit with prescribing clinician: 1/30/2024                         Would you like a call back once the refill request has been completed: [] Yes [] No    If the office needs to give you a call back, can they leave a voicemail: [] Yes [] No    Betty Nelson, PCT  01/17/24, 10:14 EST

## 2024-01-18 DIAGNOSIS — E78.2 MIXED HYPERLIPIDEMIA: Primary | ICD-10-CM

## 2024-01-18 DIAGNOSIS — E55.9 VITAMIN D DEFICIENCY: ICD-10-CM

## 2024-01-18 DIAGNOSIS — E06.3 HYPOTHYROIDISM DUE TO HASHIMOTO'S THYROIDITIS: ICD-10-CM

## 2024-01-18 DIAGNOSIS — E03.8 HYPOTHYROIDISM DUE TO HASHIMOTO'S THYROIDITIS: ICD-10-CM

## 2024-01-30 ENCOUNTER — OFFICE VISIT (OUTPATIENT)
Dept: INTERNAL MEDICINE | Facility: CLINIC | Age: 64
End: 2024-01-30
Payer: COMMERCIAL

## 2024-01-30 VITALS
HEART RATE: 91 BPM | BODY MASS INDEX: 29.02 KG/M2 | OXYGEN SATURATION: 96 % | HEIGHT: 64 IN | DIASTOLIC BLOOD PRESSURE: 90 MMHG | TEMPERATURE: 98 F | WEIGHT: 170 LBS | SYSTOLIC BLOOD PRESSURE: 120 MMHG

## 2024-01-30 DIAGNOSIS — M25.50 ARTHRALGIA OF MULTIPLE JOINTS: ICD-10-CM

## 2024-01-30 DIAGNOSIS — E78.2 MIXED HYPERLIPIDEMIA: ICD-10-CM

## 2024-01-30 DIAGNOSIS — E03.9 HYPOTHYROIDISM, UNSPECIFIED TYPE: ICD-10-CM

## 2024-01-30 DIAGNOSIS — Z00.00 ANNUAL PHYSICAL EXAM: Primary | ICD-10-CM

## 2024-01-30 PROCEDURE — 99214 OFFICE O/P EST MOD 30 MIN: CPT | Performed by: NURSE PRACTITIONER

## 2024-01-30 PROCEDURE — 99396 PREV VISIT EST AGE 40-64: CPT | Performed by: NURSE PRACTITIONER

## 2024-01-30 RX ORDER — LEVOTHYROXINE SODIUM 0.1 MG/1
100 TABLET ORAL
Qty: 90 TABLET | Refills: 0 | Status: SHIPPED | OUTPATIENT
Start: 2024-01-30

## 2024-01-30 RX ORDER — CELECOXIB 200 MG/1
200 CAPSULE ORAL DAILY
Qty: 90 CAPSULE | Refills: 3 | Status: SHIPPED | OUTPATIENT
Start: 2024-01-30

## 2024-01-30 NOTE — PROGRESS NOTES
Annual Exam        Cecily Broussard is being seen for a Complete physical exam. Her last physical was 2-.     Social: She is . She is a retired .    Lifestyle: She does not use tobacco, 2 pack year history. She drinks 2 alcoholic drinks per week. She exercises 10,000 steps times a day    Screening: Colonoscopy was completed 9- Admission (Discharged) with Patrice Noel MD (09/30/2022) . Last labs reviewed from 1-.      Reproductive Health: Her Last Pap smear was 1-, ASCUS w HPV, followed by Dr. Brandt. Postmenopausal. She had a Partial Hysterectomy 10-. DEXA scan complete 10-, due at age 64 years. Last Mammogram was 12-4-2023.      Dental exam is up to date. Eye exam was completed this year.     She will need f/u on Hypothyroidism, Hyperlipoidemia, and OA pain.     She will also need a f/u on arthralgias of multiple sites. She is taking Celebrex 200 mg daily for OA. She has chronic joint pains, mostly in neck and shoulders. Described as stiffness and aching pain. She uses Ultram as needed for severe breakthru pain, usually QOD. Daily OA pain ranges for 4-6 of 10.  She has been to chiro and tried losing weight. She had a breast reduction scheduled 10-5-2023, but this was not completed.     She is on levothyroxine daily, but has been more tired and she has had hair loss.        History of Present Illness     The following portions of the patient's history were reviewed and updated as appropriate: allergies, current medications, past family history, past medical history, past social history, past surgical history, and problem list.    Review of Systems   Constitutional: Negative.    HENT: Negative.     Eyes: Negative.    Respiratory: Negative.     Cardiovascular: Negative.  Negative for chest pain, palpitations and leg swelling.   Gastrointestinal: Negative.    Endocrine: Negative.    Genitourinary: Negative.    Musculoskeletal: Negative.     Allergic/Immunologic: Negative.  Negative for environmental allergies, food allergies and immunocompromised state.   Neurological: Negative.    Hematological: Negative.    Psychiatric/Behavioral: Negative.     All other systems reviewed and are negative.      Objective       General Appearance:    Alert, cooperative, no distress, appears stated age   Head:    Normocephalic, without obvious abnormality, atraumatic   Eyes:    PERRL, conjunctiva/corneas clear, EOM's intact, fundi     benign, both eyes   Ears:    Normal TM's and external ear canals, both ears   Nose:   Nares normal, septum midline, mucosa normal, no drainage     or sinus tenderness   Throat:   Lips, mucosa, and tongue normal; teeth and gums normal   Neck:   Supple, symmetrical, trachea midline, no adenopathy;     thyroid:  no enlargement/tenderness/nodules; no carotid    bruit or JVD   Back:     Symmetric, no curvature, ROM normal, no CVA tenderness   Lungs:     Clear to auscultation bilaterally, respirations unlabored   Chest Wall:    No tenderness or deformity    Heart:    Regular rate and rhythm, S1 and S2 normal, no murmur, rub    or gallop   Breast Exam:    deferred   Abdomen:     Soft, non-tender, bowel sounds active all four quadrants,     no masses, no organomegaly   Genitalia:    deferred   Rectal:    deferred   Extremities:   Extremities normal, atraumatic, no cyanosis or edema   Pulses:   2+ and symmetric all extremities   Skin:   Skin color, texture, turgor normal, no rashes or lesions   Lymph nodes:   Cervical, supraclavicular, and axillary nodes normal   Neurologic:   CNII-XII intact, normal strength, sensation and reflexes     throughout               Assessment & Plan   Diagnoses and all orders for this visit:    1. Annual physical exam (Primary)    2. Hypothyroidism, unspecified type  Comments:  iNcreased levothyroxine to 100mcgs daily  Orders:  -     levothyroxine (SYNTHROID, LEVOTHROID) 100 MCG tablet; Take 1 tablet by mouth Every  Morning.  Dispense: 90 tablet; Refill: 0  -     TSH; Future  -     T4, Free; Future  -     TSH; Future  -     T4, Free; Future    3. Arthralgia of multiple joints  -     celecoxib (CeleBREX) 200 MG capsule; Take 1 capsule by mouth Daily.  Dispense: 90 capsule; Refill: 3    4. Mixed hyperlipidemia  -     Comprehensive Metabolic Panel; Future  -     CBC & Differential; Future  -     Lipid Panel With / Chol / HDL Ratio; Future          Preventive counseling: SBE monthly. BMI reviewed, discussed exercise recommendations, she believes thyroid is an issue.     The patient has read and signed the [facility] Controlled Substance Contract.  I will continue to see patient for regular follow up appointments.  They are well controlled on their medication.  MANDY is updated every 3 months. The patient is aware of the potential for addiction and dependence.     She will follow up at next scheduled appointment in 6 months w labs. Non fasting thyroid labs in 6 weeks

## 2024-03-06 DIAGNOSIS — E03.9 HYPOTHYROIDISM, UNSPECIFIED TYPE: ICD-10-CM

## 2024-03-06 DIAGNOSIS — E78.2 MIXED HYPERLIPIDEMIA: ICD-10-CM

## 2024-03-11 ENCOUNTER — OFFICE VISIT (OUTPATIENT)
Dept: OBSTETRICS AND GYNECOLOGY | Facility: CLINIC | Age: 64
End: 2024-03-11
Payer: MEDICARE

## 2024-03-11 VITALS
SYSTOLIC BLOOD PRESSURE: 132 MMHG | BODY MASS INDEX: 29.02 KG/M2 | WEIGHT: 170 LBS | HEIGHT: 64 IN | DIASTOLIC BLOOD PRESSURE: 70 MMHG

## 2024-03-11 DIAGNOSIS — Z01.419 PAP SMEAR, AS PART OF ROUTINE GYNECOLOGICAL EXAMINATION: Primary | ICD-10-CM

## 2024-03-11 DIAGNOSIS — Z01.419 ROUTINE GYNECOLOGICAL EXAMINATION: ICD-10-CM

## 2024-03-11 DIAGNOSIS — Z78.0 ASYMPTOMATIC MENOPAUSE: ICD-10-CM

## 2024-03-11 DIAGNOSIS — R30.0 DYSURIA: ICD-10-CM

## 2024-03-11 DIAGNOSIS — Z12.31 ENCOUNTER FOR SCREENING MAMMOGRAM FOR MALIGNANT NEOPLASM OF BREAST: ICD-10-CM

## 2024-03-11 LAB
BILIRUB BLD-MCNC: NEGATIVE MG/DL
CLARITY, POC: CLEAR
COLOR UR: NORMAL
GLUCOSE UR STRIP-MCNC: NEGATIVE MG/DL
KETONES UR QL: NEGATIVE
LEUKOCYTE EST, POC: NEGATIVE
NITRITE UR-MCNC: NEGATIVE MG/ML
PH UR: 5 [PH] (ref 5–8)
PROT UR STRIP-MCNC: NEGATIVE MG/DL
RBC # UR STRIP: NEGATIVE /UL
SP GR UR: 1.02 (ref 1–1.03)
UROBILINOGEN UR QL: NORMAL

## 2024-03-11 PROCEDURE — 1160F RVW MEDS BY RX/DR IN RCRD: CPT | Performed by: OBSTETRICS & GYNECOLOGY

## 2024-03-11 PROCEDURE — 81002 URINALYSIS NONAUTO W/O SCOPE: CPT | Performed by: OBSTETRICS & GYNECOLOGY

## 2024-03-11 PROCEDURE — 3015F CERV CANCER SCREEN DOCD: CPT | Performed by: OBSTETRICS & GYNECOLOGY

## 2024-03-11 PROCEDURE — 1159F MED LIST DOCD IN RCRD: CPT | Performed by: OBSTETRICS & GYNECOLOGY

## 2024-03-11 PROCEDURE — 99397 PER PM REEVAL EST PAT 65+ YR: CPT | Performed by: OBSTETRICS & GYNECOLOGY

## 2024-03-11 NOTE — PROGRESS NOTES
GYN Annual Exam     CC- Here for annual exam.     Cecily Broussard is a 64 y.o. female established patient here for annual and 6 month repeat pap smear. She had a TLH/RSO in 10/2021 for recurrent abnormal pap smears and had REAGAN 2 on path specimen.  She denies any VB. She does have some mild dysuria so we will send her urine for culture.   Her paps are as follows:    10/2021- TLH/RSO 10/2021- REAGAN 2 on path  2022- ASCUS neg HPV  2023- ASCUS + HPV ( neg 16, 18, 45) .         OB History          2    Para   2    Term   2            AB        Living   2         SAB        IAB        Ectopic        Molar        Multiple        Live Births              Obstetric Comments   1   1 C/S               Menarche:13  Menopause:50  HRT:none  Current contraception: S/P TLH /RSO 10/2021 ( L ovary adhered to sidewall)   History of abnormal Pap smear: yes, REAGAN 2 on TLH path  History of abnormal mammogram: no  Family history of uterine, colon or ovarian cancer: no  Family history of breast cancer: yes - mom age 69, M aunt ? age  STD's: none  Last pap smear 2023 ASCUS + HPV  BRENT; none      Health Maintenance   Topic Date Due    ANNUAL WELLNESS VISIT  Never done    RSV Vaccine - Adults (1 - 1-dose 60+ series) Never done    BMI FOLLOWUP  2023    INFLUENZA VACCINE  2023    TDAP/TD VACCINES (3 - Td or Tdap) 2023    Pneumococcal Vaccine 65+ (1 of 1 - PCV) Never done    COVID-19 Vaccine (3 - -24 season) 2024 (Originally 2023)    DXA SCAN  2024    LIPID PANEL  2025    MAMMOGRAM  2025    PAP SMEAR  2026    COLORECTAL CANCER SCREENING  2032    HEPATITIS C SCREENING  Completed    ZOSTER VACCINE  Completed       Past Medical History:   Diagnosis Date    Abnormal Pap smear of cervix 2016    Arthritis     Cervical dysplasia 2016    REAGAN 1on bx, CKC with REAGAN 2, neg margins    Depression     Disease of thyroid gland     Hyperlipidemia     Menopausal disorder 2016     Menopause     Osteopenia        Past Surgical History:   Procedure Laterality Date    ABDOMINOPLASTY      CERVICAL CONIZATION N/A 2020    Procedure: CERVICAL CONIZATION;  Surgeon: Queta Brandt MD;  Location: [facility];  Service: Obstetrics/Gynecology;  Laterality: N/A;     SECTION      x 1    COLONOSCOPY      COLONOSCOPY N/A 2022    Procedure: COLONOSCOPY;  Surgeon: Patrice Noel MD;  Location: [facility];  Service: Gastroenterology;  Laterality: N/A;    LAPAROSCOPIC ASSISTED VAGINAL HYSTERECTOMY SALPINGO OOPHORECTOMY      OOPHORECTOMY      RSO    TOTAL ABDOMINAL HYSTERECTOMY WITH SALPINGO OOPHORECTOMY      TOTAL LAPAROSCOPIC HYSTERECTOMY Right 10/19/2021    Procedure: TOTAL LAPAROSCOPIC HYSTERECTOMY, right salpingo-oophorectomy;  Surgeon: Queta Brandt MD;  Location: [facility];  Service: Obstetrics/Gynecology;  Laterality: Right;    TUBAL ABDOMINAL LIGATION           Current Outpatient Medications:     celecoxib (CeleBREX) 200 MG capsule, Take 1 capsule by mouth Daily., Disp: 90 capsule, Rfl: 3    Cholecalciferol (VITAMIN D) 2000 UNITS capsule, Take 1 capsule by mouth Daily., Disp: 30 capsule, Rfl: 11    ezetimibe (ZETIA) 10 MG tablet, TAKE ONE TABLET BY MOUTH DAILY, Disp: 90 tablet, Rfl: 2    levothyroxine (SYNTHROID, LEVOTHROID) 100 MCG tablet, Take 1 tablet by mouth Every Morning., Disp: 90 tablet, Rfl: 0    traMADol ER (ULTRAM-ER) 100 MG 24 hr tablet, TAKE ONE TABLET BY MOUTH DAILY, Disp: 30 tablet, Rfl: 0    Allergies   Allergen Reactions    Estrogens Other (See Comments)     Mother had breast cancer        Statins Other (See Comments) and Myalgia       Joint pains       Social History     Tobacco Use    Smoking status: Former     Current packs/day: 0.00     Types: Cigarettes     Start date: 1977     Quit date: 1979     Years since quittin.4     Passive exposure: Past    Smokeless tobacco: Never   Vaping Use    Vaping status: Never  "Used   Substance Use Topics    Alcohol use: Not Currently     Comment: Not very often    Drug use: No       Family History   Problem Relation Age of Onset    Breast cancer Mother 69    Kidney cancer Sister     Breast cancer Maternal Aunt     Ovarian cancer Neg Hx     Colon cancer Neg Hx     Deep vein thrombosis Neg Hx     Pulmonary embolism Neg Hx     Malig Hyperthermia Neg Hx     Uterine cancer Neg Hx        Review of Systems   Constitutional:  Negative for activity change, appetite change, fatigue, fever and unexpected weight change.   Respiratory:  Negative for cough and shortness of breath.    Cardiovascular:  Negative for chest pain and palpitations.   Gastrointestinal:  Negative for abdominal distention, abdominal pain, constipation, diarrhea and nausea.   Endocrine: Negative for cold intolerance and heat intolerance.   Genitourinary:  Positive for dysuria. Negative for dyspareunia, menstrual problem, pelvic pain, vaginal bleeding, vaginal discharge and vaginal pain.   Skin:  Negative for color change and rash.   Neurological:  Negative for headaches.   Psychiatric/Behavioral:  Negative for dysphoric mood. The patient is not nervous/anxious.    All other systems reviewed and are negative.      /70   Ht 162.6 cm (64.02\")   Wt 77.1 kg (170 lb)   LMP  (LMP Unknown)   BMI 29.16 kg/m²     Physical Exam   Constitutional: She is oriented to person, place, and time. She appears well-developed.   HENT:   Head: Normocephalic and atraumatic.   Eyes: Conjunctivae are normal. No scleral icterus.   Neck: No thyromegaly present.   Cardiovascular: Normal rate and regular rhythm.   Pulmonary/Chest: Effort normal and breath sounds normal. Right breast exhibits no inverted nipple, no mass, no nipple discharge, no skin change and no tenderness. Left breast exhibits no inverted nipple, no mass, no nipple discharge, no skin change and no tenderness.   Abdominal: Soft. Normal appearance and bowel sounds are normal. She " exhibits no distension and no mass. There is no abdominal tenderness. There is no rebound and no guarding. No hernia.   S/P abdominoplasty   Genitourinary:    Rectum normal.      Pelvic exam was performed with patient supine.   There is no rash, tenderness or lesion on the right labia. There is no rash, tenderness or lesion on the left labia. Right adnexum displays no mass, no tenderness and no fullness. Left adnexum displays no mass, no tenderness and no fullness.    No vaginal discharge, erythema, tenderness or bleeding.   No erythema, tenderness or bleeding in the vagina.    No foreign body in the vagina.      No signs of injury in the vagina.      Genitourinary Comments: Cystocele noted  Uterus and cervix absent  Normal cuff       Neurological: She is alert and oriented to person, place, and time.   Skin: Skin is warm and dry.   Psychiatric: Her behavior is normal. Mood, judgment and thought content normal.   Nursing note and vitals reviewed.         Assessment/Plan    1) GYN HM: check pap.   SBE demonstrated and encouraged.  2) STD screening: declines.Condoms encouraged.  3) Bone health - Weight bearing exercise, dietary calcium recommendations and vitamin D reviewed.   4) Dysuria- check urine culture ( dip normal, not enough specimen for a UA as well)   5) Smoking Status: No   6) Social: no issues  7)MMG:  UTD 12/2023, BI-RADS 1.  Schedule MMG 12/2024  8) DEXA-UTD 12/2022- osteopenia with LROF 7.6 & 0.5 %. Repeat DEXA 12/2024  9)C scope- UTD 9/2022- repeat in 10 years  10)Parts of this document have been copied or forwarded from her previous visits and have been reviewed, updated and edited as indicated.   11) Follow up 2 years annual and/or prn.        Diagnoses and all orders for this visit:    1. Pap smear, as part of routine gynecological examination (Primary)  -     Pap IG (Image Guided)    2. Routine gynecological examination  -     POC Urinalysis Dipstick  -     Pap IG (Image Guided)    3. Dysuria  -      Urine Culture - Urine, Urine, Random Void    4. Asymptomatic menopause  -     DEXA Bone Density Axial; Future    5. Encounter for screening mammogram for malignant neoplasm of breast  -     Mammo Screening Digital Tomosynthesis Bilateral With CAD; Future        Queta Tracey Brandt MD  3/11/2024  10:57 EDT

## 2024-03-12 LAB
ALBUMIN SERPL-MCNC: 4.4 G/DL (ref 3.5–5.2)
ALBUMIN/GLOB SERPL: 1.8 G/DL
ALP SERPL-CCNC: 150 U/L (ref 39–117)
ALT SERPL-CCNC: 29 U/L (ref 1–33)
AST SERPL-CCNC: 31 U/L (ref 1–32)
BASOPHILS # BLD AUTO: 0.03 10*3/MM3 (ref 0–0.2)
BASOPHILS NFR BLD AUTO: 0.8 % (ref 0–1.5)
BILIRUB SERPL-MCNC: 0.5 MG/DL (ref 0–1.2)
BUN SERPL-MCNC: 11 MG/DL (ref 8–23)
BUN/CREAT SERPL: 14.5 (ref 7–25)
CALCIUM SERPL-MCNC: 9.6 MG/DL (ref 8.6–10.5)
CHLORIDE SERPL-SCNC: 104 MMOL/L (ref 98–107)
CHOLEST SERPL-MCNC: 197 MG/DL (ref 0–200)
CHOLEST/HDLC SERPL: 3.58 {RATIO}
CO2 SERPL-SCNC: 29.1 MMOL/L (ref 22–29)
CREAT SERPL-MCNC: 0.76 MG/DL (ref 0.57–1)
EGFRCR SERPLBLD CKD-EPI 2021: 87.1 ML/MIN/1.73
EOSINOPHIL # BLD AUTO: 0.06 10*3/MM3 (ref 0–0.4)
EOSINOPHIL NFR BLD AUTO: 1.7 % (ref 0.3–6.2)
ERYTHROCYTE [DISTWIDTH] IN BLOOD BY AUTOMATED COUNT: 13.1 % (ref 12.3–15.4)
GLOBULIN SER CALC-MCNC: 2.4 GM/DL
GLUCOSE SERPL-MCNC: 80 MG/DL (ref 65–99)
HCT VFR BLD AUTO: 46.3 % (ref 34–46.6)
HDLC SERPL-MCNC: 55 MG/DL (ref 40–60)
HGB BLD-MCNC: 14.9 G/DL (ref 12–15.9)
IMM GRANULOCYTES # BLD AUTO: 0.01 10*3/MM3 (ref 0–0.05)
IMM GRANULOCYTES NFR BLD AUTO: 0.3 % (ref 0–0.5)
LDLC SERPL CALC-MCNC: 124 MG/DL (ref 0–100)
LYMPHOCYTES # BLD AUTO: 1.39 10*3/MM3 (ref 0.7–3.1)
LYMPHOCYTES NFR BLD AUTO: 38.9 % (ref 19.6–45.3)
MCH RBC QN AUTO: 27.1 PG (ref 26.6–33)
MCHC RBC AUTO-ENTMCNC: 32.2 G/DL (ref 31.5–35.7)
MCV RBC AUTO: 84.2 FL (ref 79–97)
MONOCYTES # BLD AUTO: 0.35 10*3/MM3 (ref 0.1–0.9)
MONOCYTES NFR BLD AUTO: 9.8 % (ref 5–12)
NEUTROPHILS # BLD AUTO: 1.73 10*3/MM3 (ref 1.7–7)
NEUTROPHILS NFR BLD AUTO: 48.5 % (ref 42.7–76)
NRBC BLD AUTO-RTO: 0 /100 WBC (ref 0–0.2)
PLATELET # BLD AUTO: 272 10*3/MM3 (ref 140–450)
POTASSIUM SERPL-SCNC: 4 MMOL/L (ref 3.5–5.2)
PROT SERPL-MCNC: 6.8 G/DL (ref 6–8.5)
RBC # BLD AUTO: 5.5 10*6/MM3 (ref 3.77–5.28)
SODIUM SERPL-SCNC: 145 MMOL/L (ref 136–145)
T4 FREE SERPL-MCNC: 1.31 NG/DL (ref 0.93–1.7)
TRIGL SERPL-MCNC: 98 MG/DL (ref 0–150)
TSH SERPL DL<=0.005 MIU/L-ACNC: 0.44 UIU/ML (ref 0.27–4.2)
VLDLC SERPL CALC-MCNC: 18 MG/DL (ref 5–40)
WBC # BLD AUTO: 3.57 10*3/MM3 (ref 3.4–10.8)

## 2024-03-13 LAB
BACTERIA UR CULT: NO GROWTH
BACTERIA UR CULT: NORMAL
CYTOLOGIST CVX/VAG CYTO: NORMAL
CYTOLOGY CVX/VAG DOC CYTO: NORMAL
CYTOLOGY CVX/VAG DOC THIN PREP: NORMAL
DX ICD CODE: NORMAL
Lab: NORMAL
OTHER STN SPEC: NORMAL
STAT OF ADQ CVX/VAG CYTO-IMP: NORMAL

## 2024-04-19 DIAGNOSIS — E78.2 MIXED HYPERLIPIDEMIA: ICD-10-CM

## 2024-04-19 RX ORDER — EZETIMIBE 10 MG/1
10 TABLET ORAL DAILY
Qty: 90 TABLET | Refills: 2 | Status: SHIPPED | OUTPATIENT
Start: 2024-04-19

## 2024-04-19 NOTE — TELEPHONE ENCOUNTER
Rx Refill Note  Requested Prescriptions     Pending Prescriptions Disp Refills    ezetimibe (ZETIA) 10 MG tablet [Pharmacy Med Name: EZETIMIBE 10 MG TABLET] 90 tablet 2     Sig: TAKE 1 TABLET BY MOUTH DAILY      Last office visit with prescribing clinician: 1/30/2024   Last telemedicine visit with prescribing clinician: Visit date not found   Next office visit with prescribing clinician: 5/8/2024                         Would you like a call back once the refill request has been completed: [] Yes [] No    If the office needs to give you a call back, can they leave a voicemail: [] Yes [] No    Betty Peters MA  04/19/24, 08:42 EDT

## 2024-04-29 DIAGNOSIS — M54.2 CHRONIC NECK PAIN: ICD-10-CM

## 2024-04-29 DIAGNOSIS — E03.9 HYPOTHYROIDISM, UNSPECIFIED TYPE: ICD-10-CM

## 2024-04-29 DIAGNOSIS — M25.50 ARTHRALGIA OF MULTIPLE JOINTS: ICD-10-CM

## 2024-04-29 DIAGNOSIS — G89.29 CHRONIC NECK PAIN: ICD-10-CM

## 2024-04-30 RX ORDER — LEVOTHYROXINE SODIUM 0.1 MG/1
100 TABLET ORAL
Qty: 90 TABLET | Refills: 0 | Status: SHIPPED | OUTPATIENT
Start: 2024-04-30

## 2024-04-30 RX ORDER — TRAMADOL HYDROCHLORIDE 100 MG/1
100 TABLET, EXTENDED RELEASE ORAL DAILY
Qty: 30 TABLET | Refills: 0 | Status: SHIPPED | OUTPATIENT
Start: 2024-04-30

## 2024-04-30 NOTE — TELEPHONE ENCOUNTER
Rx Refill Note  Requested Prescriptions     Pending Prescriptions Disp Refills    traMADol ER (ULTRAM-ER) 100 MG 24 hr tablet [Pharmacy Med Name: TRAMADOL HCL  MG TABLET] 30 tablet      Sig: TAKE 1 TABLET BY MOUTH DAILY    levothyroxine (SYNTHROID, LEVOTHROID) 100 MCG tablet [Pharmacy Med Name: LEVOTHYROXINE 100 MCG TABLET] 90 tablet 0     Sig: Take 1 tablet by mouth Every Morning.      Last office visit with prescribing clinician: 1/30/2024   Last telemedicine visit with prescribing clinician: Visit date not found   Next office visit with prescribing clinician: 5/8/2024                         Would you like a call back once the refill request has been completed: [] Yes [] No    If the office needs to give you a call back, can they leave a voicemail: [] Yes [] No    Lynne Baker MA  04/30/24, 07:39 EDT

## 2024-05-08 ENCOUNTER — OFFICE VISIT (OUTPATIENT)
Dept: INTERNAL MEDICINE | Facility: CLINIC | Age: 64
End: 2024-05-08
Payer: MEDICARE

## 2024-05-08 VITALS
TEMPERATURE: 97.8 F | HEIGHT: 64 IN | BODY MASS INDEX: 28.44 KG/M2 | OXYGEN SATURATION: 95 % | HEART RATE: 76 BPM | SYSTOLIC BLOOD PRESSURE: 130 MMHG | WEIGHT: 166.6 LBS | DIASTOLIC BLOOD PRESSURE: 88 MMHG

## 2024-05-08 DIAGNOSIS — E03.8 HYPOTHYROIDISM DUE TO HASHIMOTO'S THYROIDITIS: ICD-10-CM

## 2024-05-08 DIAGNOSIS — E06.3 HYPOTHYROIDISM DUE TO HASHIMOTO'S THYROIDITIS: ICD-10-CM

## 2024-05-08 DIAGNOSIS — Z00.00 WELCOME TO MEDICARE PREVENTIVE VISIT: Primary | ICD-10-CM

## 2024-05-08 DIAGNOSIS — E78.2 MIXED HYPERLIPIDEMIA: ICD-10-CM

## 2024-05-08 DIAGNOSIS — Z23 NEED FOR VACCINATION FOR PNEUMOCOCCUS: ICD-10-CM

## 2024-05-08 PROCEDURE — 90677 PCV20 VACCINE IM: CPT | Performed by: NURSE PRACTITIONER

## 2024-05-08 PROCEDURE — G0009 ADMIN PNEUMOCOCCAL VACCINE: HCPCS | Performed by: NURSE PRACTITIONER

## 2024-05-08 PROCEDURE — 1126F AMNT PAIN NOTED NONE PRSNT: CPT | Performed by: NURSE PRACTITIONER

## 2024-05-08 PROCEDURE — G0402 INITIAL PREVENTIVE EXAM: HCPCS | Performed by: NURSE PRACTITIONER

## 2024-08-01 DIAGNOSIS — G89.29 CHRONIC NECK PAIN: ICD-10-CM

## 2024-08-01 DIAGNOSIS — M25.50 ARTHRALGIA OF MULTIPLE JOINTS: ICD-10-CM

## 2024-08-01 DIAGNOSIS — M54.2 CHRONIC NECK PAIN: ICD-10-CM

## 2024-08-01 RX ORDER — TRAMADOL HYDROCHLORIDE 100 MG/1
100 TABLET, EXTENDED RELEASE ORAL DAILY
Qty: 30 TABLET | Refills: 0 | Status: SHIPPED | OUTPATIENT
Start: 2024-08-01

## 2024-08-01 NOTE — TELEPHONE ENCOUNTER
Rx Refill Note  Requested Prescriptions     Pending Prescriptions Disp Refills    traMADol ER (ULTRAM-ER) 100 MG 24 hr tablet [Pharmacy Med Name: TRAMADOL HCL  MG TABLET] 30 tablet      Sig: TAKE 1 TABLET BY MOUTH DAILY      Last office visit with prescribing clinician: 5/8/2024   Last telemedicine visit with prescribing clinician: Visit date not found   Next office visit with prescribing clinician: Visit date not found                         Would you like a call back once the refill request has been completed: [] Yes [] No    If the office needs to give you a call back, can they leave a voicemail: [] Yes [] No    Sonia Garcia MA  08/01/24, 12:49 EDT

## 2024-09-02 DIAGNOSIS — E03.9 HYPOTHYROIDISM, UNSPECIFIED TYPE: ICD-10-CM

## 2024-09-03 RX ORDER — LEVOTHYROXINE SODIUM 100 UG/1
100 TABLET ORAL
Qty: 90 TABLET | Refills: 0 | Status: SHIPPED | OUTPATIENT
Start: 2024-09-03

## 2024-10-30 DIAGNOSIS — E78.2 MIXED HYPERLIPIDEMIA: ICD-10-CM

## 2024-10-30 DIAGNOSIS — E06.3 HYPOTHYROIDISM DUE TO HASHIMOTO'S THYROIDITIS: ICD-10-CM

## 2024-11-13 LAB
ALBUMIN SERPL-MCNC: 4.1 G/DL (ref 3.5–5.2)
ALBUMIN/GLOB SERPL: 1.6 G/DL
ALP SERPL-CCNC: 147 U/L (ref 39–117)
ALT SERPL-CCNC: 29 U/L (ref 1–33)
AST SERPL-CCNC: 25 U/L (ref 1–32)
BILIRUB SERPL-MCNC: 0.5 MG/DL (ref 0–1.2)
BUN SERPL-MCNC: 11 MG/DL (ref 8–23)
BUN/CREAT SERPL: 17.2 (ref 7–25)
CALCIUM SERPL-MCNC: 9.4 MG/DL (ref 8.6–10.5)
CHLORIDE SERPL-SCNC: 105 MMOL/L (ref 98–107)
CHOLEST SERPL-MCNC: 173 MG/DL (ref 0–200)
CHOLEST/HDLC SERPL: 2.93 {RATIO}
CO2 SERPL-SCNC: 28.4 MMOL/L (ref 22–29)
CREAT SERPL-MCNC: 0.64 MG/DL (ref 0.57–1)
EGFRCR SERPLBLD CKD-EPI 2021: 98.2 ML/MIN/1.73
GLOBULIN SER CALC-MCNC: 2.5 GM/DL
GLUCOSE SERPL-MCNC: 83 MG/DL (ref 65–99)
HDLC SERPL-MCNC: 59 MG/DL (ref 40–60)
LDLC SERPL CALC-MCNC: 100 MG/DL (ref 0–100)
POTASSIUM SERPL-SCNC: 4.2 MMOL/L (ref 3.5–5.2)
PROT SERPL-MCNC: 6.6 G/DL (ref 6–8.5)
SODIUM SERPL-SCNC: 142 MMOL/L (ref 136–145)
T4 FREE SERPL-MCNC: 1.75 NG/DL (ref 0.92–1.68)
TRIGL SERPL-MCNC: 76 MG/DL (ref 0–150)
TSH SERPL DL<=0.005 MIU/L-ACNC: 0.03 UIU/ML (ref 0.27–4.2)
VLDLC SERPL CALC-MCNC: 14 MG/DL (ref 5–40)

## 2024-11-19 ENCOUNTER — OFFICE VISIT (OUTPATIENT)
Dept: INTERNAL MEDICINE | Facility: CLINIC | Age: 64
End: 2024-11-19
Payer: MEDICARE

## 2024-11-19 VITALS
OXYGEN SATURATION: 95 % | DIASTOLIC BLOOD PRESSURE: 82 MMHG | HEART RATE: 104 BPM | SYSTOLIC BLOOD PRESSURE: 132 MMHG | HEIGHT: 64 IN | TEMPERATURE: 98 F | WEIGHT: 157.2 LBS | BODY MASS INDEX: 26.84 KG/M2

## 2024-11-19 DIAGNOSIS — E78.2 MIXED HYPERLIPIDEMIA: Primary | ICD-10-CM

## 2024-11-19 DIAGNOSIS — G89.29 CHRONIC NECK PAIN: ICD-10-CM

## 2024-11-19 DIAGNOSIS — E03.9 ADULT HYPOTHYROIDISM: ICD-10-CM

## 2024-11-19 DIAGNOSIS — M25.50 ARTHRALGIA OF MULTIPLE JOINTS: ICD-10-CM

## 2024-11-19 DIAGNOSIS — M54.2 CHRONIC NECK PAIN: ICD-10-CM

## 2024-11-19 PROBLEM — Z23 NEED FOR VACCINATION FOR PNEUMOCOCCUS: Status: RESOLVED | Noted: 2024-05-08 | Resolved: 2024-11-19

## 2024-11-19 PROCEDURE — 1126F AMNT PAIN NOTED NONE PRSNT: CPT | Performed by: NURSE PRACTITIONER

## 2024-11-19 PROCEDURE — 99214 OFFICE O/P EST MOD 30 MIN: CPT | Performed by: NURSE PRACTITIONER

## 2024-11-19 PROCEDURE — G2211 COMPLEX E/M VISIT ADD ON: HCPCS | Performed by: NURSE PRACTITIONER

## 2024-11-19 RX ORDER — TRAMADOL HYDROCHLORIDE 100 MG/1
100 TABLET, EXTENDED RELEASE ORAL DAILY
Qty: 30 TABLET | Refills: 0 | Status: SHIPPED | OUTPATIENT
Start: 2024-11-19

## 2024-11-19 RX ORDER — LEVOTHYROXINE SODIUM 75 UG/1
75 TABLET ORAL
Qty: 90 TABLET | Refills: 1 | Status: SHIPPED | OUTPATIENT
Start: 2024-11-19

## 2024-11-19 RX ORDER — CELECOXIB 200 MG/1
200 CAPSULE ORAL DAILY
Qty: 90 CAPSULE | Refills: 3 | Status: SHIPPED | OUTPATIENT
Start: 2024-11-19

## 2024-11-19 NOTE — PROGRESS NOTES
Chief Complaint   Patient presents with    Hypothyroidism     Follow up    Hyperlipidemia    Osteoarthritis       Subjective     Cecily Broussard is a 64 y.o. female being seen for a follow up appointment today regarding  Hypothyroidism, Hyperlipoidemia, and OA pain.      She has arthralgias of multiple sites. She is taking Celebrex 200 mg daily for OA. She has chronic joint pains, mostly in neck and shoulders. Described as stiffness and aching pain. Daily OA pain ranges for 4-6 of 10. She uses Ultram 100mg ER as needed for severe breakthru pain, usually takes QOD..  She has been to chiro and tried losing weight. She had a breast reduction scheduled 10-5-2023, but this was not completed.      She has hypothyroidism. She is on levothyroxine 100mcgs daily.     She has hyperlipidemia, and she takes Zetia 10mg due to statin intolerance.      Answers submitted by the patient for this visit:  Other (Submitted on 11/13/2024)  Please describe your symptoms.: Follow up  Have you had these symptoms before?: No  How long have you been having these symptoms?: 1-4 days  Primary Reason for Visit (Submitted on 11/13/2024)  What is the primary reason for your visit?: Problem Not Listed      Hypothyroidism  Patient reports no palpitations. Her past medical history is significant for hyperlipidemia.   Hyperlipidemia  Exacerbating diseases include hypothyroidism. Pertinent negatives include no chest pain.   Osteoarthritis  Her past medical history is significant for osteoarthritis.        Allergies   Allergen Reactions    Estrogens Other (See Comments)     Mother had breast cancer        Statins Other (See Comments) and Myalgia       Joint pains         Current Outpatient Medications:     celecoxib (CeleBREX) 200 MG capsule, Take 1 capsule by mouth Daily., Disp: 90 capsule, Rfl: 3    Cholecalciferol (VITAMIN D) 2000 UNITS capsule, Take 1 capsule by mouth Daily., Disp: 30 capsule, Rfl: 11    ezetimibe (ZETIA) 10 MG tablet, TAKE 1 TABLET BY  MOUTH DAILY, Disp: 90 tablet, Rfl: 2    levothyroxine (SYNTHROID, LEVOTHROID) 100 MCG tablet, TAKE 1 TABLET BY MOUTH EVERY MORNING, Disp: 90 tablet, Rfl: 0    traMADol ER (ULTRAM-ER) 100 MG 24 hr tablet, TAKE 1 TABLET BY MOUTH DAILY, Disp: 30 tablet, Rfl: 0    The following portions of the patient's history were reviewed and updated as appropriate: allergies, current medications, past family history, past medical history, past social history, past surgical history, and problem list.    Review of Systems   Constitutional: Negative.    HENT: Negative.     Eyes: Negative.    Cardiovascular: Negative.  Negative for chest pain, palpitations and leg swelling.   Gastrointestinal: Negative.    Endocrine: Negative.    Musculoskeletal:  Positive for arthralgias.   Allergic/Immunologic: Negative.    Neurological: Negative.    Hematological: Negative.    Psychiatric/Behavioral: Negative.     All other systems reviewed and are negative.      Assessment     Physical Exam  Vitals reviewed.   Constitutional:       Appearance: Normal appearance.   HENT:      Head: Normocephalic.      Nose: No congestion or rhinorrhea.   Cardiovascular:      Rate and Rhythm: Normal rate and regular rhythm.      Pulses: Normal pulses.      Heart sounds: Normal heart sounds. No murmur heard.  Pulmonary:      Effort: Pulmonary effort is normal. No respiratory distress.      Breath sounds: Normal breath sounds. No stridor.   Musculoskeletal:      Cervical back: Neck supple.   Skin:     General: Skin is warm and dry.   Neurological:      General: No focal deficit present.      Mental Status: She is alert and oriented to person, place, and time.   Psychiatric:         Mood and Affect: Mood normal.         Behavior: Behavior normal.         Thought Content: Thought content normal.         Plan     Her fasting labs were reviewed with the patient from last week.     Diagnoses and all orders for this visit:    1. Mixed hyperlipidemia  (Primary)  Comments:  Chronic, improved with working out and Zetia 10mg daily    2. Adult hypothyroidism  Comments:  chronc, reduce to levothyroxine to 75mcgs daily  Orders:  -     levothyroxine (SYNTHROID, LEVOTHROID) 75 MCG tablet; Take 1 tablet by mouth Every Morning.  Dispense: 90 tablet; Refill: 1  -     T4, Free; Future  -     TSH; Future    3. Chronic neck pain  Comments:  On celebrex 200mg daily  Orders:  -     traMADol ER (ULTRAM-ER) 100 MG 24 hr tablet; Take 1 tablet by mouth Daily.  Dispense: 30 tablet; Refill: 0    4. Arthralgia of multiple joints  Comments:  Ultram 100ER daily prn  Orders:  -     traMADol ER (ULTRAM-ER) 100 MG 24 hr tablet; Take 1 tablet by mouth Daily.  Dispense: 30 tablet; Refill: 0  -     celecoxib (CeleBREX) 200 MG capsule; Take 1 capsule by mouth Daily.  Dispense: 90 capsule; Refill: 3    The patient has read and signed the [facility] Controlled Substance Contract.  I will continue to see patient for regular follow up appointments.  They are well controlled on their medication.  MANDY is updated every 3 months. The patient is aware of the potential for addiction and dependence.     Follow up in 6 months

## 2025-01-16 DIAGNOSIS — E78.2 MIXED HYPERLIPIDEMIA: ICD-10-CM

## 2025-01-16 RX ORDER — EZETIMIBE 10 MG/1
10 TABLET ORAL DAILY
Qty: 90 TABLET | Refills: 1 | Status: SHIPPED | OUTPATIENT
Start: 2025-01-16

## 2025-01-16 NOTE — TELEPHONE ENCOUNTER
Rx Refill Note  Requested Prescriptions     Pending Prescriptions Disp Refills    ezetimibe (ZETIA) 10 MG tablet [Pharmacy Med Name: EZETIMIBE 10 MG TABLET] 90 tablet 1     Sig: TAKE 1 TABLET BY MOUTH DAILY      Last office visit with prescribing clinician: 11/19/2024   Last telemedicine visit with prescribing clinician: Visit date not found   Next office visit with prescribing clinician: 5/27/2025                         Would you like a call back once the refill request has been completed: [] Yes [] No    If the office needs to give you a call back, can they leave a voicemail: [] Yes [] No    Betty Peters MA  01/16/25, 14:09 EST

## 2025-01-26 DIAGNOSIS — M54.2 CHRONIC NECK PAIN: ICD-10-CM

## 2025-01-26 DIAGNOSIS — M25.50 ARTHRALGIA OF MULTIPLE JOINTS: ICD-10-CM

## 2025-01-26 DIAGNOSIS — G89.29 CHRONIC NECK PAIN: ICD-10-CM

## 2025-01-26 RX ORDER — TRAMADOL HYDROCHLORIDE 100 MG/1
100 TABLET, EXTENDED RELEASE ORAL DAILY
Qty: 30 TABLET | Refills: 0 | Status: SHIPPED | OUTPATIENT
Start: 2025-01-26

## 2025-02-21 DIAGNOSIS — Z12.31 ENCOUNTER FOR SCREENING MAMMOGRAM FOR MALIGNANT NEOPLASM OF BREAST: ICD-10-CM

## 2025-02-21 DIAGNOSIS — Z78.0 POSTMENOPAUSE: Primary | ICD-10-CM

## 2025-05-20 DIAGNOSIS — E03.9 ADULT HYPOTHYROIDISM: ICD-10-CM

## 2025-05-20 RX ORDER — LEVOTHYROXINE SODIUM 75 UG/1
75 TABLET ORAL
Qty: 90 TABLET | Refills: 0 | Status: SHIPPED | OUTPATIENT
Start: 2025-05-20

## 2025-05-20 NOTE — TELEPHONE ENCOUNTER
[facility] cosign protocol failed    Rx Refill Note  Requested Prescriptions     Pending Prescriptions Disp Refills    levothyroxine (SYNTHROID, LEVOTHROID) 75 MCG tablet [Pharmacy Med Name: LEVOTHYROXINE 75 MCG TABLET] 90 tablet 1     Sig: Take 1 tablet by mouth Every Morning.      Last office visit with prescribing clinician: 11/19/2024   Last telemedicine visit with prescribing clinician: Visit date not found   Next office visit with prescribing clinician: 5/27/2025                         Would you like a call back once the refill request has been completed: [] Yes [] No    If the office needs to give you a call back, can they leave a voicemail: [] Yes [] No    Sonia Garcia MA  05/20/25, 10:38 EDT

## 2025-05-21 DIAGNOSIS — E78.2 MIXED HYPERLIPIDEMIA: Primary | ICD-10-CM

## 2025-05-22 DIAGNOSIS — E78.2 MIXED HYPERLIPIDEMIA: ICD-10-CM

## 2025-05-22 LAB
ALBUMIN SERPL-MCNC: 4.4 G/DL (ref 3.5–5.2)
ALBUMIN/GLOB SERPL: 1.7 G/DL
ALP SERPL-CCNC: 124 U/L (ref 39–117)
ALT SERPL-CCNC: 11 U/L (ref 1–33)
AST SERPL-CCNC: 17 U/L (ref 1–32)
BILIRUB SERPL-MCNC: 0.4 MG/DL (ref 0–1.2)
BUN SERPL-MCNC: 14 MG/DL (ref 8–23)
BUN/CREAT SERPL: 16.7 (ref 7–25)
CALCIUM SERPL-MCNC: 9.6 MG/DL (ref 8.6–10.5)
CHLORIDE SERPL-SCNC: 104 MMOL/L (ref 98–107)
CHOLEST SERPL-MCNC: 201 MG/DL (ref 0–200)
CHOLEST/HDLC SERPL: 3.14 {RATIO}
CO2 SERPL-SCNC: 26.7 MMOL/L (ref 22–29)
CREAT SERPL-MCNC: 0.84 MG/DL (ref 0.57–1)
EGFRCR SERPLBLD CKD-EPI 2021: 76.8 ML/MIN/1.73
GLOBULIN SER CALC-MCNC: 2.6 GM/DL
GLUCOSE SERPL-MCNC: 79 MG/DL (ref 65–99)
HDLC SERPL-MCNC: 64 MG/DL (ref 40–60)
LDLC SERPL CALC-MCNC: 118 MG/DL (ref 0–100)
POTASSIUM SERPL-SCNC: 4.3 MMOL/L (ref 3.5–5.2)
PROT SERPL-MCNC: 7 G/DL (ref 6–8.5)
SODIUM SERPL-SCNC: 142 MMOL/L (ref 136–145)
TRIGL SERPL-MCNC: 110 MG/DL (ref 0–150)
VLDLC SERPL CALC-MCNC: 19 MG/DL (ref 5–40)
WRITTEN AUTHORIZATION: NORMAL

## 2025-05-27 ENCOUNTER — OFFICE VISIT (OUTPATIENT)
Dept: INTERNAL MEDICINE | Facility: CLINIC | Age: 65
End: 2025-05-27
Payer: MEDICARE

## 2025-05-27 VITALS
OXYGEN SATURATION: 98 % | HEIGHT: 64 IN | TEMPERATURE: 97.7 F | BODY MASS INDEX: 25.1 KG/M2 | SYSTOLIC BLOOD PRESSURE: 110 MMHG | HEART RATE: 78 BPM | DIASTOLIC BLOOD PRESSURE: 76 MMHG | WEIGHT: 147 LBS

## 2025-05-27 DIAGNOSIS — Z79.899 HIGH RISK MEDICATION USE: ICD-10-CM

## 2025-05-27 DIAGNOSIS — G89.29 CHRONIC NECK PAIN: Chronic | ICD-10-CM

## 2025-05-27 DIAGNOSIS — M54.2 CHRONIC NECK PAIN: Chronic | ICD-10-CM

## 2025-05-27 DIAGNOSIS — Z00.00 ENCOUNTER FOR ANNUAL WELLNESS VISIT (AWV) IN MEDICARE PATIENT: Primary | ICD-10-CM

## 2025-05-27 DIAGNOSIS — E03.9 ACQUIRED HYPOTHYROIDISM: Chronic | ICD-10-CM

## 2025-05-27 DIAGNOSIS — M25.50 ARTHRALGIA OF MULTIPLE JOINTS: ICD-10-CM

## 2025-05-27 DIAGNOSIS — E78.2 MIXED HYPERLIPIDEMIA: Chronic | ICD-10-CM

## 2025-05-27 PROBLEM — R03.0 BLOOD PRESSURE ELEVATED WITHOUT HISTORY OF HTN: Status: RESOLVED | Noted: 2023-05-10 | Resolved: 2025-05-27

## 2025-05-27 PROBLEM — N62 LARGE BREASTS: Status: RESOLVED | Noted: 2022-07-29 | Resolved: 2025-05-27

## 2025-05-27 PROCEDURE — 1126F AMNT PAIN NOTED NONE PRSNT: CPT | Performed by: NURSE PRACTITIONER

## 2025-05-27 PROCEDURE — 1170F FXNL STATUS ASSESSED: CPT | Performed by: NURSE PRACTITIONER

## 2025-05-27 PROCEDURE — 99214 OFFICE O/P EST MOD 30 MIN: CPT | Performed by: NURSE PRACTITIONER

## 2025-05-27 PROCEDURE — G0439 PPPS, SUBSEQ VISIT: HCPCS | Performed by: NURSE PRACTITIONER

## 2025-05-27 RX ORDER — TRAMADOL HYDROCHLORIDE 100 MG/1
100 TABLET, EXTENDED RELEASE ORAL DAILY
Qty: 30 TABLET | Refills: 0 | Status: SHIPPED | OUTPATIENT
Start: 2025-05-27

## 2025-05-27 RX ORDER — EZETIMIBE 10 MG/1
10 TABLET ORAL DAILY
Qty: 90 TABLET | Refills: 1 | Status: SHIPPED | OUTPATIENT
Start: 2025-05-27

## 2025-05-27 NOTE — PROGRESS NOTES
Subjective   The ABCs of the Annual Wellness Visit  Medicare Wellness Visit      Cecily Broussard is a 65 y.o. patient who presents for a Medicare Wellness Visit.    The following portions of the patient's history were reviewed and   updated as appropriate: allergies, current medications, past family history, past medical history, past social history, past surgical history, and problem list.    Compared to one year ago, the patient's physical   health is the same.  Compared to one year ago, the patient's mental   health is the same.    Recent Hospitalizations:  She was admitted within the past 365 days at  for breast reduction [facility].     Current Medical Providers:  Patient Care Team:  Lyubov Whelan APRN as PCP - Jinny Govea DO as Consulting Physician (Obstetrics and Gynecology)  Paul Laureano MD as Consulting Physician (Rheumatology)  Queta Brandt MD as Consulting Physician (Obstetrics and Gynecology)    Outpatient Medications Prior to Visit   Medication Sig Dispense Refill    celecoxib (CeleBREX) 200 MG capsule Take 1 capsule by mouth Daily. 90 capsule 3    Cholecalciferol (VITAMIN D) 2000 UNITS capsule Take 1 capsule by mouth Daily. 30 capsule 11    ezetimibe (ZETIA) 10 MG tablet TAKE 1 TABLET BY MOUTH DAILY 90 tablet 1    levothyroxine (SYNTHROID, LEVOTHROID) 75 MCG tablet TAKE 1 TABLET BY MOUTH EVERY MORNING 90 tablet 0    traMADol ER (ULTRAM-ER) 100 MG 24 hr tablet TAKE 1 TABLET BY MOUTH DAILY 30 tablet 0     No facility-administered medications prior to visit.     Opioid medication/s are on active medication list.  and I have evaluated her active treatment plan and pain score trends (see table).  Vitals:    05/27/25 0948   PainSc: 0-No pain     I have reviewed the chart for potential of high risk medication and harmful drug interactions in the elderly.        Aspirin is not on active medication list.  Aspirin use is not indicated based on review of current medical  "condition/s. Risk of harm outweighs potential benefits.  .    Patient Active Problem List   Diagnosis    Depression    Hyperlipidemia    Hypothyroidism    Insomnia    Low grade squamous intraepith lesion on cytologic smear cervix (lgsil)    Menopausal disorder    Arthralgia of multiple joints    Osteoarthritis    Positive anti-CCP test    Vitamin D deficiency    History of colon polyps    HPV (human papilloma virus) infection    S/P conization of cervix    Encounter for annual wellness visit (AWV) in Medicare patient    Personal history of colonic polyps    H/O abnormal cervical Papanicolaou smear    Dysplasia of cervix, high grade REAGAN 2    Large breasts    Chronic neck pain    Blood pressure elevated without history of HTN     Advance Care Planning Advance Directive is not on file.  ACP discussion was held with the patient during this visit. Patient has an advance directive (not in EMR), copy requested.            Objective   Vitals:    05/27/25 0948   BP: 110/76   BP Location: Left arm   Patient Position: Sitting   Cuff Size: Adult   Pulse: 78   Temp: 97.7 °F (36.5 °C)   TempSrc: Infrared   SpO2: 98%   Weight: 66.7 kg (147 lb)   Height: 162.6 cm (64\")   PainSc: 0-No pain       Estimated body mass index is 25.23 kg/m² as calculated from the following:    Height as of this encounter: 162.6 cm (64\").    Weight as of this encounter: 66.7 kg (147 lb).    BMI is >= 25 and <30. (Overweight) The following options were offered after discussion;: exercise counseling/recommendations, nutrition counseling/recommendations, and weight improved and BMI 25.2           Does the patient have evidence of cognitive impairment? No  Lab Results   Component Value Date    CHLPL 201 (H) 05/20/2025    TRIG 110 05/20/2025    HDL 64 (H) 05/20/2025     (H) 05/20/2025    VLDL 19 05/20/2025                                                                                                Health  Risk Assessment    Smoking Status:  Social " History     Tobacco Use   Smoking Status Former    Current packs/day: 0.00    Average packs/day: 0.3 packs/day for 2.7 years (0.7 ttl pk-yrs)    Types: Cigarettes    Start date: 1977    Quit date: 1979    Years since quittin.7    Passive exposure: Past   Smokeless Tobacco Never     Alcohol Consumption:  Social History     Substance and Sexual Activity   Alcohol Use Not Currently    Comment: Not very often       Fall Risk Screen  STEADI Fall Risk Assessment was completed, and patient is at LOW risk for falls.Assessment completed on:2025    Depression Screening   Little interest or pleasure in doing things? Not at all   Feeling down, depressed, or hopeless? Not at all   PHQ-2 Total Score 0      Health Habits and Functional and Cognitive Screenin/27/2025     9:46 AM   Functional & Cognitive Status   Do you have difficulty preparing food and eating? No   Do you have difficulty bathing yourself, getting dressed or grooming yourself? No   Do you have difficulty using the toilet? No   Do you have difficulty moving around from place to place? No   Do you have trouble with steps or getting out of a bed or a chair? No   Current Diet Well Balanced Diet   Dental Exam Up to date   Eye Exam Up to date   Exercise (times per week) 4 times per week   Current Exercises Include Walking;Light Weights   Do you need help using the phone?  No   Are you deaf or do you have serious difficulty hearing?  No   Do you need help to go to places out of walking distance? No   Do you need help shopping? No   Do you need help preparing meals?  No   Do you need help with housework?  No   Do you need help with laundry? No   Do you need help taking your medications? No   Do you need help managing money? No   Do you ever drive or ride in a car without wearing a seat belt? No   Have you felt unusual stress, anger or loneliness in the last month? No   Who do you live with? Alone   If you need help, do you have trouble finding  someone available to you? No   Have you been bothered in the last four weeks by sexual problems? No   Do you have difficulty concentrating, remembering or making decisions? No           Age-appropriate Screening Schedule:  Refer to the list below for future screening recommendations based on patient's age, sex and/or medical conditions. Orders for these recommended tests are listed in the plan section. The patient has been provided with a written plan.    Health Maintenance List  Health Maintenance   Topic Date Due    TDAP/TD VACCINES (3 - Td or Tdap) 08/19/2023    COVID-19 Vaccine (3 - 2024-25 season) 09/01/2024    DXA SCAN  12/01/2024    ANNUAL WELLNESS VISIT  05/08/2025    INFLUENZA VACCINE  07/01/2025    MAMMOGRAM  12/04/2025    LIPID PANEL  05/20/2026    COLORECTAL CANCER SCREENING  09/30/2032    HEPATITIS C SCREENING  Completed    Pneumococcal Vaccine 50+  Completed    ZOSTER VACCINE  Completed                                                                                                                                                CMS Preventative Services Quick Reference  Risk Factors Identified During Encounter  Chronic Pain: Chronic Pain Educational material Discussed and Shared in After Visit Summary for Patient.  NSAIDs per medication orders.    The above risks/problems have been discussed with the patient.  Pertinent information has been shared with the patient in the After Visit Summary.  An After Visit Summary and PPPS were made available to the patient.    Follow Up:   Next Medicare Wellness visit to be scheduled in 1 year.         Additional E&M Note during same encounter follows:  Patient has additional, significant, and separately identifiable condition(s)/problem(s) that require work above and beyond the Medicare Wellness Visit     Chief Complaint  Medicare Wellness-subsequent    Subjective   HPI  Tequila is also being seen today for additional medical problem/s.      She need s f/u on  "Hypothyroidism, Hyperlipidemia, and OA pain.      She has arthralgias of multiple sites. She is taking Celebrex 200 mg daily for OA. She has chronic joint pains, mostly in neck and shoulders. Described as stiffness and aching pain. Daily OA pain ranges for 4-6 of 10. She uses Ultram 100mg ER as needed for severe breakthru pain, usually takes QOD. She has been to chiro and tried losing weight.      She has hypothyroidism. She is on levothyroxine 100mcgs daily.     She has hyperlipidemia, and she takes Zetia 10mg due to statin intolerance.           Objective   Vital Signs:  /76 (BP Location: Left arm, Patient Position: Sitting, Cuff Size: Adult)   Pulse 78   Temp 97.7 °F (36.5 °C) (Infrared)   Ht 162.6 cm (64\")   Wt 66.7 kg (147 lb)   SpO2 98%   BMI 25.23 kg/m²   Physical Exam  Vitals reviewed.   Constitutional:       Appearance: Normal appearance. She is not ill-appearing.   HENT:      Head: Normocephalic.      Right Ear: Tympanic membrane normal.      Left Ear: Tympanic membrane normal.      Nose: Nose normal.   Cardiovascular:      Rate and Rhythm: Normal rate and regular rhythm.      Pulses: Normal pulses.      Heart sounds: Normal heart sounds. No murmur heard.  Pulmonary:      Effort: Pulmonary effort is normal. No respiratory distress.      Breath sounds: Normal breath sounds. No stridor.   Chest:   Breasts:     Breasts are symmetrical.      Right: No swelling.      Left: No swelling.      Comments: Bilateral elliptical scarring from breast reduction  Musculoskeletal:      Cervical back: Neck supple.      Right lower leg: No edema.      Left lower leg: No edema.   Skin:     General: Skin is warm and dry.      Findings: No lesion.   Neurological:      General: No focal deficit present.      Mental Status: She is alert and oriented to person, place, and time.   Psychiatric:         Mood and Affect: Mood normal.         Behavior: Behavior normal.         Thought Content: Thought content normal. "         The following data was reviewed by: ESTHER Edmonds on 05/27/2025:  Data reviewed : Radiologic studies Mammogram, Cardiology studies telemetry 2022, Consultant notes plastics, and GI studies colonoscopy  Common labs          11/13/2024    10:37 5/20/2025    10:10   Common Labs   Glucose 83  79    BUN 11  14    Creatinine 0.64  0.84    Sodium 142  142    Potassium 4.2  4.3    Chloride 105  104    Calcium 9.4  9.6    Albumin 4.1  4.4    Total Bilirubin 0.5  0.4    Alkaline Phosphatase 147  124    AST (SGOT) 25  17    ALT (SGPT) 29  11    Total Cholesterol 173  201    Triglycerides 76  110    HDL Cholesterol 59  64    LDL Cholesterol  100  118      CMP          11/13/2024    10:37 5/20/2025    10:10   CMP   Glucose 83  79    BUN 11  14    Creatinine 0.64  0.84    EGFR 98.2  76.8    Sodium 142  142    Potassium 4.2  4.3    Chloride 105  104    Calcium 9.4  9.6    Total Protein 6.6  7.0    Albumin 4.1  4.4    Globulin 2.5  2.6    Total Bilirubin 0.5  0.4    Alkaline Phosphatase 147  124    AST (SGOT) 25  17    ALT (SGPT) 29  11    Albumin/Globulin Ratio 1.6  1.7    BUN/Creatinine Ratio 17.2  16.7          Lipid Panel          11/13/2024    10:37 5/20/2025    10:10   Lipid Panel   Total Cholesterol 173  201    Triglycerides 76  110    HDL Cholesterol 59  64    VLDL Cholesterol 14  19    LDL Cholesterol  100  118      TSH          11/13/2024    10:37 5/20/2025    10:10   TSH   TSH 0.029  3.170           Assessment and Plan      Encounter for annual wellness visit (AWV) in Medicare patient         Acquired hypothyroidism         Mixed hyperlipidemia       Orders:    ezetimibe (ZETIA) 10 MG tablet; Take 1 tablet by mouth Daily.    Chronic neck pain    Orders:    Urine Drug Screen - Urine, Clean Catch    Arthralgia of multiple joints    Orders:    traMADol ER (ULTRAM-ER) 100 MG 24 hr tablet; Take 1 tablet by mouth Daily.    High risk medication use    Orders:    Urine Drug Screen - Urine, Clean Catch         Diagnosis Plan   1. Encounter for annual wellness visit (AWV) in Medicare patient        2. Acquired hypothyroidism      Euthyroid on levothyroxine 75mcgs daily      3. Mixed hyperlipidemia  ezetimibe (ZETIA) 10 MG tablet    LDL gaol < 100 on Zetia 10mg daily      4. Chronic neck pain  Urine Drug Screen - Urine, Clean Catch    Celebrex 200mg daily, Uses Ultram 100mg ER prn      5. Arthralgia of multiple joints  traMADol ER (ULTRAM-ER) 100 MG 24 hr tablet    Ultram 100ER daily prn      6. High risk medication use  Urine Drug Screen - Urine, Clean Catch            I spent 45 minutes caring for Cecily on this date of service. This time includes time spent by me in the following activities:preparing for the visit, reviewing tests, obtaining and/or reviewing a separately obtained history, performing a medically appropriate examination and/or evaluation , counseling and educating the patient/family/caregiver, ordering medications, tests, or procedures, and referring and communicating with other health care professionals   Follow Up   6 months w labs  Patient was given instructions and counseling regarding her condition or for health maintenance advice. Please see specific information pulled into the AVS if appropriate.

## 2025-05-29 LAB
AMPHETAMINES UR QL SCN: NEGATIVE NG/ML
BARBITURATES UR QL SCN: NEGATIVE NG/ML
BENZODIAZ UR QL SCN: NEGATIVE NG/ML
BZE UR QL SCN: NEGATIVE NG/ML
CANNABINOIDS UR QL SCN: NEGATIVE NG/ML
CREAT UR-MCNC: 132.2 MG/DL (ref 20–300)
LABORATORY COMMENT REPORT: NORMAL
METHADONE UR QL SCN: NEGATIVE NG/ML
OPIATES UR QL SCN: NEGATIVE NG/ML
OXYCODONE+OXYMORPHONE UR QL SCN: NEGATIVE NG/ML
PCP UR QL: NEGATIVE NG/ML
PH UR: 5.6 [PH] (ref 4.5–8.9)
PROPOXYPH UR QL SCN: NEGATIVE NG/ML

## 2025-07-28 ENCOUNTER — OFFICE VISIT (OUTPATIENT)
Dept: OBSTETRICS AND GYNECOLOGY | Facility: CLINIC | Age: 65
End: 2025-07-28
Payer: MEDICARE

## 2025-07-28 ENCOUNTER — APPOINTMENT (OUTPATIENT)
Dept: BONE DENSITY | Facility: HOSPITAL | Age: 65
End: 2025-07-28
Payer: MEDICARE

## 2025-07-28 ENCOUNTER — HOSPITAL ENCOUNTER (OUTPATIENT)
Dept: MAMMOGRAPHY | Facility: HOSPITAL | Age: 65
Discharge: HOME | End: 2025-07-28
Payer: MEDICARE

## 2025-07-28 VITALS
DIASTOLIC BLOOD PRESSURE: 84 MMHG | BODY MASS INDEX: 24.92 KG/M2 | SYSTOLIC BLOOD PRESSURE: 110 MMHG | WEIGHT: 146 LBS | HEIGHT: 64 IN

## 2025-07-28 DIAGNOSIS — N76.89 OTHER SPECIFIED INFLAMMATION OF VAGINA AND VULVA: ICD-10-CM

## 2025-07-28 DIAGNOSIS — R31.9 HEMATURIA, UNSPECIFIED TYPE: ICD-10-CM

## 2025-07-28 DIAGNOSIS — M54.9 ACUTE BILATERAL BACK PAIN, UNSPECIFIED BACK LOCATION: ICD-10-CM

## 2025-07-28 DIAGNOSIS — Z13.89 SCREENING FOR GENITOURINARY CONDITION: Primary | ICD-10-CM

## 2025-07-28 DIAGNOSIS — Z12.31 ENCOUNTER FOR SCREENING MAMMOGRAM FOR MALIGNANT NEOPLASM OF BREAST: ICD-10-CM

## 2025-07-28 DIAGNOSIS — N93.9 VAGINAL BLEEDING: ICD-10-CM

## 2025-07-28 DIAGNOSIS — Z78.0 POSTMENOPAUSE: ICD-10-CM

## 2025-07-28 DIAGNOSIS — N95.2 VAGINAL ATROPHY: ICD-10-CM

## 2025-07-28 PROCEDURE — 77080 DXA BONE DENSITY AXIAL: CPT

## 2025-07-28 PROCEDURE — 99214 OFFICE O/P EST MOD 30 MIN: CPT | Performed by: OBSTETRICS & GYNECOLOGY

## 2025-07-28 PROCEDURE — 81002 URINALYSIS NONAUTO W/O SCOPE: CPT | Performed by: OBSTETRICS & GYNECOLOGY

## 2025-07-28 PROCEDURE — 77063 BREAST TOMOSYNTHESIS BI: CPT | Performed by: RADIOLOGY

## 2025-07-28 PROCEDURE — 1160F RVW MEDS BY RX/DR IN RCRD: CPT | Performed by: OBSTETRICS & GYNECOLOGY

## 2025-07-28 PROCEDURE — 77067 SCR MAMMO BI INCL CAD: CPT

## 2025-07-28 PROCEDURE — 77063 BREAST TOMOSYNTHESIS BI: CPT

## 2025-07-28 PROCEDURE — 1159F MED LIST DOCD IN RCRD: CPT | Performed by: OBSTETRICS & GYNECOLOGY

## 2025-07-28 PROCEDURE — 77067 SCR MAMMO BI INCL CAD: CPT | Performed by: RADIOLOGY

## 2025-07-28 RX ORDER — ESTRADIOL 0.1 MG/G
1 CREAM VAGINAL 2 TIMES WEEKLY
Qty: 45 G | Refills: 6 | Status: SHIPPED | OUTPATIENT
Start: 2025-07-28

## 2025-07-28 NOTE — PROGRESS NOTES
"/*-      Cecily Broussard is a 65 y.o. patient who presents for follow up of   Chief Complaint   Patient presents with    Back Pain    Vaginal Bleeding     Brown spotting       66 yo est pt here for low back pain, brown spotting and midline cramping. She had a TLH/RSO in 10/2021 for persistently abnl pap smear. She had REAGAN 2 on TLH path. Her pap in3/2024 was normal. No dysuria or pain with sex. She had a MMG and DEXA today that are pending.       The following portions of the patient's history were reviewed and updated as appropriate: allergies, current medications and problem list.    Review of Systems   Constitutional:  Positive for activity change.   Genitourinary:  Positive for vaginal bleeding and vaginal discharge.   Musculoskeletal:  Positive for back pain.   All other systems reviewed and are negative.      /84   Ht 162.6 cm (64\")   Wt 66.2 kg (146 lb)   LMP  (LMP Unknown)   BMI 25.06 kg/m²     Physical Exam  Vitals and nursing note reviewed. Exam conducted with a chaperone present.   Constitutional:       Appearance: She is well-developed.   HENT:      Head: Normocephalic and atraumatic.   Eyes:      General: No scleral icterus.     Conjunctiva/sclera: Conjunctivae normal.   Neck:      Thyroid: No thyromegaly.   Abdominal:      General: There is no distension.      Palpations: Abdomen is soft. There is no mass.      Tenderness: There is no abdominal tenderness. There is no guarding or rebound.      Hernia: No hernia is present.   Genitourinary:     Vagina: Vaginal discharge and bleeding present.      Uterus: Absent.       Comments: Mod atrophy  Uterus and cervix absent  Normal cuff  NS discharge and pink spotting noted  No masses  Skin:     General: Skin is warm and dry.   Neurological:      Mental Status: She is alert and oriented to person, place, and time.   Psychiatric:         Mood and Affect: Mood normal.         Behavior: Behavior normal.         Thought Content: Thought content normal.         " Judgment: Judgment normal.         A/P:  1. Vaginal spotting and discharge- check NuSwab Y/M/L/B  2. Back pain and pressure- schedule TVUS and visit to look at L ovary  3. Vaginal atrophy- ERX Estrace cream x2/week.Patient counseled that vaginal estrogen rings, creams and tablets are available and highly effective at treating local vaginal symptoms such as atrophy and vaginal dryness.  Vaginal estrogen does not cause uterine overgrowth and does not require a progestogen to protect the uterus.  Very small amounts of estrogen are absorbed systemically.  For patients with a history of an estrogen dependent cancer such as breast cancer, the decision to use local estrogen for local vaginal symptoms should be made after consultation with her oncologist.  Possible side effects include local irritation or burning and/or vaginal bleeding and should always be reported.    4. Hematuria- check UA and CS  5. RTO in 6 weeks annual, TVUS and f/u VB  6. EPIC LOS calculator used to determine coding level.       Assessment & Plan   Diagnoses and all orders for this visit:    1. Screening for genitourinary condition (Primary)  -     POC Urinalysis Dipstick    2. Hematuria, unspecified type  -     Urine Culture - Urine, Urine, Random Void  -     Urinalysis With Microscopic - Urine, Random Void    3. Vaginal bleeding  -     NuSwab VG+ - Swab, Vagina  -     Genital Mycoplasmas JOSLYN, Swab - Swab, Vagina    4. Other specified inflammation of vagina and vulva  -     NuSwab VG+ - Swab, Vagina    5. Vaginal atrophy    6. Acute bilateral back pain, unspecified back location    Other orders  -     estradiol (ESTRACE) 0.1 MG/GM vaginal cream; Insert 1 g into the vagina 2 (Two) Times a Week.  Dispense: 45 g; Refill: 6                 No follow-ups on file.      Queta Brandt MD    7/28/2025  12:14 EDT

## 2025-07-31 LAB
A VAGINAE DNA VAG QL NAA+PROBE: NORMAL SCORE
BVAB2 DNA VAG QL NAA+PROBE: NORMAL SCORE
C ALBICANS DNA VAG QL NAA+PROBE: NEGATIVE
C GLABRATA DNA VAG QL NAA+PROBE: NEGATIVE
C TRACH DNA SPEC QL NAA+PROBE: NEGATIVE
M GENITALIUM DNA SPEC QL NAA+PROBE: NEGATIVE
M HOMINIS DNA SPEC QL NAA+PROBE: NEGATIVE
MEGA1 DNA VAG QL NAA+PROBE: NORMAL SCORE
N GONORRHOEA DNA VAG QL NAA+PROBE: NEGATIVE
T VAGINALIS DNA VAG QL NAA+PROBE: NEGATIVE
UREAPLASMA DNA SPEC QL NAA+PROBE: POSITIVE

## 2025-08-01 LAB
APPEARANCE UR: CLEAR
BACTERIA #/AREA URNS HPF: NORMAL /HPF
BACTERIA UR CULT: ABNORMAL
BACTERIA UR CULT: ABNORMAL
BILIRUB UR QL STRIP: NEGATIVE
CASTS URNS MICRO: NORMAL
COLOR UR: YELLOW
EPI CELLS #/AREA URNS HPF: NORMAL /HPF
GLUCOSE UR QL STRIP: NEGATIVE
HGB UR QL STRIP: ABNORMAL
KETONES UR QL STRIP: NEGATIVE
LEUKOCYTE ESTERASE UR QL STRIP: ABNORMAL
NITRITE UR QL STRIP: NEGATIVE
OTHER ANTIBIOTIC SUSC ISLT: ABNORMAL
PH UR STRIP: 6.5 [PH] (ref 5–8)
PROT UR QL STRIP: NEGATIVE
RBC #/AREA URNS HPF: NORMAL /HPF
SP GR UR STRIP: 1.01 (ref 1–1.03)
UROBILINOGEN UR STRIP-MCNC: ABNORMAL MG/DL
WBC #/AREA URNS HPF: NORMAL /HPF

## 2025-08-17 DIAGNOSIS — E03.9 ADULT HYPOTHYROIDISM: ICD-10-CM

## 2025-08-17 RX ORDER — LEVOTHYROXINE SODIUM 75 UG/1
75 TABLET ORAL
Qty: 90 TABLET | Refills: 0 | Status: SHIPPED | OUTPATIENT
Start: 2025-08-17

## (undated) DEVICE — SAFESECURE,SECUREMENT,FOLEY CATH,STERILE: Brand: MEDLINE

## (undated) DEVICE — CONMED ELECTROSURGICAL ACCESSORY, BALL ELECTRODE, 5 MM, EXTENDED SHAFT: Brand: CONMED

## (undated) DEVICE — GLV SURG SENSICARE PI MIC PF SZ7.5 LF STRL

## (undated) DEVICE — SUT VIC 0 CT1 CR8 18IN J840D

## (undated) DEVICE — DECANT BG O JET

## (undated) DEVICE — BLADE SHIELD SCALPEL HOLDER: Brand: DEVON

## (undated) DEVICE — TUBING, SUCTION, 1/4" X 12', STRAIGHT: Brand: MEDLINE

## (undated) DEVICE — SYR CONTRL LUERLOK 10CC

## (undated) DEVICE — GLV SURG SENSICARE ORTHO PF LF 7 STRL

## (undated) DEVICE — KT ORCA ORCAPOD DISP STRL

## (undated) DEVICE — NDL SPINE 22G 31/2IN BLK

## (undated) DEVICE — SUCTION CANISTER, 1000CC,SAFELINER: Brand: DEROYAL

## (undated) DEVICE — JELLY,LUBE,STERILE,FLIP TOP,TUBE,4-OZ: Brand: MEDLINE

## (undated) DEVICE — APPL CHLORAPREP HI/LITE 26ML ORNG

## (undated) DEVICE — COUNT NDL MAG FM/BLCK 40COUNT

## (undated) DEVICE — PK TLH 90

## (undated) DEVICE — DRP Z/FRICTION 10X16IN

## (undated) DEVICE — PATIENT RETURN ELECTRODE, SINGLE-USE, CONTACT QUALITY MONITORING, ADULT, WITH 9FT CORD, FOR PATIENTS WEIGING OVER 33LBS. (15KG): Brand: MEGADYNE

## (undated) DEVICE — ST TBG DSE 6FT

## (undated) DEVICE — LAG PERI GYN: Brand: MEDLINE INDUSTRIES, INC.

## (undated) DEVICE — LAPAROSCOPIC SMOKE FILTRATION SYSTEM: Brand: PALL LAPAROSHIELD® PLUS LAPAROSCOPIC SMOKE FILTRATION SYSTEM

## (undated) DEVICE — PAD GRND REM POLYHESIVE A/ DISP

## (undated) DEVICE — TOWEL,OR,DSP,ST,BLUE,STD,4/PK,20PK/CS: Brand: MEDLINE

## (undated) DEVICE — BW-412T DISP COMBO CLEANING BRUSH: Brand: SINGLE USE COMBINATION CLEANING BRUSH

## (undated) DEVICE — GLV SURG NEOLON 2G PF LF 6.5 STRL

## (undated) DEVICE — SYR LL TP 10ML STRL

## (undated) DEVICE — SAFELINER SUCTION CANISTER 1000CC: Brand: DEROYAL

## (undated) DEVICE — ENDOPATH PNEUMONEEDLE INSUFFLATION NEEDLES WITH LUER LOCK CONNECTORS 120MM: Brand: ENDOPATH

## (undated) DEVICE — INTENDED FOR TISSUE SEPARATION, AND OTHER PROCEDURES THAT REQUIRE A SHARP SURGICAL BLADE TO PUNCTURE OR CUT.: Brand: BARD-PARKER ® STAINLESS STEEL BLADES

## (undated) DEVICE — SWAB PROCTO 16 1/2IN STRL

## (undated) DEVICE — SUT VIC 0 CT1 36IN J946H

## (undated) DEVICE — ENDOPATH 5MM ENDOSCOPIC BLUNT TIP DISSECTORS (12 POUCHES CONTAINING 3 DISSECTORS EACH): Brand: ENDOPATH

## (undated) DEVICE — OCCL COLPO PNEUMO  STRL

## (undated) DEVICE — HARMONIC ACE +7 LAPAROSCOPIC SHEARS ADVANCED HEMOSTASIS 5MM DIAMETER 36CM SHAFT LENGTH  FOR USE WITH GRAY HAND PIECE ONLY: Brand: HARMONIC ACE

## (undated) DEVICE — JACKT LAB F/R KNIT CUFF/COLR XLG BLU

## (undated) DEVICE — MANIP UTER RUMI TP 6.7MMX8CM BLU

## (undated) DEVICE — SUCTION CANISTER, 3000CC,SAFELINER: Brand: DEROYAL

## (undated) DEVICE — ADAPT CLN BIOGUARD AIR/H2O DISP

## (undated) DEVICE — MEDI-VAC YANK SUCT HNDL W/TPRD BULBOUS TIP: Brand: CARDINAL HEALTH

## (undated) DEVICE — TRAP FLD MINIVAC MEGADYNE 100ML

## (undated) DEVICE — PENCL E/S ULTRAVAC TELESCP NOSE HOLSTR 10FT

## (undated) DEVICE — SUT MNCRYL 4/0 PS2 18 IN

## (undated) DEVICE — IRRIGATOR BULB ASEPTO 60CC STRL

## (undated) DEVICE — NDL HYPO PRECISIONGLIDE REG 25G 1 1/2

## (undated) DEVICE — Device

## (undated) DEVICE — CONTAINER,SPECIMEN,OR STERILE,4OZ: Brand: MEDLINE